# Patient Record
Sex: MALE | Race: WHITE | Employment: OTHER | URBAN - METROPOLITAN AREA
[De-identification: names, ages, dates, MRNs, and addresses within clinical notes are randomized per-mention and may not be internally consistent; named-entity substitution may affect disease eponyms.]

---

## 2017-01-26 ENCOUNTER — OFFICE VISIT (OUTPATIENT)
Dept: INTERNAL MEDICINE CLINIC | Age: 72
End: 2017-01-26

## 2017-01-26 VITALS
WEIGHT: 224 LBS | SYSTOLIC BLOOD PRESSURE: 104 MMHG | HEART RATE: 67 BPM | OXYGEN SATURATION: 97 % | DIASTOLIC BLOOD PRESSURE: 65 MMHG | TEMPERATURE: 97.4 F | BODY MASS INDEX: 27.85 KG/M2 | HEIGHT: 75 IN | RESPIRATION RATE: 18 BRPM

## 2017-01-26 DIAGNOSIS — J06.9 VIRAL UPPER RESPIRATORY TRACT INFECTION: Primary | ICD-10-CM

## 2017-01-26 DIAGNOSIS — R05.9 COUGH: ICD-10-CM

## 2017-01-26 RX ORDER — BENZONATATE 200 MG/1
200 CAPSULE ORAL
Qty: 25 CAP | Refills: 1 | Status: SHIPPED | OUTPATIENT
Start: 2017-01-26 | End: 2017-02-02

## 2017-01-26 NOTE — PATIENT INSTRUCTIONS
Saline Nasal Washes: Care Instructions  Your Care Instructions  Saline nasal washes help keep the nasal passages open by washing out thick or dried mucus. This simple remedy can help relieve symptoms of allergies, sinusitis, and colds. It also can make the nose feel more comfortable by keeping the mucous membranes moist. You may notice a little burning sensation in your nose the first few times you use the solution, but this usually gets better in a few days. Follow-up care is a key part of your treatment and safety. Be sure to make and go to all appointments, and call your doctor if you are having problems. It's also a good idea to know your test results and keep a list of the medicines you take. How can you care for yourself at home? · You can buy premixed saline solution in a squeeze bottle or other sinus rinse products at a drugstore. Read and follow the instructions on the label. · You also can make your own saline solution by adding 1 teaspoon of salt and 1 teaspoon of baking soda to 2 cups of distilled water. · If you use a homemade solution, pour a small amount into a clean bowl. Using a rubber bulb syringe, squeeze the syringe and place the tip in the salt water. Pull a small amount of the salt water into the syringe by relaxing your hand. · Sit down with your head tilted slightly back. Do not lie down. Put the tip of the bulb syringe or the squeeze bottle a little way into one of your nostrils. Gently drip or squirt a few drops into the nostril. Repeat with the other nostril. Some sneezing and gagging are normal at first.  · Gently blow your nose. · Wipe the syringe or bottle tip clean after each use. · Repeat this 2 or 3 times a day. · Use nasal washes gently if you have nosebleeds often. When should you call for help? Watch closely for changes in your health, and be sure to contact your doctor if:  · You often get nosebleeds. · You have problems doing the nasal washes.   Where can you learn more? Go to http://roxana-vinny.info/. Enter 071 [id number] [phone] in the search box to learn more about \"Saline Nasal Washes: Care Instructions. \"  Current as of: July 29, 2016  Content Version: 11.1  © 6285-5388 [employer]. Care instructions adapted under license by [employer] (which disclaims liability or warranty for this information). If you have questions about a medical condition or this instruction, always ask your healthcare professional. [address] any warranty or liability for your use of this information.

## 2017-01-26 NOTE — PROGRESS NOTES
Have you been to the ER or urgent care clinic since your last visit? ER  Sean Wyman   Fell x 1 month  Left wrist   better know     Have you been hospitalized since your last visit? No     Have you been seen or consulted any other health care provider outside of [address] [place] [address] since your last visit (including pap smears, colonoscopy screening)?   No

## 2017-01-26 NOTE — MR AVS SNAPSHOT
Visit Information Date & Time Provider Department Dept. Phone Encounter #  
 1/26/2017  8:30 AM Rachael Chery MD [facility] [id number] Follow-up Instructions Return if symptoms worsen or fail to improve. Your Appointments 1/30/2017 11:15 AM  
PACEMAKER with PACEMAKERCHAYO  
[facility] (FREDDIE SCHEDULING) Appt Note: stj/pm/stf/b 12-28-16  
 354 [facility] [place] [address] [place], [place] [phone]  
  
   
 [address] [address]  
  
    
 4/18/2017  3:00 PM  
VASCULAR TEST with VASCULAR, MADELINEIS  
[facility] ([facility] Appt Note: [address] CAROTIDS AT [address] Nestor Bustos; Mare Cavanaugh at 4pm vascular at [facility] [address] [place] [address]  
[phone]  
  
   
 [address] [address]  
  
    
 4/18/2017  4:00 PM  
ESTABLISHED PATIENT with Ayush Duke MD  
[facility] ([facility]) Appt Note: 6 MO [address] Dr. Rebekah Cavanaugh at 4pm vascular at [facility] [address] [facility] [address]  
[phone]  
  
   
 [address] [address]  
  
    
 5/22/2017  1:00 PM  
ROUTINE CARE with Rachael Chery MD  
[facility] Appt Note: 6 mnth fu  
 [address] [place] [address]  
[phone]  
  
   
 [address] [facility] [address] Upcoming Health Maintenance Date Due  
 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 Amoxicillin  10/28/2011    Rash Clindamycin  02/11/2009    Hives, Rash Pcn [Penicillins]  02/11/2009    Rash States he is able to tolerate cephalexin with no adverse reaction Sulfa (Sulfonamide Antibiotics)  02/21/2011    Rash Venom-honey Bee  08/18/2015    Rash [place] Current Immunizations  Reviewed on 5/5/2016 Name Date Influenza High Dose Vaccine PF 9/30/2016, 9/25/2015 Influenza Vaccine 9/22/2014 Influenza Vaccine (Whole Virus) 10/15/2012 Influenza Vaccine Split 10/24/2011 Pneumococcal Conjugate (PCV-13) 9/25/2015 Pneumococcal Vaccine (Unspecified Type) 10/15/2012, 10/24/2011 Tdap 1/11/2016 Zoster Vaccine, Live 7/28/2012 Not reviewed this visit You Were Diagnosed With   
  
 Codes Comments Viral upper respiratory tract infection    -  Primary ICD-10-CM: J06.9, B97.89 ICD-9-CM: 465.9 Cough     ICD-10-CM: R05 ICD-9-CM: 800. 2 Vitals BP Pulse Temp Resp Height(growth percentile) Weight(growth percentile) 104/65 (BP 1 Location: Left arm, BP Patient Position: Sitting) 67 97.4 °F (36.3 °C) 18 6' 3\" (1.905 m) 224 lb (101.6 kg) SpO2 BMI Smoking Status 97% 28 kg/m2 Never Smoker BMI and BSA Data Body Mass Index Body Surface Area  
 28 kg/m 2 2.32 m 2 Preferred Pharmacy [facility] [place] [facility] [phone] Your Updated Medication List  
  
   
This list is accurate as of: 1/26/17  8:52 AM.  Always use your most recent med list.  
  
  
  
  
 aspirin delayed-release 81 mg tablet Take 81 mg by mouth daily. atorvastatin 40 mg tablet Commonly known as:  LIPITOR  
TAKE 1 TABLET DAILY  
  
 benzonatate 200 mg capsule Commonly known as:  TESSALON  
 Take 1 Cap by mouth three (3) times daily as needed for Cough for up to 7 days. CLARITIN 10 mg tablet Generic drug:  loratadine  
take 10 mg by mouth daily. dabigatran etexilate 150 mg capsule Commonly known as:  PRADAXA TAKE 1 CAPSULE EVERY 12 HOURS  
  
 diclofenac sodium 20 mg/gram /actuation(2 %) Sopm  
Commonly known as:  PENNSAID  
1 Applicatorful by Apply Externally route four (4) times daily. Indications: OSTEOARTHROSIS OF THE KNEE  
  
 DYMISTA 137-50 mcg/spray [place] Generic drug:  azelastine-fluticasone  
by Nasal route two (2) times a day. EPINEPHrine 0.3 mg/0.3 mL injection Commonly known as:  EPIPEN  
0.3 mL by IntraMUSCular route once as needed for up to 1 dose. FISH OIL PO Take 1 Tab by mouth daily. 1,500 IU each capsule GLUCOSAMINE-CHONDROITIN PO Take  by mouth. 2 tabs daily  
  
 ketoconazole 2 % topical cream  
Commonly known as:  NIZORAL  
  
 melatonin 5 mg Cap capsule Take 1 Cap by mouth nightly. PROBIOTIC 4X 10-15 mg Tbec Generic drug:  B.infantis-B.ani-B.long-B.bifi Take  by mouth. RAPAFLO 8 mg capsule Generic drug:  silodosin Take 8 mg by mouth daily (with breakfast). TOPROL XL 50 mg XL tablet Generic drug:  metoprolol succinate TAKE ONE AND ONE-HALF TABLETS DAILY  
  
 TYLENOL EXTRA STRENGTH 500 mg tablet Generic drug:  acetaminophen Take 2 tablets by mouth three (3) times daily as needed for Pain. VITAMIN D3 1,000 unit tablet Generic drug:  cholecalciferol Take  by mouth daily. Prescriptions Sent to Pharmacy Refills  
 benzonatate (TESSALON) 200 mg capsule 1 Sig: Take 1 Cap by mouth three (3) times daily as needed for Cough for up to 7 days. Class: Normal  
 Pharmacy: [address] #: [phone] Route: Oral  
  
Follow-up Instructions Return if symptoms worsen or fail to improve. Patient Instructions Saline Nasal Washes: Care Instructions Your Care Instructions Saline nasal washes help keep the nasal passages open by washing out thick or dried mucus. This simple remedy can help relieve symptoms of allergies, sinusitis, and colds. It also can make the nose feel more comfortable by keeping the mucous membranes moist. You may notice a little burning sensation in your nose the first few times you use the solution, but this usually gets better in a few days. Follow-up care is a key part of your treatment and safety. Be sure to make and go to all appointments, and call your doctor if you are having problems. It's also a good idea to know your test results and keep a list of the medicines you take. How can you care for yourself at home? · You can buy premixed saline solution in a squeeze bottle or other sinus rinse products at a drugstore. Read and follow the instructions on the label. · You also can make your own saline solution by adding 1 teaspoon of salt and 1 teaspoon of baking soda to 2 cups of distilled water. · If you use a homemade solution, pour a small amount into a clean bowl. Using a rubber bulb syringe, squeeze the syringe and place the tip in the salt water. Pull a small amount of the salt water into the syringe by relaxing your hand. · Sit down with your head tilted slightly back. Do not lie down. Put the tip of the bulb syringe or the squeeze bottle a little way into one of your nostrils. Gently drip or squirt a few drops into the nostril. Repeat with the other nostril. Some sneezing and gagging are normal at first. 
· Gently blow your nose. · Wipe the syringe or bottle tip clean after each use. · Repeat this 2 or 3 times a day. · Use nasal washes gently if you have nosebleeds often. When should you call for help? Watch closely for changes in your health, and be sure to contact your doctor if: 
· You often get nosebleeds. · You have problems doing the nasal washes. Where can you learn more? Go to http://roxana-vinny.info/. Enter [phone] in the search box to learn more about \"Saline Nasal Washes: Care Instructions. \" Current as of: July 29, 2016 Content Version: 11.1 © 0649-6585 [employer]. Care instructions adapted under license by [employer] (which disclaims liability or warranty for this information). If you have questions about a medical condition or this instruction, always ask your healthcare professional. [address] any warranty or liability for your use of this information. Introducing \[facility]\"" & HEALTH SERVICES! Dear Sylvia Barker: 
Thank you for requesting a Hojo.pl account. Our records indicate that you already have an active [email or web address] account. You can access your account anytime at [email or web address]. [email or web address] Did you know that you can access your hospital and ER discharge instructions at any time in [email or web address]? You can also review all of your test results from your hospital stay or ER visit. Additional Information If you have questions, please visit the Frequently Asked Questions section of the [email or web address] website at [email or web address] Remember, [email or web address] is NOT to be used for urgent needs. For medical emergencies, dial 911. Now available from your iPhone and Android! Please provide this summary of care documentation to your next provider. Your primary care clinician is listed as Marquis Farnsworth. If you have any questions after today's visit, please call [phone].

## 2017-01-26 NOTE — PROGRESS NOTES
HISTORY OF PRESENT ILLNESS  Guerita Sutton is a 66 y.o. male. HPI  Problem visit:  Guerita Sutton is here for complaint of recurrent mildly productive cough. Problem began 1 month(s) ago with typical URI, resolved then cough returned 1 week ago. Severity is mild  Character of problem: clear mucus. Moving around house makes the problem worse. rest makes the problem better. Associated symptoms include: The patient denies fevers, chills or sweats. L clear nasal drainage. Treatments tried include: mucinex DM  used and beneficial  On dymista for allergic rhinitis per Dr. Charlotte LOUIS    Physical Exam   Constitutional: He appears well-developed and well-nourished. No distress. /65 (BP 1 Location: Left arm, BP Patient Position: Sitting)  Pulse 67  Temp 97.4 °F (36.3 °C)  Resp 18  Ht 6' 3\" (1.905 m)  Wt 224 lb (101.6 kg)  SpO2 97%  BMI 28 kg/m2   HENT:   Head: Normocephalic and atraumatic. Right Ear: Tympanic membrane and ear canal normal. No decreased hearing is noted. Left Ear: Tympanic membrane and ear canal normal. No decreased hearing is noted. Nose: Mucosal edema and rhinorrhea present. No epistaxis. Right sinus exhibits no maxillary sinus tenderness and no frontal sinus tenderness. Left sinus exhibits no maxillary sinus tenderness and no frontal sinus tenderness. Mouth/Throat: Uvula is midline and mucous membranes are normal. No oral lesions. Normal dentition. Posterior oropharyngeal erythema present. No oropharyngeal exudate, posterior oropharyngeal edema or tonsillar abscesses. Eyes: Conjunctivae are normal. Pupils are equal, round, and reactive to light. Right eye exhibits no discharge. Left eye exhibits no discharge. No scleral icterus. Neck: Neck supple. Cardiovascular: Normal rate, regular rhythm and normal heart sounds. Pulmonary/Chest: Effort normal and breath sounds normal.   Lymphadenopathy:     He has no cervical adenopathy. Neurological: He is alert.    Skin: Skin is warm and dry. He is not diaphoretic. Nursing note and vitals reviewed. ASSESSMENT and PLAN  Lanie Nicole was seen today for pressure behind the eyes and labs. Diagnoses and all orders for this visit:    Viral upper respiratory tract infection  -     benzonatate (TESSALON) 200 mg capsule; Take 1 Cap by mouth three (3) times daily as needed for Cough for up to 7 days. Radha Duvall was diagnosed with a viral upper respiratory infection. he is advised to drink plenty of water, use shower steam or humidifier to loosen secretions, saline nasal lavage 3 times daily and get plenty of rest.  he may use mucinex 1200mg twice daily along with tylenol or advil as needed for fever and pain. Written instructions were given to the patient emphasizing these recommendations. The patient was advised to rinse nasal passages with saline solution. Instructions were given for this. Cough  -     benzonatate (TESSALON) 200 mg capsule; Take 1 Cap by mouth three (3) times daily as needed for Cough for up to 7 days. Follow-up Disposition:  Return if symptoms worsen or fail to improve.

## 2017-01-30 ENCOUNTER — CLINICAL SUPPORT (OUTPATIENT)
Dept: CARDIOLOGY CLINIC | Age: 72
End: 2017-01-30

## 2017-01-30 DIAGNOSIS — Z95.0 CARDIAC PACEMAKER IN SITU: Primary | ICD-10-CM

## 2017-03-01 ENCOUNTER — CLINICAL SUPPORT (OUTPATIENT)
Dept: CARDIOLOGY CLINIC | Age: 72
End: 2017-03-01

## 2017-03-01 DIAGNOSIS — Z95.0 CARDIAC PACEMAKER IN SITU: Primary | ICD-10-CM

## 2017-03-03 ENCOUNTER — TELEPHONE (OUTPATIENT)
Dept: CARDIOLOGY CLINIC | Age: 72
End: 2017-03-03

## 2017-03-03 NOTE — TELEPHONE ENCOUNTER
Called patient to schedule pacemaker generator change. Spoke with spouse, not currently available. She took message and will have him call back when he gets home.

## 2017-03-06 DIAGNOSIS — E78.41 ELEVATED LP(A): ICD-10-CM

## 2017-03-06 DIAGNOSIS — I49.5 BRADY-TACHY SYNDROME (HCC): ICD-10-CM

## 2017-03-06 DIAGNOSIS — Z01.818 PREOP TESTING: ICD-10-CM

## 2017-03-06 DIAGNOSIS — E78.5 DYSLIPIDEMIA: Primary | ICD-10-CM

## 2017-03-21 ENCOUNTER — DOCUMENTATION ONLY (OUTPATIENT)
Dept: CARDIOLOGY CLINIC | Age: 72
End: 2017-03-21

## 2017-03-21 PROBLEM — Z45.010 PACEMAKER GENERATOR END OF LIFE: Status: ACTIVE | Noted: 2017-03-21

## 2017-03-22 ENCOUNTER — TELEPHONE (OUTPATIENT)
Dept: CARDIOLOGY CLINIC | Age: 72
End: 2017-03-22

## 2017-03-22 LAB
ALBUMIN SERPL-MCNC: 4.2 G/DL (ref 3.5–4.8)
ALBUMIN/GLOB SERPL: 2.1 {RATIO} (ref 1.2–2.2)
ALP SERPL-CCNC: 105 IU/L (ref 39–117)
ALT SERPL-CCNC: 18 IU/L (ref 0–44)
AST SERPL-CCNC: 27 IU/L (ref 0–40)
BILIRUB SERPL-MCNC: 1 MG/DL (ref 0–1.2)
BUN SERPL-MCNC: 17 MG/DL (ref 8–27)
BUN/CREAT SERPL: 15 (ref 10–22)
CALCIUM SERPL-MCNC: 9 MG/DL (ref 8.6–10.2)
CHLORIDE SERPL-SCNC: 104 MMOL/L (ref 96–106)
CHOLEST SERPL-MCNC: 110 MG/DL (ref 100–199)
CO2 SERPL-SCNC: 23 MMOL/L (ref 18–29)
CREAT SERPL-MCNC: 1.12 MG/DL (ref 0.76–1.27)
ERYTHROCYTE [DISTWIDTH] IN BLOOD BY AUTOMATED COUNT: 13.7 % (ref 12.3–15.4)
GLOBULIN SER CALC-MCNC: 2 G/DL (ref 1.5–4.5)
GLUCOSE SERPL-MCNC: 93 MG/DL (ref 65–99)
HCT VFR BLD AUTO: 48.5 % (ref 37.5–51)
HDL SERPL-SCNC: 29.3 UMOL/L
HDLC SERPL-MCNC: 46 MG/DL
HGB BLD-MCNC: 16.4 G/DL (ref 12.6–17.7)
INR PPP: 1.3 (ref 0.8–1.2)
INTERPRETATION, 910389: NORMAL
LDL SERPL QN: 20.3 NM
LDL SERPL-SCNC: 608 NMOL/L
LDL SMALL SERPL-SCNC: 282 NMOL/L
LDLC SERPL CALC-MCNC: 52 MG/DL (ref 0–99)
LP-IR SCORE SERPL: <25
MCH RBC QN AUTO: 30.2 PG (ref 26.6–33)
MCHC RBC AUTO-ENTMCNC: 33.8 G/DL (ref 31.5–35.7)
MCV RBC AUTO: 89 FL (ref 79–97)
PLATELET # BLD AUTO: 138 X10E3/UL (ref 150–379)
POTASSIUM SERPL-SCNC: 4.8 MMOL/L (ref 3.5–5.2)
PROT SERPL-MCNC: 6.2 G/DL (ref 6–8.5)
PROTHROMBIN TIME: 13.7 SEC (ref 9.1–12)
RBC # BLD AUTO: 5.43 X10E6/UL (ref 4.14–5.8)
SODIUM SERPL-SCNC: 143 MMOL/L (ref 134–144)
TRIGL SERPL-MCNC: 59 MG/DL (ref 0–149)
WBC # BLD AUTO: 5.6 X10E3/UL (ref 3.4–10.8)

## 2017-03-22 NOTE — TELEPHONE ENCOUNTER
Patient calling to confirm the time that he should be at the hospital on 03/28 for his procedure. States that he received the paperwork that says arrive at 10am but Sylvester says something different. Requests a call back at [phone]. Thanks!

## 2017-03-27 RX ORDER — SODIUM CHLORIDE 0.9 % (FLUSH) 0.9 %
5-10 SYRINGE (ML) INJECTION AS NEEDED
Status: CANCELLED | OUTPATIENT
Start: 2017-03-28

## 2017-03-27 RX ORDER — SODIUM CHLORIDE 0.9 % (FLUSH) 0.9 %
5-10 SYRINGE (ML) INJECTION EVERY 8 HOURS
Status: CANCELLED | OUTPATIENT
Start: 2017-03-28

## 2017-03-27 NOTE — PROGRESS NOTES
10:40 AM Pt's. Chart reviewed possibly including viewing of labs, test results, imaging results and history and physical for possible cath/angio/EP procedure.

## 2017-03-28 ENCOUNTER — HOSPITAL ENCOUNTER (OUTPATIENT)
Dept: NON INVASIVE DIAGNOSTICS | Age: 72
Discharge: HOME | End: 2017-03-28
Attending: INTERNAL MEDICINE | Admitting: INTERNAL MEDICINE
Payer: MEDICARE

## 2017-03-28 VITALS
SYSTOLIC BLOOD PRESSURE: 106 MMHG | WEIGHT: 220.46 LBS | DIASTOLIC BLOOD PRESSURE: 63 MMHG | HEIGHT: 74 IN | RESPIRATION RATE: 14 BRPM | BODY MASS INDEX: 28.29 KG/M2 | OXYGEN SATURATION: 97 % | HEART RATE: 63 BPM | TEMPERATURE: 97.7 F

## 2017-03-28 PROCEDURE — 33228 REMV&REPLC PM GEN DUAL LEAD: CPT

## 2017-03-28 PROCEDURE — 77030028698 HC BLD TISS PLSM MEDT -D

## 2017-03-28 PROCEDURE — 77030002933 HC SUT MCRYL J&J -A

## 2017-03-28 PROCEDURE — 77030018729 HC ELECTRD DEFIB PAD CARD -B

## 2017-03-28 PROCEDURE — 74011000250 HC RX REV CODE- 250: Performed by: INTERNAL MEDICINE

## 2017-03-28 PROCEDURE — 77030011640 HC PAD GRND REM COVD -A

## 2017-03-28 PROCEDURE — 77030036550 HC SLNG ARM PCH S2SG -A

## 2017-03-28 PROCEDURE — 99152 MOD SED SAME PHYS/QHP 5/>YRS: CPT | Performed by: INTERNAL MEDICINE

## 2017-03-28 PROCEDURE — 77030031139 HC SUT VCRL2 J&J -A

## 2017-03-28 PROCEDURE — 77030012935 HC DRSG AQUACEL BMS -B

## 2017-03-28 PROCEDURE — 77030029065 HC DRSG HEMO QCLOT ZMED -B

## 2017-03-28 PROCEDURE — C1786 PMKR, SINGLE, RATE-RESP: HCPCS | Performed by: INTERNAL MEDICINE

## 2017-03-28 PROCEDURE — 99153 MOD SED SAME PHYS/QHP EA: CPT | Performed by: INTERNAL MEDICINE

## 2017-03-28 PROCEDURE — 77030002996 HC SUT SLK J&J -A

## 2017-03-28 PROCEDURE — 74011250636 HC RX REV CODE- 250/636

## 2017-03-28 PROCEDURE — 74011250636 HC RX REV CODE- 250/636: Performed by: INTERNAL MEDICINE

## 2017-03-28 RX ORDER — HYDROCODONE BITARTRATE AND ACETAMINOPHEN 5; 325 MG/1; MG/1
1 TABLET ORAL
Status: DISCONTINUED | OUTPATIENT
Start: 2017-03-28 | End: 2017-03-28 | Stop reason: HOSPADM

## 2017-03-28 RX ORDER — SODIUM CHLORIDE 0.9 % (FLUSH) 0.9 %
5-10 SYRINGE (ML) INJECTION EVERY 8 HOURS
Status: DISCONTINUED | OUTPATIENT
Start: 2017-03-28 | End: 2017-03-28 | Stop reason: HOSPADM

## 2017-03-28 RX ORDER — ONDANSETRON 2 MG/ML
INJECTION INTRAMUSCULAR; INTRAVENOUS
Status: COMPLETED
Start: 2017-03-28 | End: 2017-03-28

## 2017-03-28 RX ORDER — BUPIVACAINE HYDROCHLORIDE 5 MG/ML
20 INJECTION, SOLUTION EPIDURAL; INTRACAUDAL ONCE
Status: COMPLETED | OUTPATIENT
Start: 2017-03-28 | End: 2017-03-28

## 2017-03-28 RX ORDER — VANCOMYCIN/0.9 % SOD CHLORIDE 1.5G/250ML
1500 PLASTIC BAG, INJECTION (ML) INTRAVENOUS ONCE
Status: COMPLETED | OUTPATIENT
Start: 2017-03-28 | End: 2017-03-28

## 2017-03-28 RX ORDER — MIDAZOLAM HYDROCHLORIDE 1 MG/ML
.5-1 INJECTION, SOLUTION INTRAMUSCULAR; INTRAVENOUS
Status: DISCONTINUED | OUTPATIENT
Start: 2017-03-28 | End: 2017-03-28 | Stop reason: HOSPADM

## 2017-03-28 RX ORDER — HEPARIN SODIUM 200 [USP'U]/100ML
500 INJECTION, SOLUTION INTRAVENOUS ONCE
Status: COMPLETED | OUTPATIENT
Start: 2017-03-28 | End: 2017-03-28

## 2017-03-28 RX ORDER — SODIUM CHLORIDE 0.9 % (FLUSH) 0.9 %
5-10 SYRINGE (ML) INJECTION AS NEEDED
Status: DISCONTINUED | OUTPATIENT
Start: 2017-03-28 | End: 2017-03-28 | Stop reason: HOSPADM

## 2017-03-28 RX ORDER — ACETAMINOPHEN 325 MG/1
650 TABLET ORAL
Status: DISCONTINUED | OUTPATIENT
Start: 2017-03-28 | End: 2017-03-28 | Stop reason: HOSPADM

## 2017-03-28 RX ORDER — LIDOCAINE HYDROCHLORIDE AND EPINEPHRINE 10; 10 MG/ML; UG/ML
20 INJECTION, SOLUTION INFILTRATION; PERINEURAL ONCE
Status: COMPLETED | OUTPATIENT
Start: 2017-03-28 | End: 2017-03-28

## 2017-03-28 RX ORDER — FENTANYL CITRATE 50 UG/ML
25-200 INJECTION, SOLUTION INTRAMUSCULAR; INTRAVENOUS
Status: DISCONTINUED | OUTPATIENT
Start: 2017-03-28 | End: 2017-03-28 | Stop reason: HOSPADM

## 2017-03-28 RX ORDER — GENTAMICIN SULFATE 80 MG/100ML
80 INJECTION, SOLUTION INTRAVENOUS ONCE
Status: COMPLETED | OUTPATIENT
Start: 2017-03-28 | End: 2017-03-28

## 2017-03-28 RX ORDER — ONDANSETRON 2 MG/ML
4 INJECTION INTRAMUSCULAR; INTRAVENOUS AS NEEDED
Status: DISCONTINUED | OUTPATIENT
Start: 2017-03-28 | End: 2017-03-28 | Stop reason: HOSPADM

## 2017-03-28 RX ORDER — DOXYCYCLINE 100 MG/1
100 CAPSULE ORAL 2 TIMES DAILY
Qty: 14 CAP | Refills: 0 | Status: SHIPPED | OUTPATIENT
Start: 2017-03-28 | End: 2017-04-04

## 2017-03-28 RX ADMIN — MIDAZOLAM HYDROCHLORIDE 1 MG: 1 INJECTION, SOLUTION INTRAMUSCULAR; INTRAVENOUS at 11:30

## 2017-03-28 RX ADMIN — FENTANYL CITRATE 25 MCG: 50 INJECTION, SOLUTION INTRAMUSCULAR; INTRAVENOUS at 11:40

## 2017-03-28 RX ADMIN — MIDAZOLAM HYDROCHLORIDE 1 MG: 1 INJECTION, SOLUTION INTRAMUSCULAR; INTRAVENOUS at 11:40

## 2017-03-28 RX ADMIN — LIDOCAINE HYDROCHLORIDE,EPINEPHRINE BITARTRATE 200 MG: 10; .01 INJECTION, SOLUTION INFILTRATION; PERINEURAL at 11:32

## 2017-03-28 RX ADMIN — ONDANSETRON 4 MG: 2 INJECTION INTRAMUSCULAR; INTRAVENOUS at 11:25

## 2017-03-28 RX ADMIN — FENTANYL CITRATE 50 MCG: 50 INJECTION, SOLUTION INTRAMUSCULAR; INTRAVENOUS at 11:26

## 2017-03-28 RX ADMIN — MIDAZOLAM HYDROCHLORIDE 2 MG: 1 INJECTION, SOLUTION INTRAMUSCULAR; INTRAVENOUS at 11:26

## 2017-03-28 RX ADMIN — VANCOMYCIN HYDROCHLORIDE 1 G: 1 INJECTION, POWDER, LYOPHILIZED, FOR SOLUTION INTRAVENOUS at 11:35

## 2017-03-28 RX ADMIN — GENTAMICIN SULFATE 80 MG: 80 INJECTION, SOLUTION INTRAVENOUS at 13:45

## 2017-03-28 RX ADMIN — VANCOMYCIN HYDROCHLORIDE 1500 MG: 10 INJECTION, POWDER, LYOPHILIZED, FOR SOLUTION INTRAVENOUS at 11:11

## 2017-03-28 RX ADMIN — BUPIVACAINE HYDROCHLORIDE 100 MG: 5 INJECTION, SOLUTION EPIDURAL; INTRACAUDAL at 11:32

## 2017-03-28 RX ADMIN — HEPARIN SODIUM 1000 UNITS: 200 INJECTION, SOLUTION INTRAVENOUS at 11:27

## 2017-03-28 NOTE — PROCEDURES
Cardiac Electrophysiology Report      PATIENT INFORMATION      Patient Name: Fidel Christian  MRN: [id number]             Study Date: 3/28/2017    YOB: 1945   Age: 66 y.o. Gender: male      Procedure:  Pacemaker Generator Change    Referring Physician:  Tiago Aquino MD and Dr. Sonja Anderson     Duty Name   Electrophysiologist Kwame Back MD   Monitor Daija Meyers RN   Circulator Cleopatra Arteaga RN; Milana Mills RN       PATIENT HISTORY     Fidel Christian is a 66 y.o. male with dual-chamber pacemaker, tachycardia/bradycardia syndrome, GERD, peripheral vascular disease, dyslipidemia, CVA, atrial fibrillation and BPH whose pacemaker was noted to be at [facility] and now presents for pacemaker generator change. PROCEDURE     The patient was brought to the Cardiac Electrophysiology laboratory in a post-absorptive, fasting state. Informed consent was obtained. A peripheral IV was in place. Continuous electrocardiographic, blood pressure, O2 saturation and  CO2 monitoring was initiated. Pre-operative antibiotics were administered pre-operatively. Self-adhesive cardioversion patches were positioned on the chest.  Conscious sedation was effectuated according to protocol. The patient was then prepped and draped in the usual sterile fashion. A 50/50 mixture of lidocaine (1%) with epinephrine and bupivicaine (0.5%) was utilized for local anesthesia. An incision was performed over the chronic pulse generator. Sharp and blunt dissection was carried down to the level of the generator. Hemostasis was maintained with electrocautery. The generator and leads were carefully freed from the adhesive scar capsule. The leads appeared to be intact upon visual inspection. The pacemaker generator was disconnected from the leads. The atrial lead was capped and placed in the base of the pocket. A new generator was then connected to the chronic lead.  The pocket was irrigated copiously with antibiotic solution. The generator was then placed into the pocket. The pocket was then closed in three layers using 2-O vicryl, 3-O vicryl, 4-0 Monocryl absorbable suture material and Dermabond. The skin was closed using a sub-cuticular technique. A bio-occlusive dressing were applied to the skin. The patient remained hemodynamically stable, tolerated the procedure well and was transferred in stable condition. There were no immediate complications encountered during the procedure. LEAD & GENERATOR DATA       Model # Serial #   Explanted Generator [facility]  [id number]   [id number]   Implanted Generator [facility] [id number]   [id number]    Chronic Atrial Lead [facility]  1488   [id number]   Chronic Ventricular Lead [facility]  1488   DC [id number]        PACE/SENSE DATA      Sensed Wave (mV) Threshold (V) Impedance (Ohms)   Atrium (capped)      Ventricle  4.8   0.75   410        FINAL PROGRAMMING     Mode Lower Rate (ppm) Upper Rate (ppm)   DDD 60 120       MEDICATION SUMMARY     Medication Route Unit Total   Gentamycin IV mg 80   Vancomycin IV Grams 1.5   Fentanyl IV micrograms  75    Versed IV grams  4       CONCLUSIONS     1. Successful pacemaker generator change. 2.  Doxycycline 100 mg twice daily ×7 days. 3. Wound check in EP clinic in 7 days. 4. Follow up in EP clinic in 1 month or earlier if necessary. 5. Follow up with  Jose Alfredo Stevens MD and Dr. Ebony Cadet as scheduled. Thank you, Jose Alfredo Stevens MD and Dr. Ebony Cadet for involving me in the care of this extraordinarily pleasant male.         Darius Carmichael MD  Cardiac Electrophysiology / Cardiology    [place] [address], [address] [facility], [address]  [place], [facility]   [address] [facility]  [phone] / [phone] Fax [phone] / [phone] Fax

## 2017-03-28 NOTE — IP AVS SNAPSHOT
Derek Floreso 
 
 
 [address] 1007 [facility] 
[phone] Patient: Yvon Farah MRN: [id number] OVU:[id number] You are allergic to the following Allergen Reactions Other Plant, Animal, Environmental Anaphylaxis Entire body edema with fire ants Amoxicillin Rash Clindamycin Hives Rash Pcn (Penicillins) Rash States he is able to tolerate cephalexin with no adverse reaction Sulfa (Sulfonamide Antibiotics) Rash Venom-Honey Bee Rash Lynn Hannacroix Recent Documentation Height Weight BMI Smoking Status 1.88 m 100 kg 28.31 kg/m2 Never Smoker Emergency Contacts Name Discharge Info Relation Home Work Mobile Nilda Tony DISCHARGE CAREGIVER [3] Spouse [3] [phone] About your hospitalization You were admitted on:  March 28, 2017 You last received care in the:  [facility] PACU You were discharged on:  March 28, 2017 Unit phone number:  [phone] Why you were hospitalized Your primary diagnosis was:  Not on File Providers Seen During Your Hospitalizations Provider Role Specialty Primary office phone Doris Arroyo MD Attending Provider Cardiology [phone] Your Primary Care Physician (PCP) Primary Care Physician Office Phone Office Fax [facility] [phone] Follow-up Information Follow up With Details Comments Contact Info Doris Arroyo MD On 4/3/2017 9 am  [address] 1007 [facility] 
[phone] Junior Tong MD   [facility] [address] [facility] [address] [place] [address] 
[phone] Your Appointments Monday April 03, 2017  9:00 AM EDT HOSPITAL DISCHARGE with Doris Arroyo MD  
[facility] ([facility]) [address] [place] [address] 1007 [facility]  
[phone] Tuesday April 18, 2017  3:00 PM EDT  
VASCULAR TEST with VASCULAR, ROBERTANCIS  
[facility] ([place] SCHEDULING) 700 [facility] 600 1007 [facility]  
[phone] Tuesday April 18, 2017  4:00 PM EDT  
ESTABLISHED PATIENT with Shadia Guajardo MD  
[facility] ([facility]) 700 [facility] [address] [place] [address]  
[phone] Friday May 05, 2017 10:00 AM EDT  
ESTABLISHED PATIENT with Andrew Bhandari MD  
[facility] ([facility]) 700 [facility] [address] [place] [address]  
[phone] Friday May 05, 2017 10:00 AM EDT  
PACEMAKER with PACEMAKERCHAYO  
[facility] ([place] SCHEDULING) 700 [facility] [address] [place] [address]  
[phone] Current Discharge Medication List  
  
START taking these medications Dose & Instructions Dispensing Information Comments Morning Noon Evening Bedtime  
 doxycycline 100 mg capsule Commonly known as:  VIBRAMYCIN Your last dose was: Your next dose is:    
   
   
 Dose:  100 mg Take 1 Cap by mouth two (2) times a day for 7 days. Quantity:  14 Cap Refills:  0 CONTINUE these medications which have NOT CHANGED Dose & Instructions Dispensing Information Comments Morning Noon Evening Bedtime  
 aspirin delayed-release 81 mg tablet Your last dose was: Your next dose is:    
   
   
 Dose:  81 mg Take 81 mg by mouth daily. Refills:  0  
     
   
   
   
  
 atorvastatin 40 mg tablet Commonly known as:  LIPITOR Your last dose was: Your next dose is: TAKE 1 TABLET DAILY Quantity:  90 Tab Refills:  2 CLARITIN 10 mg tablet Generic drug:  loratadine Your last dose was: Your next dose is:    
   
   
 Dose:  10 mg  
take 10 mg by mouth daily. Refills:  0  
     
   
   
   
  
 dabigatran etexilate 150 mg capsule Commonly known as:  PRADAXA Your last dose was: Your next dose is: TAKE 1 CAPSULE EVERY 12 HOURS Quantity:  180 Cap Refills:  3  
     
   
   
   
  
 diclofenac sodium 20 mg/gram /actuation(2 %) Sopm  
Commonly known as:  PENNSAID Your last dose was: Your next dose is:    
   
   
 Dose:  1 Applicatorful 1 Applicatorful by Apply Externally route four (4) times daily. Indications: OSTEOARTHROSIS OF THE KNEE Quantity:  1 Bottle Refills:  4 FISH OIL PO Your last dose was: Your next dose is:    
   
   
 Dose:  1 Tab Take 1 Tab by mouth daily. 1,500 IU each capsule Refills:  0 GLUCOSAMINE-CHONDROITIN PO Your last dose was: Your next dose is: Take  by mouth. 2 tabs daily Refills:  0  
     
   
   
   
  
 ketoconazole 2 % topical cream  
Commonly known as:  NIZORAL Your last dose was: Your next dose is:    
   
   
  Refills:  0  
     
   
   
   
  
 melatonin 5 mg Cap capsule Your last dose was: Your next dose is:    
   
   
 Dose:  5 mg Take 1 Cap by mouth nightly. Refills:  0 PROBIOTIC 4X 10-15 mg Tbec Generic drug:  B.infantis-B.ani-B.long-B.bifi Your last dose was: Your next dose is: Take  by mouth. Refills:  0  
     
   
   
   
  
 RAPAFLO 8 mg capsule Generic drug:  silodosin Your last dose was: Your next dose is:    
   
   
 Dose:  8 mg Take 8 mg by mouth daily (with breakfast). Refills:  0  
     
   
   
   
  
 TOPROL XL 50 mg XL tablet Generic drug:  metoprolol succinate Your last dose was: Your next dose is: TAKE ONE AND ONE-HALF TABLETS DAILY Quantity:  135 Tab Refills:  3  
     
   
   
   
  
 TYLENOL EXTRA STRENGTH 500 mg tablet Generic drug:  acetaminophen Your last dose was: Your next dose is:    
   
   
 Dose:  1000 mg Take 2 tablets by mouth three (3) times daily as needed for Pain. Refills:  0  
     
   
   
   
  
 VITAMIN D3 1,000 unit tablet Generic drug:  cholecalciferol Your last dose was: Your next dose is: Take  by mouth daily. Refills:  0 Where to Get Your Medications These medications were sent to [facility]  [address] [address] Phone:  [phone]  
  doxycycline 100 mg capsule Discharge Instructions Pacemaker Discharge Instructions Please make sure you have received your Temporary Pacemaker identification card with your discharge instructions MEDICATIONS ? Take only the medications prescribed to you at discharge. ACTIVITY ? Return to your normal activity, except as noted below. o Avoid tight clothes or unnecessary pressure over your incision (such as bra straps or seat belts). If it is tender or sensitive to clothing, cover the incision with a soft dressing or pad. 
o Questions about driving are individualized and should be discussed with one of the EP Physicians prior to discharge. SHOWERING  
  
 
? Leave the bandage over your incision for 7 days after the Pacemaker implant. You bandage will be removed in clinic in 7 days. ? It is important to keep the bandaged area clean and dry. You may shower around the site until the bandage is removed in clinic. Thereafter, you may shower after the bandage is removed, washing it gently with soap and water. Do not apply any lotions, powders, or perfumes to the incision line. ? Avoid submerging your incision in water (tub baths, hot tubs, or swimming) for four weeks. ? Underneath the dressing. o If you have white steri-strips over your incision (underneath the gauze dressing), they will curl up at the end and fall off, usually within 10 days. Do not pull them off. 
- OR -  
o You may have a different type of closure for the incision. If Dermabond Adhesive was used to close your incision, you will receive a separate instruction sheet. DISCHARGE PRECAUTIONS ? Record your temperature every day, at the same time, for 3 weeks after your implant. A temperature of 100.5 F, or higher, can be the first sign of infection. This should be reported to your Doctor immediately. ? You cannot have an MRI. You must be aware that any strong magnet or magnetic field can affect your Pacemaker. In general, be careful of metal detectors, heavy machinery, and any area where arc-welding is performed. Avoid metal detectors such as the ones in security checkpoints at [facility] or [address] [place] [address]. When approaching a security checkpoint show your Pacemaker ID Card to security personnel and ask to be hand searched. ? Always tell your doctor or dentist that you have a Pacemaker. Antibiotics may be prescribed before certain procedures. ? If you use a cell phone, hold it on the opposite side from where your Pacemaker is implanted. ? Your temporary identification will be given to you with these instructions. Keep your Pacemaker card in your wallet or on your person at all times. You should receive your permanent card in 8 weeks. If you do not receive your permanent card, please call the office at [phone]. TAKING YOUR PULSE ? Take your pulse the same time every day, preferably in the morning. ? Sit down and rest for 5 minutes prior to taking your pulse. ? Take your pulse for 1 full minute, use a clock or stop watch with a second hand. ? To feel your pulse, use the first two fingers of one hand; place them on the thumb side of the wrist of the opposite hand. The pulse will be steady, regular and throbbing. ? Call the EP Lab Doctors if your pulse is less than 40 beats per minute. SYMPTOMS THAT NEED TO BE REPORTED IMMEDIATELY ? Temperature more than 100.4 F ? Redness or warmth at the incision site, or pain for longer than the first 5 days after the implant. ? Drainage from the incision site. ? Swelling around the incision site. ? Shortness of breath. ? Rapid heart rate or palpitations. ? Dizziness, lightheadedness, fainting. ? Slow pulse below 40 beats per minute. ? REMEMBER: If you feel something is an emergency or cannot be handled over the phone, call 911 or go to the closest emergency room. Rajiv Grant MD 
Cardiac Electrophysiology / Cardiology [address], [address] Fernando Fermin.         [place] 
[phone] / [phone] Fax       [phone] / [phone] Fax Discharge Orders None ACO Transitions of Care [address] offers a voluntary care coordination program to provide high quality service and care to [facility] fee-for-service beneficiaries. Nenita Greene was designed to help you enhance your health and well-being through the following services: ? Transitions of Care  support for individuals who are transitioning from one care setting to another (example: Hospital to home). ? Chronic and Complex Care Coordination  support for individuals and caregivers of those with serious or chronic illnesses or with more than one chronic (ongoing) condition and those who take a number of different medications. If you meet specific medical criteria, a [facility] may call you directly to coordinate your care with your primary care physician and your other care providers. For questions about the [address]  programs, please, contact your physicians office. For general questions or additional information about Accountable Care Organizations: 
Please visit www.medicare.gov/acos. html or call 1-800-MEDICARE (1-465.577.6218) TTY users should call 1-788.995.6450. Introducing [facility] & HEALTH SERVICES! Dear Earlene Duong: 
Thank you for requesting a ImageProtect account. Our records indicate that you already have an active ImageProtect account. You can access your account anytime at [email or web address] Did you know that you can access your hospital and ER discharge instructions at any time in ImageProtect? You can also review all of your test results from your hospital stay or ER visit. Additional Information If you have questions, please visit the Frequently Asked Questions section of the ImageProtect website at [email or web address] Remember, ImageProtect is NOT to be used for urgent needs. For medical emergencies, dial 911. Now available from your iPhone and Android! General Information Please provide this summary of care documentation to your next provider. Patient Signature:  ____________________________________________________________ Date:  ____________________________________________________________  
  
Tamara Langley Provider Signature:  ____________________________________________________________ Date:  ____________________________________________________________

## 2017-03-28 NOTE — PROGRESS NOTES
TRANSFER - OUT REPORT:    Verbal report given to nadine rn(name) on Debe Printers  [facility]       Report consisted of patients Situation, Background, Assessment and   Recommendations(SBAR). Information from the following report(s) SBAR, Procedure Summary, MAR and Recent Results was reviewed with the receiving nurse. Lines:   Peripheral IV 12/08/11 Left Forearm (Active)       Peripheral IV 03/28/17 Right Antecubital (Active)   Site Assessment Clean, dry, & intact 3/28/2017 10:49 AM        Opportunity for questions and clarification was provided.       Patient transported with:   Registered Nurse

## 2017-03-28 NOTE — DISCHARGE INSTRUCTIONS
Pacemaker  Discharge Instructions    Please make sure you have received your Temporary Pacemaker identification card with your discharge instructions      MEDICATIONS         Take only the medications prescribed to you at discharge. ACTIVITY         Return to your normal activity, except as noted below. o Avoid tight clothes or unnecessary pressure over your incision (such as bra straps or seat belts). If it is tender or sensitive to clothing, cover the incision with a soft dressing or pad.  o Questions about driving are individualized and should be discussed with one of the EP Physicians prior to discharge. SHOWERING         Leave the bandage over your incision for 7 days after the Pacemaker implant. You bandage will be removed in clinic in 7 days.  It is important to keep the bandaged area clean and dry. You may shower around the site until the bandage is removed in clinic. Thereafter, you may shower after the bandage is removed, washing it gently with soap and water. Do not apply any lotions, powders, or perfumes to the incision line.  Avoid submerging your incision in water (tub baths, hot tubs, or swimming) for four weeks.  Underneath the dressing. o If you have white steri-strips over your incision (underneath the gauze dressing), they will curl up at the end and fall off, usually within 10 days. Do not pull them off.  - OR -   o You may have a different type of closure for the incision. If Dermabond Adhesive was used to close your incision, you will receive a separate instruction sheet. DISCHARGE PRECAUTIONS         Record your temperature every day, at the same time, for 3 weeks after your implant. A temperature of 100.5 F, or higher, can be the first sign of infection. This should be reported to your Doctor immediately.  You cannot have an MRI. You must be aware that any strong magnet or magnetic field can affect your Pacemaker.   In general, be careful of metal detectors, heavy machinery, and any area where arc-welding is performed. Avoid metal detectors such as the ones in security checkpoints at [facility] or [address] [place] [address]. When approaching a security checkpoint show your Pacemaker ID Card to security personnel and ask to be hand searched.  Always tell your doctor or dentist that you have a Pacemaker. Antibiotics may be prescribed before certain procedures.  If you use a cell phone, hold it on the opposite side from where your Pacemaker is implanted.  Your temporary identification will be given to you with these instructions. Keep your Pacemaker card in your wallet or on your person at all times. You should receive your permanent card in 8 weeks. If you do not receive your permanent card, please call the office at [phone]. TAKING YOUR PULSE         Take your pulse the same time every day, preferably in the morning.  Sit down and rest for 5 minutes prior to taking your pulse.  Take your pulse for 1 full minute, use a clock or stop watch with a second hand.  To feel your pulse, use the first two fingers of one hand; place them on the thumb side of the wrist of the opposite hand. The pulse will be steady, regular and throbbing.  Call the EP Lab Doctors if your pulse is less than 40 beats per minute. SYMPTOMS THAT NEED TO BE REPORTED IMMEDIATELY         Temperature more than 100.4 F     Redness or warmth at the incision site, or pain for longer than the first 5 days after the implant.  Drainage from the incision site.  Swelling around the incision site.  Shortness of breath.  Rapid heart rate or palpitations.  Dizziness, lightheadedness, fainting.  Slow pulse below 40 beats per minute.  REMEMBER: If you feel something is an emergency or cannot be handled over the phone, call 911 or go to the closest emergency room.           Jud Cuadra MD  Cardiac Electrophysiology / Cardiology    [address] [facility] [address]  [address] [facility], [address] [facility], [address], [facility] [place].         [facility], [facility]  [phone] / [phone] Fax       [phone] / [phone] Fax

## 2017-03-28 NOTE — IP AVS SNAPSHOT
Current Discharge Medication List  
  
START taking these medications Dose & Instructions Dispensing Information Comments Morning Noon Evening Bedtime  
 doxycycline 100 mg capsule Commonly known as:  VIBRAMYCIN Your last dose was: Your next dose is:    
   
   
 Dose:  100 mg Take 1 Cap by mouth two (2) times a day for 7 days. Quantity:  14 Cap Refills:  0 CONTINUE these medications which have NOT CHANGED Dose & Instructions Dispensing Information Comments Morning Noon Evening Bedtime  
 aspirin delayed-release 81 mg tablet Your last dose was: Your next dose is:    
   
   
 Dose:  81 mg Take 81 mg by mouth daily. Refills:  0  
     
   
   
   
  
 atorvastatin 40 mg tablet Commonly known as:  LIPITOR Your last dose was: Your next dose is: TAKE 1 TABLET DAILY Quantity:  90 Tab Refills:  2 CLARITIN 10 mg tablet Generic drug:  loratadine Your last dose was: Your next dose is:    
   
   
 Dose:  10 mg  
take 10 mg by mouth daily. Refills:  0  
     
   
   
   
  
 dabigatran etexilate 150 mg capsule Commonly known as:  PRADAXA Your last dose was: Your next dose is: TAKE 1 CAPSULE EVERY 12 HOURS Quantity:  180 Cap Refills:  3  
     
   
   
   
  
 diclofenac sodium 20 mg/gram /actuation(2 %) Sopm  
Commonly known as:  PENNSAID Your last dose was: Your next dose is:    
   
   
 Dose:  1 Applicatorful 1 Applicatorful by Apply Externally route four (4) times daily. Indications: OSTEOARTHROSIS OF THE KNEE Quantity:  1 Bottle Refills:  4 FISH OIL PO Your last dose was: Your next dose is:    
   
   
 Dose:  1 Tab Take 1 Tab by mouth daily. 1,500 IU each capsule Refills:  0  GLUCOSAMINE-CHONDROITIN PO  
   
 Your last dose was: Your next dose is: Take  by mouth. 2 tabs daily Refills:  0  
     
   
   
   
  
 ketoconazole 2 % topical cream  
Commonly known as:  NIZORAL Your last dose was: Your next dose is:    
   
   
  Refills:  0  
     
   
   
   
  
 melatonin 5 mg Cap capsule Your last dose was: Your next dose is:    
   
   
 Dose:  5 mg Take 1 Cap by mouth nightly. Refills:  0 PROBIOTIC 4X 10-15 mg Tbec Generic drug:  B.infantis-B.ani-B.long-B.bifi Your last dose was: Your next dose is: Take  by mouth. Refills:  0  
     
   
   
   
  
 RAPAFLO 8 mg capsule Generic drug:  silodosin Your last dose was: Your next dose is:    
   
   
 Dose:  8 mg Take 8 mg by mouth daily (with breakfast). Refills:  0  
     
   
   
   
  
 TOPROL XL 50 mg XL tablet Generic drug:  metoprolol succinate Your last dose was: Your next dose is: TAKE ONE AND ONE-HALF TABLETS DAILY Quantity:  135 Tab Refills:  3  
     
   
   
   
  
 TYLENOL EXTRA STRENGTH 500 mg tablet Generic drug:  acetaminophen Your last dose was: Your next dose is:    
   
   
 Dose:  1000 mg Take 2 tablets by mouth three (3) times daily as needed for Pain. Refills:  0  
     
   
   
   
  
 VITAMIN D3 1,000 unit tablet Generic drug:  cholecalciferol Your last dose was: Your next dose is: Take  by mouth daily. Refills:  0 Where to Get Your Medications These medications were sent to [facility]  [address] [address] Phone:  [phone]  
  doxycycline 100 mg capsule

## 2017-03-28 NOTE — PROGRESS NOTES
1030 Received patient from waiting area. Armband and allergies verbally confirmed with patient. Procedure explained and consents signed. 10:50 AM 10:50 AM Dr. Yenny Lucia in to see explained procedure   1100 pacemaker representative in to see  [address] [place], [place] [address]y REPORT:    Verbal report given to Live(name) on Lynda Jesus  being transferred to ep lab(unit) for routine progression of care       Report consisted of patients Situation, Background, Assessment and   Recommendations(SBAR). Information from the following report(s) Procedure Summary was reviewed with the receiving nurse. Lines:   Peripheral IV 12/08/11 Left Forearm (Active)       Peripheral IV 03/28/17 Right Antecubital (Active)   Site Assessment Clean, dry, & intact 3/28/2017 10:49 AM        Opportunity for questions and clarification was provided. Patient transported with:   Registered Nurse   [address] De[address] Herbert - [place] REPORT:    Verbal report received from 46 Kavya Breaux rn(name) on Lynda Jesus  being received from ep lab(unit) for routine progression of care      Report consisted of patients Situation, Background, Assessment and   Recommendations(SBAR). Information from the following report(s) Procedure Summary was reviewed with the receiving nurse. Opportunity for questions and clarification was provided. Assessment completed upon patients arrival to unit and care assumed. Dry drerssing on right side of chest ice pack to site, sling to be placed,     12:26 PM update given to wife       V pacing at 62 dozing arousable    12:53 PM pacemaker rep setting up home device  1315 reviewing discharge instructions with pt, needs meds sent to another pharmacy  [facility] pt has changed pharmacies  1430 discharged Copy of printed discharge instructions given to patient and  Wife Discharge instructions given to patient and wife. All questions answered.  Patient and wife verbalized understanding. Patient escorted via wheelchair to entrance. wife driving. Patient discharged into care of wife. Manpreet Grullon

## 2017-03-28 NOTE — H&P
HISTORY OF PRESENTING ILLNESS      Daniel Gomez is a 66 y.o. male with dual-chamber pacemaker, tachycardia/bradycardia syndrome, GERD, peripheral vascular disease, dyslipidemia, CVA, atrial fibrillation and BPH whose pacemaker was noted to be at [facility] and now presents for pacemaker generator change. ACTIVE PROBLEM LIST     Patient Active Problem List    Diagnosis Date Noted    Pacemaker generator end of life 03/21/2017    Pacemaker 12/28/2016    Gastroesophageal reflux disease without esophagitis 08/05/2016    Advanced care planning/counseling discussion 05/05/2016    Bilateral carotid artery disease ([address] 11/26/2014    Dyslipidemia 06/05/2014    Elevated Lp(a) 06/05/2014    Cerebral infarction ([address] 03/15/2014    James-tachy syndrome ([address] 03/27/2012    Chronic maxillary sinusitis 12/08/2011    Family history of prostate cancer 09/16/2011    Scoliosis 04/26/2011    Atrial fibrillation (HCC) 03/22/2010    BPH (benign prostatic hyperplasia) 01/27/2010    OA (osteoarthritis) of knee 02/11/2009    Skin moles 02/11/2009    Colon polyp 02/11/2009    AR (allergic rhinitis) 02/11/2009    Thyroid nodule 02/11/2009           PAST MEDICAL HISTORY     Past Medical History:   Diagnosis Date    AR (allergic rhinitis) 2/11/2009    Atrial fibrillation ([address]     onset 2003, now chronic, CHADS2 1-2. 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 Stubbs Grade  25-30 yrs ago    STRESS TEST CARDIOLITE  4/2008    11 min., normal perfusion, EF 57%    VAS CAROTID DUPLEX BILATERAL  8/15/11    10-49% bilateral, unchanged from one year prior          ALLERGIES     Allergies   Allergen Reactions    Other Plant, Animal, Environmental Anaphylaxis     Entire body edema with fire ants    Amoxicillin Rash    Clindamycin Hives and Rash    Pcn [Penicillins] Rash     States he is able to tolerate cephalexin with no adverse reaction    Sulfa (Sulfonamide Antibiotics) Rash    Venom-Honey Bee Rash     Yellowjackets            FAMILY HISTORY     Family History   Problem Relation Age of Onset    Stroke Mother     Cancer Father      prostate    Hypertension Father     Diabetes Brother     Cancer Brother      prostate    Diabetes Brother     Hypertension Brother     Cancer Brother      melanoma    Cancer Daughter      melanoma    negative for cardiac disease       SOCIAL HISTORY     Social History     Social History    Marital status:      Spouse name: N/A    Number of children: N/A    Years of education: N/A     Social History Main Topics    Smoking status: Never Smoker    Smokeless tobacco: Never Used    Alcohol use No      Comment: no     Drug use: No    Sexual activity: Not Currently     Other Topics Concern    Not on file     Social History Narrative         MEDICATIONS     Current Facility-Administered Medications Medication Dose Route Frequency    vancomycin (VANCOCIN) 1500 mg in  ml infusion  1,500 mg IntraVENous ONCE    vancomycin irrigation 1 g/500 mL 0.9% sodium chloride   Irrigation ONCE    gentamicin in Saline (Iso-osm) (GARAMYCIN) 80 mg/100 mL IVPB premix 80 mg  80 mg IntraVENous ONCE       I have reviewed the nurses notes, vitals, problem list, allergy list, medical history, family, social history and medications. REVIEW OF SYMPTOMS      General: Pt denies excessive weight gain or loss. Pt is able to conduct ADL's  HEENT: Denies blurred vision, headaches, hearing loss, epistaxis and difficulty swallowing. Respiratory: Denies cough, congestion, shortness of breath, CONTRERAS, wheezing or stridor. Cardiovascular: Denies precordial pain, palpitations, edema or PND  Gastrointestinal: Denies poor appetite, indigestion, abdominal pain or blood in stool  Genitourinary: Denies hematuria, dysuria, increased urinary frequency  Musculoskeletal: Denies joint pain or swelling from muscles or joints  Neurologic: Denies tremor, paresthesias, headache, or sensory motor disturbance  Psychiatric: Denies confusion, insomnia, depression  Integumentray: Denies rash, itching or ulcers. Hematologic: Denies easy bruising, bleeding     PHYSICAL EXAMINATION      Vitals:    03/28/17 1028   Weight: 220 lb 7.4 oz (100 kg)   Height: 6' 2\" (1.88 m)     General: Well developed, in no acute distress. HEENT: No jaundice, oral mucosa moist, no oral ulcers  Neck: Supple, no stiffness, no lymphadenopathy, supple  Heart:  Normal S1/S2 negative S3 or S4. Regular, no murmur, gallop or rub, no jugular venous distention  Respiratory: Clear bilaterally x 4, no wheezing or rales  Abdomen:   Soft, non-tender, bowel sounds are active.   Extremities:  No edema, normal cap refill, no cyanosis.   Musculoskeletal: No clubbing, no deformities  Neuro: A&Ox3, speech clear, gait stable, cooperative, no focal neurologic deficits  Skin: Skin color is normal. No rashes or lesions. Non diaphoretic, moist.  Vascular: 2+ pulses symmetric in all extremities       DIAGNOSTIC DATA      EKG:       LABORATORY DATA      Lab Results   Component Value Date/Time    WBC 5.6 03/20/2017 08:20 AM    HGB 16.4 03/20/2017 08:20 AM    HCT 48.5 03/20/2017 08:20 AM    PLATELET 843 91/61/6062 08:20 AM    MCV 89 03/20/2017 08:20 AM      Lab Results   Component Value Date/Time    Sodium 143 03/20/2017 08:20 AM    Potassium 4.8 03/20/2017 08:20 AM    Chloride 104 03/20/2017 08:20 AM    CO2 23 03/20/2017 08:20 AM    Anion gap 9 11/30/2015 10:00 AM    Glucose 93 03/20/2017 08:20 AM    BUN 17 03/20/2017 08:20 AM    Creatinine 1.12 03/20/2017 08:20 AM    BUN/Creatinine ratio 15 03/20/2017 08:20 AM    GFR est AA 75 03/20/2017 08:20 AM    GFR est non-AA 65 03/20/2017 08:20 AM    Calcium 9.0 03/20/2017 08:20 AM    Bilirubin, total 1.0 03/20/2017 08:20 AM    AST (SGOT) 27 03/20/2017 08:20 AM    Alk. phosphatase 105 03/20/2017 08:20 AM    Protein, total 6.2 03/20/2017 08:20 AM    Albumin 4.2 03/20/2017 08:20 AM    Globulin 2.9 05/04/2010 10:00 AM    A-G Ratio 2.1 03/20/2017 08:20 AM    ALT (SGPT) 18 03/20/2017 08:20 AM           ASSESSMENT      1. Pacemaker  2. Atrial fibrillation  3. Dyslipidemia  4. BPH  5. Peripheral vascular disease       PLAN     Plan for pacemaker generator change        Thank you,  Nuha Jon MD and Dr. Annia Lobo for involving me in the care of this extraordinarily pleasant male. Please do not hesitate to contact me for further questions/concerns.          Imelda Ramos MD  Cardiac Electrophysiology / Cardiology    [facility].  [address] [facility], [facility], [address] [place] [address] [address]isingtonSamanthaMercy Hospital Washington  [phone] / [phone] Fax   [phone] / [phone] Fax

## 2017-03-28 NOTE — PROGRESS NOTES
TRANSFER - IN REPORT:    Verbal report received from faiza(name) on Asia Jordan  being received from clpo(unit) for routine progression of care      Report consisted of patients Situation, Background, Assessment and   Recommendations(SBAR). Information from the following report(s) SBAR was reviewed with the receiving nurse. Opportunity for questions and clarification was provided. Assessment completed upon patients arrival to unit and care assumed.

## 2017-03-31 ENCOUNTER — PATIENT OUTREACH (OUTPATIENT)
Dept: INTERNAL MEDICINE CLINIC | Age: 72
End: 2017-03-31

## 2017-03-31 ENCOUNTER — PATIENT OUTREACH (OUTPATIENT)
Dept: CARDIOLOGY CLINIC | Age: 72
End: 2017-03-31

## 2017-03-31 NOTE — PROGRESS NOTES
[address] [place]:  Transitional Care Nurse Navigator Note:  Hospital Follow Up for hospital visit to : [address] [facility] Admission from 3/28/17 - 3/28/17 for pacemaker generator change. This represents Transitions of Care because NN spoke with patient and/or caregiver within 3 business days of discharge. Pt's TCM follow up appt is scheduled with Dr. Martha Munguia on Monday 4/3/17 @ [facility] to Mr. Ney Miranda today. He is doing well since discharge and his only complaint is being a little bit sore at his site. He lives at home with his wife and is independent in his ADL's. Reviewed discharge instructions with patient and all medications as well and they are up-to-date. Gave patient my contact information for any assistance. Medical History:     Past Medical History:   Diagnosis Date    AR (allergic rhinitis) 2/11/2009    Atrial fibrillation ([address]     onset 2003, now chronic, CHADS2 1-2. 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 Martha Munguia' notes 3/28/17:  HISTORY OF PRESENTING ILLNESS   Delma Cooper is a 66 y.o. male with dual-chamber pacemaker, tachycardia/bradycardia syndrome, GERD, peripheral vascular disease, dyslipidemia, CVA, atrial fibrillation and BPH whose pacemaker was noted to be at [facility] and now presents for pacemaker generator change. ASSESSMENT       1. Pacemaker  2. Atrial fibrillation  3. Dyslipidemia  4. BPH  5. Peripheral vascular disease  PLAN   Plan for pacemaker generator change     Admitted to Cardiology Service. Course of current Hospitalization (referenced by Dr. Arlyn Harris' note 3/28/17):   CONCLUSIONS   1. Successful pacemaker generator change. 2.  Doxycycline 100 mg twice daily ×7 days. 3. Wound check in EP clinic in 7 days. 4. Follow up in EP clinic in 1 month or earlier if necessary. 5. Follow up with Meera Long MD and Dr. Ines Fraser as scheduled.      Advance Medical Directive on file in EMR? no     Total Hospitalizations/ED visits last 12 months? [facility] orders at discharge? no     Called patient on 3/31/17 and verified with 2 identifiers. Medication Reconciliation completed: yes New medications at discharge include STARTED: doxycycline    Support System consists of: spouse    Plan: MD on call at [phone] 24/7.    Follow up appt with cardiology on 4/3/17   Reviewed discharge instructions with patient

## 2017-03-31 NOTE — PROGRESS NOTES
NNTOCIP call. Patient discharged on 3/28/17 from [facility] for a scheduled procedure. Cardio NN completed call, see note. Sammy Wynn

## 2017-04-03 ENCOUNTER — OFFICE VISIT (OUTPATIENT)
Dept: CARDIOLOGY CLINIC | Age: 72
End: 2017-04-03

## 2017-04-03 VITALS
WEIGHT: 228 LBS | SYSTOLIC BLOOD PRESSURE: 140 MMHG | HEART RATE: 60 BPM | RESPIRATION RATE: 18 BRPM | HEIGHT: 74 IN | BODY MASS INDEX: 29.26 KG/M2 | DIASTOLIC BLOOD PRESSURE: 88 MMHG | OXYGEN SATURATION: 97 %

## 2017-04-03 DIAGNOSIS — I48.20 CHRONIC ATRIAL FIBRILLATION (HCC): Primary | ICD-10-CM

## 2017-04-03 DIAGNOSIS — I49.5 BRADY-TACHY SYNDROME (HCC): ICD-10-CM

## 2017-04-03 NOTE — PROGRESS NOTES
Patient presents for wound check post-pacemaker generator change. The dressing was removed and the site was inspected. The site appeared to be well-healing without ecchymosis/tenderness/erythema. Denies pain, fevers, discharge. Plan:    Continue follow up in device clinic as planned.        Hayden Jurado MD

## 2017-04-03 NOTE — PROGRESS NOTES
Visit Vitals    /88 (BP 1 Location: Left arm, BP Patient Position: Sitting)    Pulse 60    Resp 18    Ht 6' 2\" (1.88 m)    Wt 228 lb (103.4 kg)    SpO2 97%    BMI 29.27 kg/m2

## 2017-04-17 ENCOUNTER — TELEPHONE (OUTPATIENT)
Dept: INTERNAL MEDICINE CLINIC | Age: 72
End: 2017-04-17

## 2017-04-17 NOTE — TELEPHONE ENCOUNTER
----- Message from Jerod Gustafson sent at 4/17/2017  8:59 AM EDT -----  Regarding: jennings/telephone  Patient is requesting that his current appointment be \"converted\" to a complete physical exam. Best contact number is [phone].       Pt was told that we would not covert the current appt and he would need to schedule a physical.

## 2017-04-18 ENCOUNTER — OFFICE VISIT (OUTPATIENT)
Dept: CARDIOLOGY CLINIC | Age: 72
End: 2017-04-18

## 2017-04-18 ENCOUNTER — CLINICAL SUPPORT (OUTPATIENT)
Dept: CARDIOLOGY CLINIC | Age: 72
End: 2017-04-18

## 2017-04-18 VITALS
SYSTOLIC BLOOD PRESSURE: 110 MMHG | DIASTOLIC BLOOD PRESSURE: 80 MMHG | HEART RATE: 76 BPM | BODY MASS INDEX: 28.49 KG/M2 | OXYGEN SATURATION: 99 % | WEIGHT: 222 LBS | HEIGHT: 74 IN

## 2017-04-18 DIAGNOSIS — Z95.0 PACEMAKER: ICD-10-CM

## 2017-04-18 DIAGNOSIS — I77.9 BILATERAL CAROTID ARTERY DISEASE (HCC): ICD-10-CM

## 2017-04-18 DIAGNOSIS — I49.5 BRADY-TACHY SYNDROME (HCC): ICD-10-CM

## 2017-04-18 DIAGNOSIS — E78.5 DYSLIPIDEMIA: ICD-10-CM

## 2017-04-18 DIAGNOSIS — I48.20 CHRONIC ATRIAL FIBRILLATION (HCC): Primary | ICD-10-CM

## 2017-04-18 DIAGNOSIS — E78.41 ELEVATED LP(A): ICD-10-CM

## 2017-04-18 DIAGNOSIS — I65.23 CAROTID STENOSIS, BILATERAL: Primary | ICD-10-CM

## 2017-04-18 PROBLEM — Z45.010 PACEMAKER GENERATOR END OF LIFE: Status: RESOLVED | Noted: 2017-03-21 | Resolved: 2017-04-18

## 2017-04-18 NOTE — MR AVS SNAPSHOT
Visit Information Date & Time Provider Department Dept. Phone Encounter #  
 4/18/2017  4:00 PM Yanira Stevenson MD [facility] Leah Brito [phone] [id number] Follow-up Instructions Return in about 6 months (around 10/18/2017). Your Appointments 5/5/2017 10:00 AM  
ESTABLISHED PATIENT with David Allen MD  
[facility] ([address]) Appt Note: 5 wk hosp fu and device check sll [address] [facility] [address] [place] [address]  
[address] [place] [address]  
  
    
 5/5/2017 10:00 AM  
PACEMAKER with PACEMAKER, STFRANCES  
[facility] (FREDDIE SCHEDULING) Appt Note: 5 wk hosp fu and device check sll [address] [facility] [address]  
[phone]  
  
   
 [address] [address]  
  
    
 5/22/2017  1:00 PM  
ROUTINE CARE with Edna Rojas MD  
[facility] [address] [place] [address] Appt Note: 6 mnth fu  
 [address] [place] [address]  
[phone]  
  
   
 [address] [facility] [address] Upcoming Health Maintenance Date Due  
 MEDICARE YEARLY EXAM 5/6/2017 GLAUCOMA SCREENING Q2Y 2/22/2018 COLONOSCOPY 11/17/2024 DTaP/Tdap/Td series (2 - Td) 1/11/2026 Allergies as of 4/18/2017  Review Complete On: 4/18/2017 By: Tanya Farris NP Severity Noted Reaction Type Reactions Other Plant, Animal, Environmental High 07/24/2015   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 Amoxicillin  10/28/2011    Rash Clindamycin  02/11/2009    Hives, Rash Pcn [Penicillins]  02/11/2009    Rash States he is able to tolerate cephalexin with no adverse reaction Sulfa (Sulfonamide Antibiotics)  02/21/2011    Rash Venom-honey Bee  08/18/2015    Solo Briones Current Immunizations  Reviewed on 5/5/2016 Name Date Influenza High Dose Vaccine PF 9/30/2016, 9/25/2015 Influenza Vaccine 9/22/2014 Influenza Vaccine (Whole Virus) 10/15/2012 Influenza Vaccine Split 10/24/2011 Pneumococcal Conjugate (PCV-13) 9/25/2015 Pneumococcal Vaccine (Unspecified Type) 10/15/2012, 10/24/2011 Tdap 1/11/2016 Zoster Vaccine, Live 7/28/2012 Not reviewed this visit You Were Diagnosed With   
  
 Codes Comments Chronic atrial fibrillation (HCC)    -  Primary ICD-10-CM: F96.5 ICD-9-CM: 427.31 James-tachy syndrome [facility]     ICD-10-CM: I49.5 ICD-9-CM: 427.81 Bilateral carotid artery disease ([facility]     ICD-10-CM: I77.9 ICD-9-CM: 711. 9 Dyslipidemia     ICD-10-CM: E78.5 ICD-9-CM: 272.4 Elevated Lp(a)     ICD-10-CM: E77.71 ICD-9-CM: 272.8 Pacemaker     ICD-10-CM: Z95.0 ICD-9-CM: V45.01 Vitals BP Pulse Height(growth percentile) Weight(growth percentile) SpO2 BMI  
 110/80 (BP 1 Location: Right arm, BP Patient Position: Sitting) 76 6' 2\" (1.88 m) 222 lb (100.7 kg) 99% 28.5 kg/m2 Smoking Status Never Smoker Vitals History BMI and BSA Data Body Mass Index Body Surface Area 28.5 kg/m 2 2.29 m 2 Preferred Pharmacy Pharmacy Name Phone [address] [facility] [phone] Your Updated Medication List  
  
   
This list is accurate as of: 4/18/17  4:09 PM.  Always use your most recent med list.  
  
  
  
  
 aspirin delayed-release 81 mg tablet Take 81 mg by mouth daily. atorvastatin 40 mg tablet Commonly known as:  LIPITOR  
TAKE 1 TABLET DAILY CLARITIN 10 mg tablet Generic drug:  loratadine  
take 10 mg by mouth daily. dabigatran etexilate 150 mg capsule Commonly known as:  PRADAXA TAKE 1 CAPSULE EVERY 12 HOURS  
  
 diclofenac sodium 20 mg/gram /actuation(2 %) Sopm  
Commonly known as:  PENNSAID  
 1 Applicatorful by Apply Externally route four (4) times daily. Indications: OSTEOARTHROSIS OF THE KNEE  
  
 FISH OIL PO Take 1 Tab by mouth daily. 1,500 IU each capsule GLUCOSAMINE-CHONDROITIN PO Take  by mouth. 1 tabs twice a day  
  
 ketoconazole 2 % topical cream  
Commonly known as:  NIZORAL  
  
 melatonin 5 mg Cap capsule Take 1 Cap by mouth nightly. PROBIOTIC 4X 10-15 mg Tbec Generic drug:  B.infantis-B.ani-B.long-B.bifi Take  by mouth. RAPAFLO 8 mg capsule Generic drug:  silodosin Take 8 mg by mouth daily (with breakfast). TOPROL XL 50 mg XL tablet Generic drug:  metoprolol succinate TAKE ONE AND ONE-HALF TABLETS DAILY  
  
 TYLENOL EXTRA STRENGTH 500 mg tablet Generic drug:  acetaminophen Take 2 tablets by mouth three (3) times daily as needed for Pain. VITAMIN D3 1,000 unit tablet Generic drug:  cholecalciferol Take  by mouth daily. Follow-up Instructions Return in about 6 months (around 10/18/2017). To-Do List   
 04/19/2017 12:30 PM  
  Appointment with Luz Maria Neal PT at [facility] [place] [address] ([phone]) Introducing [facility]! Dear Jessica Burch: 
Thank you for requesting a [employer] account. Our records indicate that you already have an active Danger account. You can access your account anytime at https://[employer] People to Remember/[employer] Did you know that you can access your hospital and ER discharge instructions at any time in Danger? You can also review all of your test results from your hospital stay or ER visit. Additional Information If you have questions, please visit the Frequently Asked Questions section of the Danger website at https://[employer] People [employer] Remember/[employer]. Remember, Danger is NOT to be used for urgent needs. For medical emergencies, dial 911. Now available from your iPhone and Android! Please provide this summary of care documentation to your next provider. Your primary care clinician is listed as Naya Zuniga. If you have any questions after today's visit, please call [phone].

## 2017-04-18 NOTE — PATIENT INSTRUCTIONS
We will continue your current medication regimen. Please notify us of any questions or concerns prior to your next office visit.

## 2017-04-18 NOTE — PROGRESS NOTES
HISTORY OF PRESENT ILLNESS  Cassidy Mancuso is a 66 y.o. male. Last seen 4 months ago. Admitted to [facility] 3/28/17 for PM generator change by Dr. Sara Garcia. Problem List  Date Reviewed: 4/18/2017          Codes Class Noted    Pacemaker generator end of life ICD-10-CM: Z45.010  ICD-9-CM: V53.31  3/21/2017        Pacemaker ICD-10-CM: Z95.0  ICD-9-CM: V45.01  12/28/2016        Gastroesophageal reflux disease without esophagitis ICD-10-CM: K21.9  ICD-9-CM: 530.81  8/5/2016        Advanced care planning/counseling discussion ICD-10-CM: Z71.89  ICD-9-CM: V65.49  5/5/2016    Overview Signed 5/5/2016  3:38 PM by Jason Russ RN     Patient states that a completed AMD is at home. NN encouraged patient to bring a copy to the office for scanning into the medical record. Patient verbalized understanding.                Bilateral carotid artery disease ([facility] [address] ICD-10-CM: I77.9  ICD-9-CM: 447.9  11/26/2014        Dyslipidemia ICD-10-CM: E78.5  ICD-9-CM: 272.4  6/5/2014        Elevated Lp(a) ICD-10-CM: S96.91  ICD-9-CM: 272.8  6/5/2014        Cerebral infarction [facility]) ICD-10-CM: I63.9  ICD-9-CM: 434.91  3/15/2014        James-tachy syndrome ([facility] [address] ICD-10-CM: I49.5  ICD-9-CM: 427.81  3/27/2012        Chronic maxillary sinusitis ICD-10-CM: J32.0  ICD-9-CM: 473.0  12/8/2011    Overview Signed 12/8/2011  3:23 PM by John Lawrence MD     Left chronic maxillary sinusitis             Family history of prostate cancer ICD-10-CM: Z80.42  ICD-9-CM: V16.42  9/16/2011        Scoliosis ICD-10-CM: M41.9  ICD-9-CM: 737.30  4/26/2011        Atrial fibrillation ([facility] [address] ICD-10-CM: I48.91  ICD-9-CM: 427.31  3/22/2010        BPH (benign prostatic hyperplasia) ICD-10-CM: N40.0  ICD-9-CM: 600.00  1/27/2010        OA (osteoarthritis) of knee ICD-10-CM: M17.10  ICD-9-CM: 715.36  2/11/2009        Skin moles ICD-10-CM: D22.9  ICD-9-CM: 216.9  2/11/2009        Colon polyp ICD-10-CM: K63.5  ICD-9-CM: 211.3  2/11/2009        AR (allergic rhinitis) ICD-10-CM: J30.9  ICD-9-CM: 477.9  2/11/2009        Thyroid nodule ICD-10-CM: E04.1  ICD-9-CM: 241.0  2/11/2009               Past Cardiac History (Megan Babin M.D.) :     Atrial fibrillation  -  dx 2003  - rythm management until 2008  -No structural or ischemic heart disease  Sinus node dysfunction s/p dual chamber pacer 8/09 ([facility])  Orthostatic hypotension  Carotid disease, mild to moderate       Cardiac testing  ETT April 2012 - 8 min, resting HR 81, peak   Carotid duplex 8/17/2012 - 10-49% bilateral  Echo 3/4/13: EF 55-60%; LA: moderately dilated; MV and TV: mild regurg.; no sig. hange from previous echo  3/2013 - abnormal overnight oximetry, normal sleep study  KACEY 4/17/2014 - no intracardiac mass/thrombus or shunt, mild to moderate aortic arch plaque, EF 50%, moderate LAE, mild MR/AR. Carotid duplex 4/15/14 - 10-49% plaque bilateral, unchanged  Carotid duplex 6/8/15: 10-49% bilateral stenosis; unchanged. Echo 12/21/15 - EF 65 %. No WMA. Moderate WYATT. Mild TR. Mild pulmonary HTN with PASP 38mmHg. Carotid duplex 6/24/16 - 10-49% bilaterally, unchanged    3/28/17- [facility] pacemaker generator change per  [facility]    Carotid duplex 4/18/17 - 10-49% bilaterally, unchanged. HPI  Mr. Misti Reaves remains active with bowling and walking. He denies any exertional chest pain, dyspnea, palpitations, syncope, orthopnea, edema or paroxysmal nocturnal dyspnea. Notes some increased fatigue, towards the end of the day, since his generator was changed. He is suspicious this \"may be due to my rate being set lower\". He denies any lightheadedness or dizziness. He continues to sleep well at night, but is napping each day. Appetite is intact. He denies any fever or chills. He denies any bleeding concerns on Pradaxa. Cardiac risk factors   HTN no  DM no  Smoking no      Current Outpatient Prescriptions   Medication Sig Dispense Refill    B.infantis-B.ani-B.long-B.bifi (PROBIOTIC 4X) 10-15 mg TbEC Take  by mouth.       dabigatran etexilate (PRADAXA) 150 mg capsule TAKE 1 CAPSULE EVERY 12 HOURS 180 Cap 3    TOPROL XL 50 mg XL tablet TAKE ONE AND ONE-HALF TABLETS DAILY 135 Tab 3    ketoconazole (NIZORAL) 2 % topical cream       melatonin 5 mg cap capsule Take 1 Cap by mouth nightly.  atorvastatin (LIPITOR) 40 mg tablet TAKE 1 TABLET DAILY 90 Tab 2    diclofenac sodium (PENNSAID) 20 mg/gram /actuation(2 %) sopm 1 Applicatorful by Apply Externally route four (4) times daily. Indications: OSTEOARTHROSIS OF THE KNEE 1 Bottle 4    acetaminophen (TYLENOL EXTRA STRENGTH) 500 mg tablet Take 2 tablets by mouth three (3) times daily as needed for Pain.  silodosin (RAPAFLO) 8 mg capsule Take 8 mg by mouth daily (with breakfast).  GLUCOSAMINE HCL/CHONDRO DONALDSON A (GLUCOSAMINE-CHONDROITIN PO) Take  by mouth. 1 tabs twice a day      aspirin delayed-release 81 mg tablet Take 81 mg by mouth daily.  cholecalciferol, vitamin d3, (VITAMIN D) 1,000 unit tablet Take  by mouth daily.  CLARITIN 10 mg Tab take 10 mg by mouth daily.  FISH OIL PO Take 1 Tab by mouth daily. 1,500 IU each capsule       Allergies   Allergen Reactions    Other Plant, Animal, Environmental Anaphylaxis     Entire body edema with fire ants    Amoxicillin Rash    Clindamycin Hives and Rash    Pcn [Penicillins] Rash     States he is able to tolerate cephalexin with no adverse reaction    Sulfa (Sulfonamide Antibiotics) Rash    Venom-Honey Bee Rash     Yellowjackets       Review of Systems   Constitutional: Negative for fever, chills, weight loss, and diaphoresis. Positive for fatigue. Respiratory: Negative for cough, hemoptysis, sputum production, shortness of breath and wheezing. Cardiovascular: Negative for chest pain, palpitations, orthopnea, claudication, leg swelling and PND. Gastrointestinal: Negative for heartburn, nausea, vomiting, blood in stool and melena. Genitourinary: Negative for dysuria, hematuria and flank pain. Neurological: Negative for speech change, focal weakness, seizures, loss of consciousness, weakness and headaches. Endo/Heme/Allergies: Does not bruise/bleed easily. Psychiatric/Behavioral: Negative for memory loss. The patient does not have insomnia. Visit Vitals    /80 (BP 1 Location: Right arm, BP Patient Position: Sitting)    Pulse 76    Ht 6' 2\" (1.88 m)    Wt 222 lb (100.7 kg)    SpO2 99%    BMI 28.5 kg/m2        Wt Readings from Last 3 Encounters:   04/18/17 222 lb (100.7 kg)   04/03/17 228 lb (103.4 kg)   03/28/17 220 lb 7.4 oz (100 kg)     Physical Exam   Vitals reviewed. Constitutional: He is oriented to person, place, and time. He appears well-developed. Head: Normocephalic. Neck: Neck supple. No JVD present. No tracheal deviation present. Cardiovascular: Irregular rhythm and normal heart sounds. Exam reveals no gallop and no friction rub. No murmur heard. Pulmonary/Chest: Effort normal and breath sounds normal. He has no wheezes. He has no rales. Abdominal: Soft. Bowel sounds are normal. No tenderness. Musculoskeletal: He exhibits no edema. Neurological: He is alert and oriented to person, place, and time. Skin: Skin is warm and dry. Psychiatric: He has a normal mood and affect.      Results for orders placed or performed in visit on 03/06/17   NMR LIPOPROFILE     Status: Abnormal   Result Value Ref Range Status    LDL-P 608 <1000 nmol/L Final     Comment:                           Low                   < 1000                            Moderate         1000 - 1299                            Borderline-High  1300 - 1599                            High             1600 - 2000                            Very High             > 2000      LDL-C 52 0 - 99 mg/dL Final     Comment:                           Optimal               <  100                            Above optimal     100 -  129                            Borderline        130 -  159 High              160 -  189                            Very high             >  189  LDL-C is inaccurate if patient is non-fasting. HDL-C 46 >39 mg/dL Final    Triglycerides 59 0 - 149 mg/dL Final    Cholesterol, Total 110 100 - 199 mg/dL Final    HDL-P (Total) 29.3 (L) >=30.5 umol/L Final    Small LDL-P 282 <=527 nmol/L Final    LDL size 20.3 >20.5 nm Final     Comment:  ----------------------------------------------------------                   ** INTERPRETATIVE INFORMATION**                   PARTICLE CONCENTRATION AND SIZE                      <--Lower CVD Risk   Higher CVD Risk-->    LDL AND HDL PARTICLES   Percentile in Reference Population    HDL-P (total)        High     75th    50th    25th   Low                         >34.9    34.9    30.5    26.7   <26.7    Small LDL-P          Low      25th    50th    75th   High                         <117     117     527     839    >839    LDL Size   <-Large (Pattern A)->    <-Small (Pattern B)->                      23.0    20.6           20.5      19.0   ----------------------------------------------------------  Small LDL-P and LDL Size are associated with CVD risk, but not after  LDL-P is taken into account. These assays were developed and their performance characteristics  determined by [employer]. These assays have not been cleared by the  [employer] and Drug Administration. The clinical utility o  f these  laboratory values have not been fully established. LP-IR SCORE <25 <=45 Final     Comment: INSULIN RESISTANCE MARKER      <--Insulin Sensitive    Insulin Resistant-->             Percentile in Reference Population  Insulin Resistance Score  LP-IR Score   Low   25th   50th   75th   High                <27   27     45     63     >63  LP-IR Score is inaccurate if patient is non-fasting.   The LP-IR score is a laboratory developed index that has been  associated with insulin resistance and diabetes risk and should be  used as one component of a physician's clinical assessment. The  LP-IR score listed above has not been cleared by the [employer] and  Drug Administration. Narrative    Performed at:  [address]  [id number]  : Anibal Raya MD, Phone:  [phone]     Lab Results   Component Value Date/Time    Sodium 143 03/20/2017 08:20 AM    Potassium 4.8 03/20/2017 08:20 AM    Chloride 104 03/20/2017 08:20 AM    CO2 23 03/20/2017 08:20 AM    Anion gap 9 11/30/2015 10:00 AM    Glucose 93 03/20/2017 08:20 AM    BUN 17 03/20/2017 08:20 AM    Creatinine 1.12 03/20/2017 08:20 AM    BUN/Creatinine ratio 15 03/20/2017 08:20 AM    GFR est AA 75 03/20/2017 08:20 AM    GFR est non-AA 65 03/20/2017 08:20 AM    Calcium 9.0 03/20/2017 08:20 AM    Bilirubin, total 1.0 03/20/2017 08:20 AM    AST (SGOT) 27 03/20/2017 08:20 AM    Alk. phosphatase 105 03/20/2017 08:20 AM    Protein, total 6.2 03/20/2017 08:20 AM    Albumin 4.2 03/20/2017 08:20 AM    Globulin 2.9 05/04/2010 10:00 AM    A-G Ratio 2.1 03/20/2017 08:20 AM    ALT (SGPT) 18 03/20/2017 08:20 AM     Cardiographics  Pacer function 3/06/14 - normal  EKG 3/06/14 - Ventricular paced, underlying AF  EKG 12/14/15 - V paced, underlying AF  EKG 6/24/16 - V paced, underlying AF  Carotid duplex 6/24/16 - 10-49% bilaterally, unchanged    ASSESSMENT and PLAN  Encounter Diagnoses   Name Primary?  Chronic atrial fibrillation (HCC) Yes    James-tachy syndrome (HCC)     Bilateral carotid artery disease (HCC)     Dyslipidemia     Elevated Lp(a)     Pacemaker       Mr. Ronni Price has permanent atrial fibrillation complicated by conduction disease s/p dual chamber PM in 2009 and generator change 1 month ago. His wound continues to heal well. His rate is well controlled on Toprol. No bleeding issues on Pradaxa. He has no structural or ischemic heart disease. Encouraged him to continue to remain active and monitor fatigue.  Advised him to notify us if this increases over the next month prior to his follow up visit with Dr. Doron Pineda. Carotid disease stable by repeat duplex today. Continue ASA and statin. Repeat advanced lipid testing with NMR demonstrates optimized lipoproteins and normal fasting glucose. The patient was encouraged to continue current medications, exercise, and call with any new complaints or concerns. Follow-up Disposition:  Return in about 6 months (around 10/18/2017).      APPLE Mccollum MD

## 2017-04-18 NOTE — PROGRESS NOTES
Visit Vitals    /80 (BP 1 Location: Right arm, BP Patient Position: Sitting)    Pulse 76    Ht 6' 2\" (1.88 m)    Wt 222 lb (100.7 kg)    SpO2 99%    BMI 28.5 kg/m2

## 2017-04-18 NOTE — PROCEDURES
[facility]  *** FINAL REPORT ***    Name: Reginaldo Norwood  MRN: [id number]       Outpatient  : 17 May 1945  HIS Order #: [id number]  [address] [facility] Visit #: [id number]  Date: 2017    TYPE OF TEST: Cerebrovascular Duplex    REASON FOR TEST  Known carotid stenosis    Right Carotid:-             Proximal               Mid                 Distal  cm/s  Systolic  Diastolic  Systolic  Diastolic  Systolic  Diastolic  CCA:    868.5      10.0                            58.0       8.0  Bulb:    38.0       6.0  ECA:     89.0       7.0  ICA:     51.0      12.0       78.0      18.0       42.0      15.0  ICA/CCA:  0.9       1.5    ICA Stenosis: Unknown    Right Vertebral:-  Finding: Antegrade  Sys:       52.0  Bouchra:       11.0    Right Subclavian:    Left Carotid:-            Proximal                Mid                 Distal  cm/s  Systolic  Diastolic  Systolic  Diastolic  Systolic  Diastolic  CCA:     01.9       9.0                            63.0       9.0  Bulb:   112.0       2.0  ECA:    112.0       2.0  ICA:     52.0      14.0       73.0      18.0       58.0      12.0  ICA/CCA:  0.8       1.6    ICA Stenosis: Unknown    Left Vertebral:-  Finding: Antegrade  Sys:       50.0  Bouchra:        0.0    Left Subclavian:    INTERPRETATION/FINDINGS  PROCEDURE:  Evaluation of the extracranial cerebrovascular arteries  with ultrasound (B-mode imaging, pulsed Doppler, color Doppler). Includes the common carotid, internal carotid, external carotid, and  vertebral arteries. FINDINGS:    Right: Mild heterogeneous plaque is visualized at the level of the  bifurcation and within the very proximal internal carotid artery. A  mild filling defect is noted on color flow imaging and peak systolic  velocities are recorded at 78 cm/s. Left: Mild heterogeneous plaque is exhibited within the proximal  internal carotid artery. There is a bend noted distal segment of the  ICA with no appreciable stenosis identified.   Peak systolic velocities   are recorded at 73 cm/s. IMPRESSION: Findings are consistent with 10-49% stenosis of the right  internal carotid and 10-49% stenosis of the left internal carotid. Vertebrals are patent with antegrade flow. COMPARISON:  In comparison to the previous study done on  6/24/2016,there is no evidence of a significant progression of  stenosis on today's exam.    ADDITIONAL COMMENTS    I have personally reviewed the data relevant to the interpretation of  this  study.     TECHNOLOGIST: DISHA Soto  Signed: 04/18/2017 03:36 PM    PHYSICIAN: Rachel Galvez MD  Signed: 04/21/2017 11:04 AM

## 2017-04-19 ENCOUNTER — HOSPITAL ENCOUNTER (OUTPATIENT)
Dept: PHYSICAL THERAPY | Age: 72
Discharge: HOME | End: 2017-04-19
Payer: MEDICARE

## 2017-04-19 PROCEDURE — G8978 MOBILITY CURRENT STATUS: HCPCS | Performed by: PHYSICAL THERAPIST

## 2017-04-19 PROCEDURE — 97110 THERAPEUTIC EXERCISES: CPT | Performed by: PHYSICAL THERAPIST

## 2017-04-19 PROCEDURE — 97162 PT EVAL MOD COMPLEX 30 MIN: CPT | Performed by: PHYSICAL THERAPIST

## 2017-04-19 PROCEDURE — G8979 MOBILITY GOAL STATUS: HCPCS | Performed by: PHYSICAL THERAPIST

## 2017-04-19 NOTE — PROGRESS NOTES
PT INITIAL EVALUATION NOTE - [facility] 2-15    Patient Name: Pavithra Cano  Date:2017  : 1945  [x]  Patient  Verified  Payor: Clau Mayscer / Plan: [place] & [place] / Product Type: Medicare /    In time:12:30 PM  Out time:1:30 PM  Total Treatment Time (min): 60  Total Timed Codes (min): 40  1:1 Treatment Time ( [address] only): 60   Visit #: 1     Treatment Area: Right hip pain [M25.551]    SUBJECTIVE  Pain Level (0-10 scale): 2  Any medication changes, allergies to medications, adverse drug reactions, diagnosis change, or new procedure performed?: [] No    [x] Yes (see summary sheet for update)  Subjective:    Chronic greater trochanteric pain syndrome  PLOF: No limitations with golf, standing, walking, regular weightlifting for exercise  Mechanism of Injury: Insidious over the past 4 months  Previous Treatment/Compliance: Cortisone injection by Dr. Vu Butts, orthopedist, two weeks ago. Since then he has had significant relief, but still has difficulty walking down stairs.   PMHx/Surgical Hx: CVD, pacemaker  Work Hx: Retired  Living Situation: lives at home with family  Pt Goals: to resolve pain  Barriers: none  Motivation: very motivated  Substance use: none  FABQ Score: low  Cognition: A & O x 3        OBJECTIVE/EXAMINATION  Gait and Functional Mobility:  Gait: WNL except for toes pointed inward bilaterally  Squat: Bilateral genu valgum  Step: Similar to squat, significant genu valgum with pain on right descent    Palpation: TTP along the greater trochanter of right hip  Swelling: None  Joint Mobility: NT     PROM:        R  L  Hip Flexion  120  120  Hip IR   30  30  Hip ER   25  45  Hip Extension  15  15          MMT:    R  L  Hip flexors  4/5  4/5  Hip adductors  3+/5  3+/5  Hip abductors  3+/5  3+/5  Hip ER   4/5  4/5  Hip IR   4/5  4/5    Neurological: Sensation: Intact  Special Tests:             Melissa: Positive          ELVIA: Negative   FAIR:Positive   Scour:Negative   Piriformis: Positive   Don's: Ant: Negative  Post: Negative   Single Leg Stance: Limited to 5-10 sec bilaterally over several trials   Trendelenberg: Positive bilaterally        Modality rationale: Patient declined   Min Type Additional Details    [] Estim: []Att   []Unatt        []TENS instruct                  []IFC  []Premod   []NMES                     []Other:  []w/US   []w/ice   []w/heat  Position:  Location:    []  Traction: [] Cervical       []Lumbar                       [] Prone          []Supine                       []Intermittent   []Continuous Lbs:  [] before manual  [] after manual  []w/heat    []  Ultrasound: []Continuous   [] Pulsed at:                           []1MHz   []3MHz Location:  W/cm2:    [] Paraffin         Location:   []w/heat    []  Ice     []  Heat  []  Ice massage Position:  Location:    []  Laser  []  Other: Position:  Location:      []  Vasopneumatic Device Pressure:       [] lo [] med [] hi   Temperature:      [x] Skin assessment post-treatment:  [x]intact []redness- no adverse reaction    []redness  adverse reaction:     40 min Therapeutic Exercise:  [x] See flow sheet :   Rationale: increase ROM, increase strength, improve coordination, improve balance and increase proprioception to improve the patients ability to walk without pain          With   [] TE   [] TA   [] neuro   [] other: Patient Education: [x] Review HEP    [] Progressed/Changed HEP based on:   [] positioning   [] body mechanics   [] transfers   [] heat/ice application    [] other:        Pain Level (0-10 scale) post treatment: 0    ASSESSMENT/Changes in Function:     [x]  See Plan of [employer], PT , DPT, OCS, Cert.  DN   4/19/2017  1:14 PM

## 2017-04-19 NOTE — PROGRESS NOTES
[address] Nick Valderrama [address]  Phone: [phone]  Fax: [phone]    Plan of Care/Statement of Necessity for Physical Therapy Services  2-15    Patient name: Coreen Bakc  : 1945  Provider#: [id number]  Referral source: Lee Mcduffie MD      Medical/Treatment Diagnosis: Right hip pain [M25.551]     Prior Hospitalization: see medical history     Comorbidities: CVD, pacemaker  Prior Level of Function: see initial eval  Medications: Verified on Patient Summary List  Start of Care: 15      Onset Date: 2017   The Plan of Care and following information is based on the information from the initial evaluation. Assessment/ key information: Patient presents with chronic GTPS with an insidious onset 4 months ago. Today he demonstrated significant weakness along the hip abd and ER's which is leading to genu valgum/hip IR during functional activities, such as squatting and descending stairs. Evaluation Complexity History MEDIUM  Complexity : 1-2 comorbidities / personal factors will impact the outcome/ POC ; Examination MEDIUM Complexity : 3 Standardized tests and measures addressing body structure, function, activity limitation and / or participation in recreation  ;Presentation MEDIUM Complexity : Evolving with changing characteristics  ; Clinical Decision Making MEDIUM Complexity : FOTO score of 26-74  Overall Complexity Rating: MEDIUM    Problem List: pain affecting function, decrease ROM, decrease strength, impaired gait/ balance, decrease ADL/ functional abilitiies, decrease activity tolerance, decrease flexibility/ joint mobility and decrease transfer abilities   Treatment Plan may include any combination of the following: Therapeutic exercise, Therapeutic activities, Neuromuscular re-education, Physical agent/modality, Manual therapy, Patient education, Self Care training, Functional mobility training, Home safety training and Stair training  Patient / Family readiness to learn indicated by: asking questions, trying to perform skills and interest  Persons(s) to be included in education: patient (P)  Barriers to Learning/Limitations: None  Patient Goal (s): I want to be able to go down stairs without pain.   Patient Self Reported Health Status: excellent  Rehabilitation Potential: excellent    Short Term Goals: To be accomplished in 8 treatments:  1. Patient will be able to squat through a full ROM with no pain or limitation. 2. Patient will be able to ambulate a curb with no pain or limitation. 3. Patient will be able to perform a sit-to-stand transfer with no UE support and no pain or limitation. Long Term Goals: To be accomplished in 16 treatments:  1. Patient will be able to walk 1 mile with no pain or limitation. 2. Patient will be able to ambulate a flight of stairs with no pain or limitation. 3. Patient will be able to squat and  20# from the floor with no pain or limitation. Frequency / Duration: Patient to be seen 2 times per week for 8 weeks. Patient/ Caregiver education and instruction: self care, activity modification and exercises    [x]  Plan of care has been reviewed with PTA    G-Codes (GP)  Mobility   Current  CK= 40-59%   Goal  CJ= 20-39%    The severity rating is based on clinical judgment and the FOTO Score score. Certification Period: 4/19/17 - 7/19/17  Arturo Dumont PT , DPT, OCS, Cert. DN   4/19/2017 1:14 PM    ________________________________________________________________________    I certify that the above Therapy Services are being furnished while the patient is under my care. I agree with the treatment plan and certify that this therapy is necessary.     [de-identified] Signature:____________________  Date:____________Time: _________

## 2017-04-27 ENCOUNTER — HOSPITAL ENCOUNTER (OUTPATIENT)
Dept: PHYSICAL THERAPY | Age: 72
Discharge: HOME | End: 2017-04-27
Payer: MEDICARE

## 2017-04-27 PROCEDURE — G8980 MOBILITY D/C STATUS: HCPCS | Performed by: PHYSICAL THERAPIST

## 2017-04-27 PROCEDURE — G8979 MOBILITY GOAL STATUS: HCPCS | Performed by: PHYSICAL THERAPIST

## 2017-04-27 PROCEDURE — 97110 THERAPEUTIC EXERCISES: CPT | Performed by: PHYSICAL THERAPIST

## 2017-04-27 NOTE — PROGRESS NOTES
PT DAILY TREATMENT NOTE - [facility] 2-15    Patient Name: Collin Hernandez  Date:2017  : 1945  [x]  Patient  Verified  Payor: Surekhachela Car / Plan: [place] A & B / Product Type: Medicare /    In time:8:45 AM  Out time:9:30 AM  Total Treatment Time (min): 45  Total Timed Codes (min): 45  1:1 Treatment Time ( [address] only): 39   Visit #: 1     Treatment Area: Right hip pain [M25.551]    SUBJECTIVE  Pain Level (0-10 scale): 0  Any medication changes, allergies to medications, adverse drug reactions, diagnosis change, or new procedure performed?: [x] No    [] Yes (see summary sheet for update)  Subjective functional status/changes:   [] No changes reported  Patient reports some low back pain initially last week, but otherwise he has been able to perform his HEP daily without an increase in pain.     OBJECTIVE    Modality rationale: Patient declined   Min Type Additional Details    [] Estim: []Att   []Unatt        []TENS instruct                  []IFC  []Premod   []NMES                     []Other:  []w/US   []w/ice   []w/heat  Position:  Location:    []  Traction: [] Cervical       []Lumbar                       [] Prone          []Supine                       []Intermittent   []Continuous Lbs:  [] before manual  [] after manual  []w/heat    []  Ultrasound: []Continuous   [] Pulsed at:                           []1MHz   []3MHz Location:  W/cm2:    [] Paraffin         Location:   []w/heat    []  Ice     []  Heat  []  Ice massage Position:  Location:    []  Laser  []  Other: Position:  Location:      []  Vasopneumatic Device Pressure:       [] lo [] med [] hi   Temperature:      [x] Skin assessment post-treatment:  [x]intact []redness- no adverse reaction    []redness  adverse reaction:     45 min Therapeutic Exercise:  [] See flow sheet :   Rationale: increase ROM, increase strength and improve coordination to improve the patients ability to ambulate down stairs without pain          With   [] TE   [] TA   [] neuro   [] other: Patient Education: [x] Review HEP    [] Progressed/Changed HEP based on:   [] positioning   [] body mechanics   [] transfers   [] heat/ice application    [] other:         Pain Level (0-10 scale) post treatment: 0    ASSESSMENT/Changes in Function:     Patient will continue to benefit from skilled PT services to modify and progress therapeutic interventions, address functional mobility deficits, address ROM deficits, address strength deficits, analyze and address soft tissue restrictions and analyze and cue movement patterns to attain remaining goals. []  See Plan of Care  []  See progress note/recertification  []  See Discharge Summary         Progress towards goals / Updated goals:  Patient tolerated today's progression of therapeutic exercises very well. He will continue his HEP for several weeks and f/u with his PCP for an updated PT script should he feel the need to come back. PLAN  [x]  Upgrade activities as tolerated     [x]  Continue plan of care  []  Update interventions per flow sheet       []  Discharge due to:_  []  Other:_      Valentín Le, PT , DPT, OCS, Cert.  DN   4/27/2017  9:35 AM

## 2017-05-05 ENCOUNTER — OFFICE VISIT (OUTPATIENT)
Dept: CARDIOLOGY CLINIC | Age: 72
End: 2017-05-05

## 2017-05-05 ENCOUNTER — CLINICAL SUPPORT (OUTPATIENT)
Dept: CARDIOLOGY CLINIC | Age: 72
End: 2017-05-05

## 2017-05-05 VITALS
SYSTOLIC BLOOD PRESSURE: 122 MMHG | DIASTOLIC BLOOD PRESSURE: 78 MMHG | WEIGHT: 224.2 LBS | BODY MASS INDEX: 28.77 KG/M2 | RESPIRATION RATE: 19 BRPM | HEART RATE: 62 BPM | OXYGEN SATURATION: 98 % | HEIGHT: 74 IN

## 2017-05-05 DIAGNOSIS — Z95.0 PACEMAKER: Primary | ICD-10-CM

## 2017-05-05 DIAGNOSIS — Z95.0 CARDIAC PACEMAKER IN SITU: Primary | ICD-10-CM

## 2017-05-05 NOTE — PROGRESS NOTES
HISTORY OF PRESENTING ILLNESS      Arvin Thomas is a 66 y.o. male with dual-chamber pacemaker, tachycardia/bradycardia syndrome, GERD, peripheral vascular disease, dyslipidemia, CVA, atrial fibrillation and BPH whose pacemaker was noted to be at [facility] and he underwent pacemaker generator change. He returns today for follow up with complaints of fatigue more than prior to generator change. His incision is healing well, dermabond is still present.         ACTIVE PROBLEM LIST     Patient Active Problem List    Diagnosis Date Noted    Pacemaker 12/28/2016    Gastroesophageal reflux disease without esophagitis 08/05/2016    Advanced care planning/counseling discussion 05/05/2016    Bilateral carotid artery disease ([address] 11/26/2014    Dyslipidemia 06/05/2014    Elevated Lp(a) 06/05/2014    Cerebral infarction ([address] 03/15/2014    James-tachy syndrome ([address] 03/27/2012    Chronic maxillary sinusitis 12/08/2011    Family history of prostate cancer 09/16/2011    Scoliosis 04/26/2011    Atrial fibrillation ([address] 03/22/2010    BPH (benign prostatic hyperplasia) 01/27/2010    OA (osteoarthritis) of knee 02/11/2009    Skin moles 02/11/2009    Colon polyp 02/11/2009    AR (allergic rhinitis) 02/11/2009    Thyroid nodule 02/11/2009           PAST MEDICAL HISTORY     Past Medical History:   Diagnosis Date    AR (allergic rhinitis) 2/11/2009    Atrial fibrillation ([address]     onset 2003, now chronic, CHADS2 1-2.     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 Torrey Patrick  25-30 yrs ago    STRESS TEST CARDIOLITE  4/2008    11 min., normal perfusion, EF 57%    VAS CAROTID DUPLEX BILATERAL  8/15/11    10-49% bilateral, unchanged from one year prior          ALLERGIES     Allergies   Allergen Reactions    Other Plant, Animal, Environmental Anaphylaxis     Entire body edema with fire ants    Amoxicillin Rash    Clindamycin Hives and Rash    Pcn [Penicillins] Rash     States he is able to tolerate cephalexin with no adverse reaction    Sulfa (Sulfonamide Antibiotics) Rash    Venom-Honey Bee Rash     Yellowjackets            FAMILY HISTORY     Family History   Problem Relation Age of Onset    Stroke Mother     Cancer Father      prostate    Hypertension Father     Diabetes Brother     Cancer Brother      prostate    Diabetes Brother     Hypertension Brother     Cancer Brother      melanoma    Cancer Daughter      melanoma    negative for cardiac disease       SOCIAL HISTORY     Social History     Social History    Marital status:      Spouse name: N/A    Number of children: N/A    Years of education: N/A     Social History Main Topics    Smoking status: Never Smoker    Smokeless tobacco: Never Used    Alcohol use No      Comment: no     Drug use: No    Sexual activity: Not Currently     Other Topics Concern    None Social History Narrative         MEDICATIONS     Current Outpatient Prescriptions   Medication Sig    B.infantis-B.ani-B.long-B.bifi (PROBIOTIC 4X) 10-15 mg TbEC Take  by mouth.  dabigatran etexilate (PRADAXA) 150 mg capsule TAKE 1 CAPSULE EVERY 12 HOURS    TOPROL XL 50 mg XL tablet TAKE ONE AND ONE-HALF TABLETS DAILY    ketoconazole (NIZORAL) 2 % topical cream     melatonin 5 mg cap capsule Take 1 Cap by mouth nightly.  atorvastatin (LIPITOR) 40 mg tablet TAKE 1 TABLET DAILY    diclofenac sodium (PENNSAID) 20 mg/gram /actuation(2 %) sopm 1 Applicatorful by Apply Externally route four (4) times daily. Indications: OSTEOARTHROSIS OF THE KNEE    acetaminophen (TYLENOL EXTRA STRENGTH) 500 mg tablet Take 2 tablets by mouth three (3) times daily as needed for Pain.  silodosin (RAPAFLO) 8 mg capsule Take 8 mg by mouth daily (with breakfast).  GLUCOSAMINE HCL/CHONDRO DONALDSON A (GLUCOSAMINE-CHONDROITIN PO) Take  by mouth. 1 tabs twice a day    aspirin delayed-release 81 mg tablet Take 81 mg by mouth daily.  cholecalciferol, vitamin d3, (VITAMIN D) 1,000 unit tablet Take  by mouth daily.  FISH OIL PO Take 1 Tab by mouth daily. 1,500 IU each capsule    CLARITIN 10 mg Tab take 10 mg by mouth daily. No current facility-administered medications for this visit. I have reviewed the nurses notes, vitals, problem list, allergy list, medical history, family, social history and medications. REVIEW OF SYMPTOMS      General: +fatigue, Pt denies excessive weight gain or loss. Pt is able to conduct ADL's  HEENT: Denies blurred vision, headaches, hearing loss, epistaxis and difficulty swallowing. Respiratory: Denies cough, congestion, shortness of breath, CONTRERAS, wheezing or stridor.   Cardiovascular: Denies precordial pain, palpitations, edema or PND  Gastrointestinal: Denies poor appetite, indigestion, abdominal pain or blood in stool  Genitourinary: Denies hematuria, dysuria, increased urinary frequency  Musculoskeletal: Denies joint pain or swelling from muscles or joints  Neurologic: Denies tremor, paresthesias, headache, or sensory motor disturbance  Psychiatric: Denies confusion, insomnia, depression  Integumentray: Denies rash, itching or ulcers. Hematologic: Denies easy bruising, bleeding       PHYSICAL EXAMINATION      Vitals:    05/05/17 1015   BP: 122/78   Pulse: 62   Resp: 19   SpO2: 98%   Weight: 224 lb 3.2 oz (101.7 kg)   Height: 6' 2\" (1.88 m)     General: Well developed, in no acute distress. HEENT: No jaundice, oral mucosa moist, no oral ulcers  Neck: Supple, no stiffness, no lymphadenopathy, supple  Heart:  Normal S1/S2 negative S3 or S4. Regular, no murmur, gallop or rub, no jugular venous distention  Respiratory: Clear bilaterally x 4, no wheezing or rales  Abdomen:   Soft, non-tender, bowel sounds are active.   Extremities:  No edema, normal cap refill, no cyanosis. Musculoskeletal: No clubbing, no deformities  Neuro: A&Ox3, speech clear, gait stable, cooperative, no focal neurologic deficits  Skin: Skin color is normal. No rashes or lesions.  Non diaphoretic, moist.  Vascular: 2+ pulses symmetric in all extremities       DIAGNOSTIC DATA      EKG:        LABORATORY DATA      Lab Results   Component Value Date/Time    WBC 5.6 03/20/2017 08:20 AM    HGB 16.4 03/20/2017 08:20 AM    HCT 48.5 03/20/2017 08:20 AM    PLATELET 801 79/95/7281 08:20 AM    MCV 89 03/20/2017 08:20 AM      Lab Results   Component Value Date/Time    Sodium 143 03/20/2017 08:20 AM    Potassium 4.8 03/20/2017 08:20 AM    Chloride 104 03/20/2017 08:20 AM    CO2 23 03/20/2017 08:20 AM    Anion gap 9 11/30/2015 10:00 AM    Glucose 93 03/20/2017 08:20 AM    BUN 17 03/20/2017 08:20 AM    Creatinine 1.12 03/20/2017 08:20 AM    BUN/Creatinine ratio 15 03/20/2017 08:20 AM    GFR est AA 75 03/20/2017 08:20 AM    GFR est non-AA 65 03/20/2017 08:20 AM    Calcium 9.0 03/20/2017 08:20 AM    Bilirubin, total 1.0 03/20/2017 08:20 AM    AST (SGOT) 27 03/20/2017 08:20 AM    Alk. phosphatase 105 03/20/2017 08:20 AM    Protein, total 6.2 03/20/2017 08:20 AM    Albumin 4.2 03/20/2017 08:20 AM    Globulin 2.9 05/04/2010 10:00 AM    A-G Ratio 2.1 03/20/2017 08:20 AM    ALT (SGPT) 18 03/20/2017 08:20 AM           ASSESSMENT      1. Pacemaker  2. Atrial fibrillation  3. Dyslipidemia  4. BPH  5. Peripheral vascular disease     PLAN     His device interrogation demonstrated normal functioning, however histogram did not reflect bell shaped curve and adjustments were provided in device clinic. Continue to monitor for fatigue. FOLLOW-UP   Follow up 1 year. Thank you, Juli Yoder MD and Dr. Kana Feng for allowing me to participate in the care of this extraordinarily pleasant male. Please do not hesitate to contact me for further questions/concerns.      APPLE Alfred MD  Cardiac Electrophysiology / Cardiology    [address]  [address] [place] [address] Josefina Romeoks[address] Russo StreetSandraMoberly Regional Medical Center  [phone] / [phone] Fax   [phone] / [phone] Fax

## 2017-05-05 NOTE — PROGRESS NOTES
Chief Complaint   Patient presents with    Wound Check     Pt here for device check, and pace maker wound check. Pt c/o feeling fatigue more often since having device placed.      Pt states that his temp has been running low

## 2017-05-22 ENCOUNTER — OFFICE VISIT (OUTPATIENT)
Dept: INTERNAL MEDICINE CLINIC | Age: 72
End: 2017-05-22

## 2017-05-22 VITALS
HEART RATE: 56 BPM | OXYGEN SATURATION: 99 % | HEIGHT: 74 IN | SYSTOLIC BLOOD PRESSURE: 124 MMHG | BODY MASS INDEX: 28.65 KG/M2 | DIASTOLIC BLOOD PRESSURE: 65 MMHG | TEMPERATURE: 97.4 F | RESPIRATION RATE: 18 BRPM | WEIGHT: 223.25 LBS

## 2017-05-22 DIAGNOSIS — Z71.89 ADVANCED CARE PLANNING/COUNSELING DISCUSSION: ICD-10-CM

## 2017-05-22 DIAGNOSIS — Z13.39 SCREENING FOR ALCOHOLISM: ICD-10-CM

## 2017-05-22 DIAGNOSIS — Z13.31 SCREENING FOR DEPRESSION: ICD-10-CM

## 2017-05-22 DIAGNOSIS — E78.5 DYSLIPIDEMIA: ICD-10-CM

## 2017-05-22 DIAGNOSIS — K21.9 GASTROESOPHAGEAL REFLUX DISEASE WITHOUT ESOPHAGITIS: ICD-10-CM

## 2017-05-22 DIAGNOSIS — Z00.00 MEDICARE ANNUAL WELLNESS VISIT, SUBSEQUENT: Primary | ICD-10-CM

## 2017-05-22 RX ORDER — DICLOFENAC SODIUM 10 MG/G
GEL TOPICAL AS NEEDED
COMMUNITY
Start: 2017-04-02

## 2017-05-22 NOTE — MR AVS SNAPSHOT
Visit Information Date & Time Provider Department Dept. Phone Encounter #  
 5/22/2017  1:00 PM Stephanie Marroquin MD [facility] [address] [id number] Follow-up Instructions Return in about 6 months (around 11/22/2017). Your Appointments 6/5/2017 11:45 AM  
PACEMAKER with PACEMAKER, STANGÉLICA  
[facility] ([facility] SCHEDULING) Appt Note: stj/pm/stf( rate resp f/u) [address] [place] [address] [facility] [address]  
[phone]  
  
   
 [address] [place] [address] [address]  
  
    
 10/24/2017  1:20 PM  
ESTABLISHED PATIENT with Marc Shankar MD  
[facility] ([facility]) Appt Note: 6 mo fup  
 [address] [place] [address]  
[address] [place] [address] [id number] [place] [address] 5/14/2018 10:30 AM  
PACEMAKER with PACEMAKER, CHAYO  
[facility] ([facility]UL[facility]) Appt Note: stj/pm/stf/merlin [address] [place] [address]  
[phone]  
  
    
 5/14/2018 11:00 AM  
ESTABLISHED PATIENT with Swati Lr MD  
[facility] ([facility]) Appt Note: 1 yr follow up  
 [address] [place] [address] [facility] [address]  
[phone]  
  
   
 [address] [place] [address] [address]  
  
    
  
 8/8/2017  1:15 PM  
REMOTE OFFICE VISIT with Marlena [employer] [facility] ([facility] SCHEDULING) Appt Note: merlin/pm/stf  
 [address] [facility] [address]  
[phone]  
  
   
 [address] [place] [address] [address]  
  
    
 11/9/2017 11:15 AM  
REMOTE OFFICE VISIT with Marlena [employer] [facility] ([facility] SCHEDULING) Appt Note: merlin/pm/stf  
 [address] [place] [address] [facility]  
[phone]  
  
    
 2/13/2018  9:45 AM  
REMOTE OFFICE VISIT with Mandi Estes [facility] ([facility] SCHEDULING) Appt Note: merlin/pm/stf  
 [address] [facility] [facility] [place] [address] [facility]  
[phone] Upcoming Health Maintenance Date Due  
 MEDICARE YEARLY EXAM 5/6/2017 INFLUENZA AGE 9 TO ADULT 8/1/2017 GLAUCOMA SCREENING Q2Y 2/22/2018 COLONOSCOPY 11/17/2024 DTaP/Tdap/Td series (2 - Td) 1/11/2026 Allergies as of 5/22/2017  Review Complete On: 5/22/2017 By: Simran hBandari LPN Severity Noted Reaction Type Reactions Other Plant, Animal, Environmental High 07/24/2015   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 Amoxicillin  10/28/2011    Rash Clindamycin  02/11/2009    Hives, Rash Pcn [Penicillins]  02/11/2009    Rash States he is able to tolerate cephalexin with no adverse reaction Sulfa (Sulfonamide Antibiotics)  02/21/2011    Rash Venom-honey Bee  08/18/2015    Rash Wildrake Rubi Current Immunizations  Reviewed on 5/5/2016 Name Date Influenza High Dose Vaccine PF 9/30/2016, 9/25/2015 Influenza Vaccine 9/22/2014 Influenza Vaccine (Whole Virus) 10/15/2012 Influenza Vaccine Split 10/24/2011 Pneumococcal Conjugate (PCV-13) 9/25/2015 Pneumococcal Vaccine (Unspecified Type) 10/15/2012, 10/24/2011 Tdap 1/11/2016 Zoster Vaccine, Live 7/28/2012 Not reviewed this visit You Were Diagnosed With   
  
 Codes Comments Gastroesophageal reflux disease without esophagitis    -  Primary ICD-10-CM: K21.9 ICD-9-CM: 530.81 Dyslipidemia     ICD-10-CM: E78.5 ICD-9-CM: 272.4 Vitals BP Pulse Temp Resp Height(growth percentile) Weight(growth percentile) 124/65 (BP 1 Location: Left arm, BP Patient Position: Sitting) (!) 56 97.4 °F (36.3 °C) (Oral) 18 6' 2\" (1.88 m) 223 lb 4 oz (101.3 kg) SpO2 BMI Smoking Status 99% 28.66 kg/m2 Never Smoker BMI and BSA Data Body Mass Index Body Surface Area  
 28.66 kg/m 2 2.3 m 2 Preferred Pharmacy Pharmacy Name Phone [address] [facility] [phone] Your Updated Medication List  
  
   
This list is accurate as of: 5/22/17  1:31 PM.  Always use your most recent med list.  
  
  
  
  
 aspirin delayed-release 81 mg tablet Take 81 mg by mouth daily. atorvastatin 40 mg tablet Commonly known as:  LIPITOR  
TAKE 1 TABLET DAILY CLARITIN 10 mg tablet Generic drug:  loratadine  
take 10 mg by mouth daily. dabigatran etexilate 150 mg capsule Commonly known as:  PRADAXA TAKE 1 CAPSULE EVERY 12 HOURS  
  
 * diclofenac sodium 20 mg/gram /actuation(2 %) Sopm  
Commonly known as:  PENNSAID  
1 Applicatorful by Apply Externally route four (4) times daily. Indications: OSTEOARTHROSIS OF THE KNEE * VOLTAREN 1 % Gel Generic drug:  diclofenac FISH OIL PO Take 1 Tab by mouth daily. 1,500 IU each capsule GLUCOSAMINE-CHONDROITIN PO Take  by mouth. 1 tabs twice a day  
  
 ketoconazole 2 % topical cream  
Commonly known as:  NIZORAL  
  
 melatonin 5 mg Cap capsule Take 1 Cap by mouth nightly. PROBIOTIC 4X 10-15 mg Tbec Generic drug:  B.infantis-B.ani-B.long-B.bifi Take  by mouth. RAPAFLO 8 mg capsule Generic drug:  silodosin Take 8 mg by mouth daily (with breakfast). TOPROL XL 50 mg XL tablet Generic drug:  metoprolol succinate TAKE ONE AND ONE-HALF TABLETS DAILY  
  
 TYLENOL EXTRA STRENGTH 500 mg tablet Generic drug:  acetaminophen Take 2 tablets by mouth three (3) times daily as needed for Pain. VITAMIN D3 1,000 unit tablet Generic drug:  cholecalciferol Take  by mouth daily. * Notice: This list has 2 medication(s) that are the same as other medications prescribed for you.  Read the directions carefully, and ask your doctor or other care provider to review them with you. Follow-up Instructions Return in about 6 months (around 11/22/2017). Introducing [facility]! Dear Amelie Griffith: 
Thank you for requesting a [employer] account. Our records indicate that you already have an active [employer] account. You can access your account anytime at [email or web address] Did you know that you can access your hospital and ER discharge instructions at any time in [employer]? You can also review all of your test results from your hospital stay or ER visit. Additional Information If you have questions, please visit the Frequently Asked Questions section of the [employer] website at [email or web address] Remember, [employer] is NOT to be used for urgent needs. For medical emergencies, dial 911. Now available from your iPhone and Android! Please provide this summary of care documentation to your next provider. Your primary care clinician is listed as Rosemary Huff. If you have any questions after today's visit, please call [phone].

## 2017-05-22 NOTE — PATIENT INSTRUCTIONS
Medicare Part B Preventive Services Guidelines/Limitations Date last completed and Frequency Due Date   Cardiovascular Screening Blood Tests (every 5 years)  Total cholesterol, HDL, Triglycerides Order as a panel if possible Completed 6/2015    As recommended by your PCP As recommended by your PCP or Specialist   Colorectal Cancer Screening  -Fecal occult blood test (annual)  -Flexible sigmoidoscopy (5y)  -Screening colonoscopy (10y)  -Barium Enema Age 49-80; After age [de-identified] if history of abnormal results Completed 11/17/2014     Recommended every 5 to 10 years  As recommended by your PCP or Specialist     Counseling to Prevent Tobacco Use (up to 8 sessions per year)  - Counseling greater than 3 and up to 10 minutes  - Counseling greater than 10 minutes Patients must be asymptomatic of tobacco-related conditions to receive as preventive service N/A N/A   Diabetes Screening Tests (at least every 3 years, Medicare covers annually or at 6-month intervals for prediabetic patients)    Fasting blood sugar (FBS) or glucose tolerance test (GTT) Patient must be diagnosed with one of the following:  -Hypertension, Dyslipidemia, obesity, previous impaired FBS or GTT  Or any two of the following: overweight, FH of diabetes, age ? 72, history of gestational diabetes, birth of baby weighing more than 9 pounds Completed 3/2017    Recommended every 3 years for non-diabetics   As recommended by your PCP or Specialist     Glaucoma Screening (no USPSTF recommendation) Diabetes mellitus, family history, , age 48 or over,  American, age 72 or over Completed within the last year    Recommended annually As recommended by your PCP or Specialist   Prostate Cancer Screening (annually up to age 76)  - Digital rectal exam (CARLOS)  - Prostate specific antigen (PSA) Annually (age 48 or over), CARLOS not paid separately when covered E/M service is provided on same date As recommended by your PCP or Specialist    Recommended annually to age 76 As recommended by your PCP or Specialist     Seasonal Influenza Vaccination (annually)  Completed 9/2016    Recommended Annually Completed for 2016 flu season. TDAP Vaccination  Completed 1/2016    Recommended every 10 years As recommended by your PCP or Specialist   Zoster (Shingles) Vaccination Covered by Medicare Part D through the pharmacy- PCP provides prescription Completed 7/2012    Recommended once over age 48  Complete   Pneumococcal Vaccination (once after 72)  Pneumo 23- 10/2012  Recommended once over the age of 72    Prevnar 15- 9/2015 Recommended once over the age of 72 Complete        Complete   Ultrasound Screening for Abdominal Aortic Aneurysm (AAA) (once) Patient must be referred through IPPE and not have had a screening for abdominal aortic aneurysm before under Medicare. Limited to patients who meet one of the following criteria:  - Men who are 73-68 years old and have smoked more than 100 cigarettes in their lifetime.  -Anyone with a FH of AAA  -Anyone recommended for screening by USPSTF Not indicated unless recommended by PCP   Not indicated unless recommended by PCP     Family Practice Management 2011    Please bring a copy of your completed advance medical directive to the office so it may be added to your medical record. Thank you. If you have any questions or concerns please feel free to contact me at [phone]. It was a pleasure meeting you today and participating in your healthcare.   Mary Tirado RN

## 2017-05-22 NOTE — PROGRESS NOTES
Nurse Navigator Medicare Wellness Visit performed by KATELYNN Guzmán RN    This is a Subsequent Mckenzie[employer]ed Visit providing Personalized Prevention Plan Services (PPPS) (Performed 12 months after initial AWV and PPPS )    I have reviewed the patient's medical history in detail and updated the computerized patient record. History     Past Medical History:   Diagnosis Date    AR (allergic rhinitis) 2/11/2009    Atrial fibrillation ([facility]     onset 2003, now chronic, CHADS2 1-2. 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 Meng Morales HX VASECTOMY  25-30 yrs ago    STRESS TEST CARDIOLITE  4/2008    11 min., normal perfusion, EF 57%    VAS CAROTID DUPLEX BILATERAL  8/15/11    10-49% bilateral, unchanged from one year prior     Current Outpatient Prescriptions   Medication Sig Dispense Refill    VOLTAREN 1 % gel       B.infantis-B.ani-B.long-B.bifi (PROBIOTIC 4X) 10-15 mg TbEC Take  by mouth.  dabigatran etexilate (PRADAXA) 150 mg capsule TAKE 1 CAPSULE EVERY 12 HOURS 180 Cap 3    TOPROL XL 50 mg XL tablet TAKE ONE AND ONE-HALF TABLETS DAILY 135 Tab 3    ketoconazole (NIZORAL) 2 % topical cream       melatonin 5 mg cap capsule Take 1 Cap by mouth nightly.  atorvastatin (LIPITOR) 40 mg tablet TAKE 1 TABLET DAILY 90 Tab 2    diclofenac sodium (PENNSAID) 20 mg/gram /actuation(2 %) sopm 1 Applicatorful by Apply Externally route four (4) times daily. Indications: OSTEOARTHROSIS OF THE KNEE 1 Bottle 4    acetaminophen (TYLENOL EXTRA STRENGTH) 500 mg tablet Take 2 tablets by mouth three (3) times daily as needed for Pain.  silodosin (RAPAFLO) 8 mg capsule Take 8 mg by mouth daily (with breakfast).  GLUCOSAMINE HCL/CHONDRO DONALDSON A (GLUCOSAMINE-CHONDROITIN PO) Take  by mouth. 1 tabs twice a day      aspirin delayed-release 81 mg tablet Take 81 mg by mouth daily.  cholecalciferol, vitamin d3, (VITAMIN D) 1,000 unit tablet Take  by mouth daily.  CLARITIN 10 mg Tab take 10 mg by mouth daily.  FISH OIL PO Take 1 Tab by mouth daily.  1,500 IU each capsule       Allergies   Allergen Reactions    Other Plant, Animal, Environmental Anaphylaxis     Entire body edema with fire ants    Amoxicillin Rash    Clindamycin Hives and Rash    Pcn [Penicillins] Rash     States he is able to tolerate cephalexin with no adverse reaction    Sulfa (Sulfonamide Antibiotics) Rash    Venom-Honey Bee Rash     Yellowjackets       Family History   Problem Relation Age of Onset    Stroke Mother     Cancer Father      prostate    Hypertension Father     Diabetes Brother     Cancer Brother      prostate    Diabetes Brother     Hypertension Brother     Cancer Brother      melanoma    Cancer Daughter      melanoma     Social History   Substance Use Topics    Smoking status: Never Smoker    Smokeless tobacco: Never Used    Alcohol use No      Comment: no      Patient Active Problem List   Diagnosis Code    OA (osteoarthritis) of knee M17.10    Skin moles D22.9    Colon polyp K63.5    AR (allergic rhinitis) J30.9    Thyroid nodule E04.1    BPH (benign prostatic hyperplasia) N40.0    Atrial fibrillation (HCC) I48.91    Scoliosis M41.9    Family history of prostate cancer Z80.42    Chronic maxillary sinusitis J32.0    James-tachy syndrome (HCC) I49.5    Dyslipidemia E78.5    Elevated Lp(a) E78.89    Cerebral infarction (HCC) I63.9    Bilateral carotid artery disease (HCC) I77.9    Advanced care planning/counseling discussion Z71.89    Gastroesophageal reflux disease without esophagitis K21.9    Pacemaker Z95.0       Depression Risk Factor Screening:   Patient denies feelings of being down, depressed or hopeless at this time. Patient states that they have a strong support system within their family & friends. PHQ over the last two weeks 5/22/2017   PHQ Not Done -   Little interest or pleasure in doing things Not at all   Feeling down, depressed or hopeless Not at all   Total Score PHQ 2 0     Alcohol Risk Factor Screening: On any occasion during the past 3 months, have you had more than 4 drinks containing alcohol? No    Do you average more than 14 drinks per week? No      Functional Ability and Level of Safety:     Hearing Loss   normal-to-mild    Activities of Daily Living   Self-care. Patient states that he lives with his wife in a private residence. Patient states independence in all ADLs & denies the use of assistive devices for ambulation. NN encouraged patient to continue and/ or introduce routine physical exercise into their daily routine as applicable & as recommended by PCP. Patient verbalized understanding & agreement to take this into consideration.    Requires assistance with:   ADL Assessment 5/22/2017   Feeding yourself No Help Needed   Getting from bed to chair No Help Needed   Getting dressed No Help Needed   Bathing or showering No Help Needed Walk across the room (includes cane/walker) No Help Needed   Using the telphone No Help Needed   Taking your medications No Help Needed   Preparing meals No Help Needed   Managing money (expenses/bills) No Help Needed   Moderately strenuous housework (laundry) No Help Needed   Shopping for personal items (toiletries/medicines) No Help Needed   Shopping for groceries No Help Needed   Driving No Help Needed   Climbing a flight of stairs No Help Needed   Getting to places beyond walking distances No Help Needed       Fall Risk   Patient reports one minor trip & fall in January 2017. Patient adds that the fall was at home. Patient denies injury at the time of the fall. Patient denies a visit to the ER/ MD at the time of the fall. Patient states that he was able to get himself up after the fall. Patient denies the use of assistive devices for ambulation. Patient denies any additional falls. Patient denies feeling dizzy, weak, lightheaded & states no loss of consciousness at the time of the fall. Patient verbalized fall prevention strategies. Fall Risk Assessment, last 12 mths 5/22/2017   Able to walk? Yes   Fall in past 12 months? Yes   Fall with injury? No   Number of falls in past 12 months 1   Fall Risk Score 1     Abuse Screen   Patient is not abused    Review of Systems   Medicare Wellness Visit    Physical Examination     Evaluation of Cognitive Function:  Mood/affect:  happy  Appearance: age appropriate and casually dressed  Family member/caregiver input: None present; however, patient reports a strong support system. No exam performed today, Medicare Wellness Visit.     Patient Care Team:  Emelia Nissen, MD as PCP - Sushila Spencer MD as Surgeon (Orthopedic Surgery)  Nicole Huff MD (Urology)  Denver Likes, MD (Ophthalmology)  Jewel Mccullough MD (Orthopedic Surgery)  Garo Neal RN as Ambulatory Care Navigator (Internal Medicine)    Advice/Referrals/Counseling   Education and counseling provided:  End-of-Life planning (with patient's consent)  Pneumococcal Vaccine  Influenza Vaccine  Prostate cancer screening tests (PSA, covered annually)  Colorectal cancer screening tests  Screening for glaucoma  tdap & shingles vaccinations      Assessment/Plan   1. Patient states that a completed Advanced Medical Directive is at home. NN encouraged patient to bring a copy of the completed Advanced Medical Directive to the office for scanning into the medical record. Patient verbalized understanding & agreement. 2. Patient is up to date on the following immunizations: flu vaccine (admin 9/2016), tdap vaccine (admin 1/2016), shingles vaccine (admin 7/2012), pneumonia 23 vaccine (admin 10/2012) & prevnar 13 vaccine (admin 9/2015). Patient confirmed the aforementioned preventative immunization dates are correct. Patient's health maintenance immunization record has been updated & is current. 3. Patient states that he follows his PCP's recommendations for PSA screenings & Colonoscopy screenings (report on file from 11/17/2014 performed by Dr. Edison Tong at [facility] with recommended screening follow-up in 5 years, 2019). Patient confirmed the aforementioned preventative screening dates. 4. Patient wears corrective lenses. Patient reports having a routine eye exam & glaucoma screening within the last year [facility] JESSIE faxed requesting a copy of patient's last eye exam with glaucoma screening with patient's verbal approval.     5. Please see fall risk screening section for additional information. Patient verbalized understanding of all information discussed. Patient was given the opportunity to ask questions. Medication reconciliation completed by MA/ LPN and reviewed by PCP. Patient provided AVS which includes Medicare Wellness Preventative Screening Table.

## 2017-05-22 NOTE — PROGRESS NOTES
HISTORY OF PRESENT ILLNESS  Jhonny Quezada is a 74 y.o. male. HPI  gastroesophageal reflux disease - occasional reflux symptoms after spicy foods. 1 time every 3 weeks or so. Uses zantac prn. Hyperlipidemia:  Jhonny Quezada is following up on his dyslipidemia. Cardiovascular risks for him are: LDL goal is under 80  prior CVA/TIA or known carotid artery disease. Currently he takes Lipitor (atorvastatin) ,   Lab Results   Component Value Date/Time    Cholesterol, total 114 11/30/2015 10:00 AM    Cholesterol, total 128 05/21/2015 09:00 AM    Cholesterol, total 126 11/11/2014 12:00 AM    Cholesterol, total 147 04/22/2014 11:02 AM    Cholesterol, total 147 12/13/2013 10:08 AM    Cholesterol, Total 110 03/20/2017 08:20 AM    Cholesterol, Total 110 12/12/2016 10:04 AM    HDL Cholesterol 48 11/30/2015 10:00 AM    HDL Cholesterol 50 05/21/2015 09:00 AM    HDL Cholesterol 45 11/11/2014 12:00 AM    HDL Cholesterol 51 04/22/2014 11:02 AM    HDL Cholesterol 48 12/13/2013 10:08 AM    LDL, calculated 65 11/30/2015 10:00 AM    LDL, calculated 68 05/21/2015 09:00 AM    LDL, calculated 66 11/11/2014 12:00 AM    LDL, calculated 98 04/22/2014 11:02 AM    LDL, calculated 87 12/13/2013 10:08 AM    Triglyceride 51 11/30/2015 10:00 AM    Triglyceride 64 05/21/2015 09:00 AM    Triglyceride 49 11/11/2014 12:00 AM    Triglyceride 66 04/22/2014 11:02 AM    Triglyceride 62 12/13/2013 10:08 AM    CHOL/HDL Ratio 2.8 05/04/2010 10:00 AM    CHOL/HDL Ratio 3.8 10/13/2009 08:20 AM    CHOL/HDL Ratio 3.3 02/18/2009 08:45 AM     Lab Results   Component Value Date/Time    ALT (SGPT) 18 03/20/2017 08:20 AM    AST (SGOT) 27 03/20/2017 08:20 AM    GGT 17 10/24/2011 09:10 AM    Alk. phosphatase 105 03/20/2017 08:20 AM    Bilirubin, direct 0.24 10/24/2011 09:10 AM    Bilirubin, total 1.0 03/20/2017 08:20 AM       Myalgias: no  Fatigue: no        Trochanteric bursitis. rec'd cortisone shot and PT - improved some.     Recent PM interrogation with  Bisi. Pt still feels mild fatigue but otherwise exercising fine. Patient Active Problem List   Diagnosis Code    OA (osteoarthritis) of knee M17.10    Skin moles D22.9    Colon polyp K63.5    AR (allergic rhinitis) J30.9    Thyroid nodule E04.1    BPH (benign prostatic hyperplasia) N40.0    Atrial fibrillation (HCC) I48.91    Scoliosis M41.9    Family history of prostate cancer Z80.42    Chronic maxillary sinusitis J32.0    James-tachy syndrome ([address] I49.5    Dyslipidemia E78.5    Elevated Lp(a) E78.89    Cerebral infarction (HCC) I63.9    Bilateral carotid artery disease (HCC) I77.9    Advanced care planning/counseling discussion Z71.89    Gastroesophageal reflux disease without esophagitis K21.9    Pacemaker Z95.0     Past Medical History:   Diagnosis Date    AR (allergic rhinitis) 2/11/2009    Atrial fibrillation ([address]     onset 2003, now chronic, CHADS2 1-2. 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  dabigatran etexilate (PRADAXA) 150 mg capsule TAKE 1 CAPSULE EVERY 12 HOURS 180 Cap 3    TOPROL XL 50 mg XL tablet TAKE ONE AND ONE-HALF TABLETS DAILY 135 Tab 3    ketoconazole (NIZORAL) 2 % topical cream       melatonin 5 mg cap capsule Take 1 Cap by mouth nightly.  atorvastatin (LIPITOR) 40 mg tablet TAKE 1 TABLET DAILY 90 Tab 2    diclofenac sodium (PENNSAID) 20 mg/gram /actuation(2 %) sopm 1 Applicatorful by Apply Externally route four (4) times daily. Indications: OSTEOARTHROSIS OF THE KNEE 1 Bottle 4    acetaminophen (TYLENOL EXTRA STRENGTH) 500 mg tablet Take 2 tablets by mouth three (3) times daily as needed for Pain.  silodosin (RAPAFLO) 8 mg capsule Take 8 mg by mouth daily (with breakfast).  GLUCOSAMINE HCL/CHONDRO DONALDSON A (GLUCOSAMINE-CHONDROITIN PO) Take  by mouth. 1 tabs twice a day      aspirin delayed-release 81 mg tablet Take 81 mg by mouth daily.  cholecalciferol, vitamin d3, (VITAMIN D) 1,000 unit tablet Take  by mouth daily.  CLARITIN 10 mg Tab take 10 mg by mouth daily.  FISH OIL PO Take 1 Tab by mouth daily. 1,500 IU each capsule       No current facility-administered medications on file prior to visit. Social History   Substance Use Topics    Smoking status: Never Smoker    Smokeless tobacco: Never Used    Alcohol use No      Comment: no              ROS    Physical Exam   Constitutional: He appears well-developed and well-nourished. No distress. /65 (BP 1 Location: Left arm, BP Patient Position: Sitting)  Pulse (!) 56  Temp 97.4 °F (36.3 °C) (Oral)   Resp 18  Ht 6' 2\" (1.88 m)  Wt 223 lb 4 oz (101.3 kg)  SpO2 99%  BMI 28.66 kg/m2Body mass index is 28.66 kg/(m^2).    HENT:   Mouth/Throat: Oropharynx is clear and moist.   Neck: No JVD present. Carotid bruit is not present. Cardiovascular: Normal rate, regular rhythm, normal heart sounds and intact distal pulses. Frequent extrasystoles are present. Pulses:       Posterior tibial pulses are 1+ on the right side, and 1+ on the left side. Pulmonary/Chest: Effort normal and breath sounds normal.   Musculoskeletal: He exhibits no edema. Neurological: He is alert. Skin: Skin is warm and dry. He is not diaphoretic. Nursing note and vitals reviewed. ASSESSMENT and PLAN  Abi Ledesma was seen today for gerd. Diagnoses and all orders for this visit:    Medicare annual wellness visit, subsequent  Ceci Sandoval received a complete medicare wellness assessment today by Fernando Burch RN. I've reviewed her assessment note and all screening/preventative plans addressed. I also addressed all questions and concerns surrounding the assessment and plans with Ceci Sandoval. Gastroesophageal reflux disease without esophagitis - zantac prn. Low acid content food. Dyslipidemia - Well controlled and stable. his medications were reviewed and refilled where necessary as noted below. Labs ordered as noted. Follow-up Disposition:  Return in about 6 months (around 11/22/2017).

## 2017-06-01 NOTE — PROGRESS NOTES
[address] [facility] [address]  Phone: [phone] Fax: [phone]      Discharge Summary 2-15    Patient name: Chico Oliver  : 1945  Provider#: [id number]  Referral source: Ana Brothers MD      Medical/Treatment Diagnosis: Right hip pain [M25.551]     Prior Hospitalization: see medical history     Comorbidities: See Plan of Care  Prior Level of Function: See Plan of Care  Medications: Verified on Patient Summary List    Start of Care: 17      Onset Date:2017   Visits from Start of Care: 2     Missed Visits: 0  Reporting Period : 17 to 17    Assessment/Summary of care: Patient was evaluated on 17 and treated with therapeutic exercise and the development of a home exercise program. He returned one week later and his home exercise program was updated with exercises to be performed at a local gym. I spoke with Mr. Jaime Quiroga today and he remains independent in his HEP and has experienced a full resolution of his symptoms. Short Term Goals: To be accomplished in 8 treatments:  1. Patient will be able to squat through a full ROM with no pain or limitation. Met.  2. Patient will be able to ambulate a curb with no pain or limitation. Met.  3. Patient will be able to perform a sit-to-stand transfer with no UE support and no pain or limitation. Met. Long Term Goals: To be accomplished in 16 treatments:  1. Patient will be able to walk 1 mile with no pain or limitation. Met.  2. Patient will be able to ambulate a flight of stairs with no pain or limitation. Met.  3. Patient will be able to squat and  20# from the floor with no pain or limitation. Met.    G-Codes (GP)  Mobility   [id number] Goal  CJ= 20-39%  D/C  CK= 40-59%    The severity rating is based on clinical judgment and the FOTO Score score.     RECOMMENDATIONS:  [x]Discontinue therapy: [x]Patient has reached or is progressing toward set goals     []Patient is non-compliant or has abdicated     []Due to lack of appreciable progress towards set goals     []Other  Helen Lacy PT , DPT, OCS, Cert.  DN   6/1/2017 12:33 PM

## 2017-06-05 ENCOUNTER — CLINICAL SUPPORT (OUTPATIENT)
Dept: CARDIOLOGY CLINIC | Age: 72
End: 2017-06-05

## 2017-06-05 DIAGNOSIS — Z95.0 CARDIAC PACEMAKER IN SITU: Primary | ICD-10-CM

## 2017-07-05 ENCOUNTER — HOSPITAL ENCOUNTER (OUTPATIENT)
Dept: GENERAL RADIOLOGY | Age: 72
Discharge: HOME | End: 2017-07-05
Attending: NURSE PRACTITIONER
Payer: MEDICARE

## 2017-07-05 ENCOUNTER — OFFICE VISIT (OUTPATIENT)
Dept: INTERNAL MEDICINE CLINIC | Age: 72
End: 2017-07-05

## 2017-07-05 VITALS
HEIGHT: 74 IN | BODY MASS INDEX: 29.05 KG/M2 | TEMPERATURE: 98.6 F | HEART RATE: 70 BPM | OXYGEN SATURATION: 97 % | RESPIRATION RATE: 14 BRPM | DIASTOLIC BLOOD PRESSURE: 69 MMHG | SYSTOLIC BLOOD PRESSURE: 114 MMHG | WEIGHT: 226.4 LBS

## 2017-07-05 DIAGNOSIS — H61.22 HEARING LOSS OF LEFT EAR DUE TO CERUMEN IMPACTION: ICD-10-CM

## 2017-07-05 DIAGNOSIS — R05.9 COUGH: ICD-10-CM

## 2017-07-05 DIAGNOSIS — I48.20 CHRONIC ATRIAL FIBRILLATION (HCC): ICD-10-CM

## 2017-07-05 DIAGNOSIS — J01.01 ACUTE RECURRENT MAXILLARY SINUSITIS: Primary | ICD-10-CM

## 2017-07-05 DIAGNOSIS — H65.01 RIGHT ACUTE SEROUS OTITIS MEDIA, RECURRENCE NOT SPECIFIED: ICD-10-CM

## 2017-07-05 PROCEDURE — 71020 XR CHEST PA LAT: CPT

## 2017-07-05 RX ORDER — FLUTICASONE PROPIONATE AND SALMETEROL 100; 50 UG/1; UG/1
1 POWDER RESPIRATORY (INHALATION) 2 TIMES DAILY
Qty: 1 INHALER | Refills: 0 | Status: SHIPPED | OUTPATIENT
Start: 2017-07-05 | End: 2018-04-10 | Stop reason: SDUPTHER

## 2017-07-05 RX ORDER — AZITHROMYCIN 250 MG/1
250 TABLET, FILM COATED ORAL SEE ADMIN INSTRUCTIONS
Qty: 6 TAB | Refills: 0 | Status: SHIPPED | OUTPATIENT
Start: 2017-07-05 | End: 2017-07-10

## 2017-07-05 NOTE — PROGRESS NOTES
HISTORY OF PRESENT ILLNESS  Devonna Collet is a 74 y.o. male. HPI  Upper respiratory illness:  Devonna Collet presents with complaints of congestion, sore throat, post nasal drip, cough described as harsh, painful, hoarse, headache, right, lower, upper sinus pain, right ear pain, suspected fevers but not measured at home and yellow nasal discharge for 7 days. no nausea and no vomiting . he has not had  Myalgias but has been feeling very fatigued. Symptoms are moderate. Over-the-counter remedies including Tylenol and mucinex   has been used with poor relief of symptoms. Has been feeling slight short of breath at times. Reports cough has been progressively worsening. Drinking plenty of fluids: yes  Asthma?:  no  non-smoker  Contacts with similar infections: yes -- his granddaughter was ill with similar symptoms but she is feeling better. Review of Systems   Constitutional: Positive for fever and malaise/fatigue. Negative for chills. HENT: Positive for congestion, ear pain, hearing loss and sore throat. Respiratory: Positive for cough and shortness of breath. Negative for sputum production. Cardiovascular: Negative for chest pain and palpitations. Gastrointestinal: Negative for nausea and vomiting. Genitourinary: Negative for dysuria and frequency. Musculoskeletal: Negative for myalgias. Skin: Negative for rash. Neurological: Positive for headaches. Negative for dizziness and tingling. /69 (BP 1 Location: Left arm, BP Patient Position: Sitting)  Pulse 70  Temp 98.6 °F (37 °C) (Oral)   Resp 14  Ht 6' 2\" (1.88 m)  Wt 226 lb 6.4 oz (102.7 kg)  SpO2 97%  BMI 29.07 kg/m2  Physical Exam   Constitutional: He is oriented to person, place, and time. He appears well-developed and well-nourished. Appears tired   HENT:   Head: Normocephalic and atraumatic. Right Ear: Tympanic membrane is injected. A middle ear effusion is present. Nose: Mucosal edema present.  Right sinus exhibits maxillary sinus tenderness and frontal sinus tenderness. Mouth/Throat: Posterior oropharyngeal erythema present. No posterior oropharyngeal edema. Left ear canal occluded with cerumen   Neck: Normal range of motion. Neck supple. No thyromegaly present. Cardiovascular: Normal heart sounds. An irregular rhythm present. Pulmonary/Chest: Effort normal. He has decreased breath sounds in the right lower field. He has wheezes in the right lower field. Abdominal: Soft. Bowel sounds are normal. There is no tenderness. Lymphadenopathy:     He has no cervical adenopathy. Neurological: He is alert and oriented to person, place, and time. Psychiatric: He has a normal mood and affect. His behavior is normal.   Nursing note and vitals reviewed. ASSESSMENT and PLAN  Jacob Deist was seen today for nasal congestion, headache, cough and ear pain. Diagnoses and all orders for this visit:    Acute recurrent maxillary sinusitis  -     azithromycin (ZITHROMAX) 250 mg tablet; Take 1 Tab by mouth See Admin Instructions for 5 days. Right acute serous otitis media, recurrence not specified  -     azithromycin (ZITHROMAX) 250 mg tablet; Take 1 Tab by mouth See Admin Instructions for 5 days. Cough -- concern regarding decreased RLL breath sounds; will check CXR; he declined on use of oral steroids for bronchospasm; will have him use Advair diskus twice daily for 2 weeks  -     XR CHEST PA LAT; Future  -     guaiFENesin-dextromethorphan SR (MUCINEX DM) 600-30 mg per tablet; Take 1 Tab by mouth every twelve (12) hours as needed for Cough. -     fluticasone-salmeterol (ADVAIR DISKUS) 100-50 mcg/dose diskus inhaler; Take 1 Puff by inhalation two (2) times a day.  Rinse mouth with water after each use    Hearing loss of left ear due to cerumen impaction -- large amount of cerumen removed from left ear canal; patient tolerated well  -     [place] REMOVAL IMPACTED CERUMEN IRRIGATION/LVG UNILAT    Ch[facility] ([facility]    Follow up if signs and symptoms worsen or change. After hours number given. lab results and schedule of future lab studies reviewed with patient  reviewed diet, exercise and weight control  reviewed medications and side effects in detail  This note will not be viewable in MyChart.

## 2017-07-05 NOTE — PATIENT INSTRUCTIONS
Middle Ear Fluid: Care Instructions  Your Care Instructions    Fluid often builds up inside the ear during a cold or allergies. Usually the fluid drains away, but sometimes a small tube in the ear, called the eustachian tube, stays blocked for months. Symptoms of fluid buildup may include:  · Popping, ringing, or a feeling of fullness or pressure in the ear. · Trouble hearing. · Balance problems and dizziness. In most cases, you can treat yourself at home. Follow-up care is a key part of your treatment and safety. Be sure to make and go to all appointments, and call your doctor if you are having problems. It's also a good idea to know your test results and keep a list of the medicines you take. How can you care for yourself at home? · In most cases, the fluid clears up within a few months without treatment. You may need more tests if the fluid does not clear up after 3 months. · If your doctor prescribed antibiotics, take them as directed. Do not stop taking them just because you feel better. You need to take the full course of antibiotics. When should you call for help? Call your doctor now or seek immediate medical care if:  · You have symptoms of infection, such as:  ¨ Increased pain, swelling, warmth, or redness. ¨ Pus draining from the area. ¨ A fever. Watch closely for changes in your health, and be sure to contact your doctor if:  · You notice changes in hearing. · You do not get better as expected. Where can you learn more? Go to http://roxana-vinny.info/. Enter U750 in the search box to learn more about \"Middle Ear Fluid: Care Instructions. \"  Current as of: July 29, 2016  Content Version: 11.3  © 9863-0175 Kidbox. Care instructions adapted under license by PLC Systems (which disclaims liability or warranty for this information).  If you have questions about a medical condition or this instruction, always ask your healthcare professional. [employer], [facility] disclaims any warranty or liability for your use of this information. Sinusitis: Care Instructions  Your Care Instructions    Sinusitis is an infection of the lining of the sinus cavities in your head. Sinusitis often follows a cold. It causes pain and pressure in your head and face. In most cases, sinusitis gets better on its own in 1 to 2 weeks. But some mild symptoms may last for several weeks. Sometimes antibiotics are needed. Follow-up care is a key part of your treatment and safety. Be sure to make and go to all appointments, and call your doctor if you are having problems. It's also a good idea to know your test results and keep a list of the medicines you take. How can you care for yourself at home? · Take an over-the-counter pain medicine, such as acetaminophen (Tylenol), ibuprofen (Advil, Motrin), or naproxen (Aleve). Read and follow all instructions on the label. · If the doctor prescribed antibiotics, take them as directed. Do not stop taking them just because you feel better. You need to take the full course of antibiotics. · Be careful when taking over-the-counter cold or flu medicines and Tylenol at the same time. Many of these medicines have acetaminophen, which is Tylenol. Read the labels to make sure that you are not taking more than the recommended dose. Too much acetaminophen (Tylenol) can be harmful. · Breathe warm, moist air from a steamy shower, a hot bath, or a sink filled with hot water. Avoid cold, dry air. Using a humidifier in your home may help. Follow the directions for cleaning the machine. · Use saline (saltwater) nasal washes to help keep your nasal passages open and wash out mucus and bacteria. You can buy saline nose drops at a grocery store or drugstore. Or you can make your own at home by adding 1 teaspoon of salt and 1 teaspoon of baking soda to 2 cups of distilled water.  If you make your own, fill a bulb syringe with the solution, insert the tip into your nostril, and squeeze gently. Lupie [place] your nose. · Put a hot, wet towel or a warm gel pack on your face 3 or 4 times a day for 5 to 10 minutes each time. · Try a decongestant nasal spray like oxymetazoline (Afrin). Do not use it for more than 3 days in a row. Using it for more than 3 days can make your congestion worse. When should you call for help? Call your doctor now or seek immediate medical care if:  · You have new or worse swelling or redness in your face or around your eyes. · You have a new or higher fever. Watch closely for changes in your health, and be sure to contact your doctor if:  · You have new or worse facial pain. · The mucus from your nose becomes thicker (like pus) or has new blood in it. · You are not getting better as expected. Where can you learn more? Go to http://roxana-vinny.info/. Enter L039 in the search box to learn more about \"Sinusitis: Care Instructions. \"  Current as of: July 29, 2016  Content Version: 11.3  © 8128-3789 Endeavour [employer]. Care instructions adapted under license by earthmine (which disclaims liability or warranty for this information). If you have questions about a medical condition or this instruction, always ask your healthcare professional. [address] any warranty or liability for your use of this information. Reactive Airway Disease: Care Instructions  Your Care Instructions  Reactive airway disease is a breathing problem that appears as wheezing, a whistling noise in your airways. It may be caused by a viral or bacterial infection, allergies, tobacco smoke, or something else in the environment. When you are around these triggers, your body releases chemicals that make the airways get tight. Reactive airway disease is a lot like asthma. Both can cause wheezing. But asthma is ongoing, while reactive airway disease may occur only now and then.  Tests can be done to tell whether you have asthma. You may take the same medicines used to treat asthma. Good home care and follow-up care with your doctor can help you recover. Follow-up care is a key part of your treatment and safety. Be sure to make and go to all appointments, and call your doctor if you are having problems. It's also a good idea to know your test results and keep a list of the medicines you take. How can you care for yourself at home? · Take your medicines exactly as prescribed. Call your doctor if you think you are having a problem with your medicine. · Do not smoke or allow others to smoke around you. If you need help quitting, talk to your doctor about stop-smoking programs and medicines. These can increase your chances of quitting for good. · If you know what caused your wheezing (such as perfume or the odor of household chemicals), try to avoid it in the future. · Wash your hands several times a day, and consider using hand gels or wipes that contain alcohol. This can prevent colds and other infections. When should you call for help? Call 911 anytime you think you may need emergency care. For example, call if:  · You have severe trouble breathing. Watch closely for changes in your health, and be sure to contact your doctor if:  · You cough up yellow, dark brown, or bloody mucus. · You have a fever. · Your wheezing gets worse. Where can you learn more? Go to http://roxana-vinny.info/. Enter N197 in the search box to learn more about \"Reactive Airway Disease: Care Instructions. \"  Current as of: March 25, 2017  Content Version: 11.3  © 5798-6802 Nebo. Care instructions adapted under license by [employer] (which disclaims liability or warranty for this information).  If you have questions about a medical condition or this instruction, always ask your healthcare professional. [address] any warranty or liability for your use of this information.

## 2017-07-05 NOTE — MR AVS SNAPSHOT
Visit Information Date & Time Provider Department Dept. Phone Encounter #  
 7/5/2017 11:20 AM Reena Jimenez NP [facility] [address] [id number] Your Appointments 10/24/2017  1:20 PM  
ESTABLISHED PATIENT with Arun Hu MD  
[facility] ([address]) Appt Note: 6 mo fup  
 [address] [place] [address] [address] [facility]  
[address] [place] [address] [address] 5/14/2018 11:00 AM  
ESTABLISHED PATIENT with Jeffrey Esparza MD  
[facility] ([address]) Appt Note: 1 yr follow up  
 [address] [place] [address] [facility] [address]  
[phone]  
  
   
 [address] [place] [address] [facility] [address] [address]  
  
    
 6/18/2018 11:30 AM  
PACEMAKER with PACEMAKER, STFRANCES  
[facility] (FREDDIE SCHEDULING) Appt Note: stj/pm/stf/merlin [address] [place] [address] [place] [address]  
[phone]  
  
   
 [address] [facility] [address] [place] [address] [address]  
  
    
  
 9/7/2017 11:15 AM  
REMOTE OFFICE VISIT with Calvillomichael Albrightmadi [facility] (FREDDIE SCHEDULING) Appt Note: merlin/pm/stf  
 [address] [place] [address] [facility] [address]  
[phone]  
  
   
 [address] [facility] [address] [place] [address] [place] [address] [address] [address]  
  
    
 12/12/2017 10:45 AM  
REMOTE OFFICE VISIT with Rajinder Sawyer [facility] ([facility] SCHEDULING) Appt Note: merlin/pm/stf  
 [address] [place] [address] [facility]  
[phone]  
  
    
 3/13/2018  8:45 AM  
REMOTE OFFICE VISIT with Calvillomichael Albrightmadi [facility] (FREDDIE SCHEDULING) Appt Note: merlin/pm/stf  
 [address] [place] [address] [address] [facility]  
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 Amoxicillin  10/28/2011    Rash Clindamycin  02/11/2009    Hives, Rash Pcn [Penicillins]  02/11/2009    Rash States he is able to tolerate cephalexin with no adverse reaction Sulfa (Sulfonamide Antibiotics)  02/21/2011    Rash Venom-honey Bee  08/18/2015    Rash [facility] [place] Current Immunizations  Reviewed on 5/5/2016 Name Date Influenza High Dose Vaccine PF 9/30/2016, 9/25/2015 Influenza Vaccine 9/22/2014 Influenza Vaccine (Whole Virus) 10/15/2012 Influenza Vaccine Split 10/24/2011 Pneumococcal Conjugate (PCV-13) 9/25/2015 Pneumococcal Vaccine (Unspecified Type) 10/15/2012, 10/24/2011 Tdap 1/11/2016 Zoster Vaccine, Live 7/28/2012 Not reviewed this visit You Were Diagnosed With   
  
 Codes Comments Acute recurrent maxillary sinusitis    -  Primary ICD-10-CM: J01.01 
ICD-9-CM: 461.0 Right acute serous otitis media, recurrence not specified     ICD-10-CM: H65.01 
ICD-9-CM: 381.01 Cough     ICD-10-CM: R05 ICD-9-CM: 786.2 Hearing loss of left ear due to cerumen impaction     ICD-10-CM: H61.22 
ICD-9-CM: 389.8, 380.4 Vitals BP Pulse Temp Resp Height(growth percentile) Weight(growth percentile) 114/69 (BP 1 Location: Left arm, BP Patient Position: Sitting) 70 98.6 °F (37 °C) (Oral) 14 6' 2\" (1.88 m) 226 lb 6.4 oz (102.7 kg) SpO2 BMI Smoking Status 97% 29.07 kg/m2 Never Smoker Vitals History BMI and BSA Data  Body Mass Index Body Surface Area  
 29.07 kg/m 2 2.32 m 2  
  
  
 Preferred Pharmacy Pharmacy Name Phone [facility] [phone] [facility] [phone] Your Updated Medication List  
  
   
This list is accurate as of: 7/5/17 12:21 PM.  Always use your most recent med list.  
  
  
  
  
 aspirin delayed-release 81 mg tablet Take 81 mg by mouth daily. atorvastatin 40 mg tablet Commonly known as:  LIPITOR  
TAKE 1 TABLET DAILY  
  
 azithromycin 250 mg tablet Commonly known as:  Malik Clos Take 1 Tab by mouth See Admin Instructions for 5 days. CLARITIN 10 mg tablet Generic drug:  loratadine  
take 10 mg by mouth daily. dabigatran etexilate 150 mg capsule Commonly known as:  PRADAXA TAKE 1 CAPSULE EVERY 12 HOURS  
  
 * diclofenac sodium 20 mg/gram /actuation(2 %) Sopm  
Commonly known as:  PENNSAID  
1 Applicatorful by Apply Externally route four (4) times daily. Indications: OSTEOARTHROSIS OF THE KNEE * VOLTAREN 1 % Gel Generic drug:  diclofenac FISH OIL PO Take 1 Tab by mouth daily. 1,500 IU each capsule  
  
 fluticasone-salmeterol 100-50 mcg/dose diskus inhaler Commonly known as:  ADVAIR DISKUS Take 1 Puff by inhalation two (2) times a day. Rinse mouth with water after each use GLUCOSAMINE-CHONDROITIN PO Take  by mouth. 1 tabs twice a day  
  
 guaiFENesin-dextromethorphan -30 mg per tablet Commonly known as:  [address] DM Take 1 Tab by mouth every twelve (12) hours as needed for Cough. ketoconazole 2 % topical cream  
Commonly known as:  NIZORAL  
  
 melatonin 5 mg Cap capsule Take 1 Cap by mouth nightly. PROBIOTIC 4X 10-15 mg Tbec Generic drug:  B.infantis-B.ani-B.long-B.bifi Take  by mouth. RAPAFLO 8 mg capsule Generic drug:  silodosin Take 8 mg by mouth daily (with breakfast). TOPROL XL 50 mg XL tablet Generic drug:  metoprolol succinate TAKE ONE AND ONE-HALF TABLETS DAILY  
  
 TYLENOL EXTRA STRENGTH 500 mg tablet Generic drug:  acetaminophen Take 2 tablets by mouth three (3) times daily as needed for Pain. VITAMIN D3 1,000 unit tablet Generic drug:  cholecalciferol Take  by mouth daily. * Notice: This list has 2 medication(s) that are the same as other medications prescribed for you. Read the directions carefully, and ask your doctor or other care provider to review them with you. Prescriptions Sent to Pharmacy Refills  
 azithromycin (ZITHROMAX) 250 mg tablet 0 Sig: Take 1 Tab by mouth See Admin Instructions for 5 days. Class: Normal  
 Pharmacy: [address] Ph #: [phone] Route: Oral  
 fluticasone-salmeterol (ADVAIR DISKUS) 100-50 mcg/dose diskus inhaler 0 Sig: Take 1 Puff by inhalation two (2) times a day. Rinse mouth with water after each use Class: Normal  
 Pharmacy: [address] Ph #: [phone] Route: Inhalation We Performed the Following UT REMOVAL IMPACTED CERUMEN IRRIGATION/LVG UNILAT [58389 CPT(R)] To-Do List   
 07/05/2017 Imaging:  XR CHEST PA LAT Patient Instructions Middle Ear Fluid: Care Instructions Your Care Instructions Fluid often builds up inside the ear during a cold or allergies. Usually the fluid drains away, but sometimes a small tube in the ear, called the eustachian tube, stays blocked for months. Symptoms of fluid buildup may include: · Popping, ringing, or a feeling of fullness or pressure in the ear. · Trouble hearing. · Balance problems and dizziness. In most cases, you can treat yourself at home. Follow-up care is a key part of your treatment and safety. Be sure to make and go to all appointments, and call your doctor if you are having problems. It's also a good idea to know your test results and keep a list of the medicines you take. How can you care for yourself at home? · In most cases, the fluid clears up within a few months without treatment. You may need more tests if the fluid does not clear up after 3 months. · If your doctor prescribed antibiotics, take them as directed. Do not stop taking them just because you feel better. You need to take the full course of antibiotics. When should you call for help? Call your doctor now or seek immediate medical care if: 
· You have symptoms of infection, such as: 
¨ Increased pain, swelling, warmth, or redness. ¨ Pus draining from the area. ¨ A fever. Watch closely for changes in your health, and be sure to contact your doctor if: 
· You notice changes in hearing. · You do not get better as expected. Where can you learn more? Go to http://roxana-vinny.info/. Enter M753 in the search box to learn more about \"Middle Ear Fluid: Care Instructions. \" Current as of: July 29, 2016 Content Version: 11.3 © 1469-3739 Take5. Care instructions adapted under license by [employer] (which disclaims liability or warranty for this information). If you have questions about a medical condition or this instruction, always ask your healthcare professional. [place] [address] any warranty or liability for your use of this information. Sinusitis: Care Instructions Your Care Instructions Sinusitis is an infection of the lining of the sinus cavities in your head. Sinusitis often follows a cold. It causes pain and pressure in your head and face. In most cases, sinusitis gets better on its own in 1 to 2 weeks. But some mild symptoms may last for several weeks. Sometimes antibiotics are needed. Follow-up care is a key part of your treatment and safety. Be sure to make and go to all appointments, and call your doctor if you are having problems. It's also a good idea to know your test results and keep a list of the medicines you take. How can you care for yourself at home? · Take an over-the-counter pain medicine, such as acetaminophen (Tylenol), ibuprofen (Advil, Motrin), or naproxen (Aleve). Read and follow all instructions on the label. · If the doctor prescribed antibiotics, take them as directed. Do not stop taking them just because you feel better. You need to take the full course of antibiotics. · Be careful when taking over-the-counter cold or flu medicines and Tylenol at the same time. Many of these medicines have acetaminophen, which is Tylenol. Read the labels to make sure that you are not taking more than the recommended dose. Too much acetaminophen (Tylenol) can be harmful. · Breathe warm, moist air from a steamy shower, a hot bath, or a sink filled with hot water. Avoid cold, dry air. Using a humidifier in your home may help. Follow the directions for cleaning the machine. · Use saline (saltwater) nasal washes to help keep your nasal passages open and wash out mucus and bacteria. You can buy saline nose drops at a grocery store or drugstore. Or you can make your own at home by adding 1 teaspoon of salt and 1 teaspoon of baking soda to 2 cups of distilled water. If you make your own, fill a bulb syringe with the solution, insert the tip into your nostril, and squeeze gently. Excell [employer] your nose. · Put a hot, wet towel or a warm gel pack on your face 3 or 4 times a day for 5 to 10 minutes each time. · Try a decongestant nasal spray like oxymetazoline (Afrin). Do not use it for more than 3 days in a row. Using it for more than 3 days can make your congestion worse. When should you call for help? Call your doctor now or seek immediate medical care if: 
· You have new or worse swelling or redness in your face or around your eyes. · You have a new or higher fever. Watch closely for changes in your health, and be sure to contact your doctor if: 
· You have new or worse facial pain. · The mucus from your nose becomes thicker (like pus) or has new blood in it. · You are not getting better as expected. Where can you learn more? Go to http://roxana-vinny.info/. Enter A834 in the search box to learn more about \"Sinusitis: Care Instructions. \" Current as of: July 29, 2016 Content Version: 11.3 © 7105-6923 Cyzone. Care instructions adapted under license by [employer] (which disclaims liability or warranty for this information). If you have questions about a medical condition or this instruction, always ask your healthcare professional. [place] [address] any warranty or liability for your use of this information. Reactive Airway Disease: Care Instructions Your Care Instructions Reactive airway disease is a breathing problem that appears as wheezing, a whistling noise in your airways. It may be caused by a viral or bacterial infection, allergies, tobacco smoke, or something else in the environment. When you are around these triggers, your body releases chemicals that make the airways get tight. Reactive airway disease is a lot like asthma. Both can cause wheezing. But asthma is ongoing, while reactive airway disease may occur only now and then. Tests can be done to tell whether you have asthma. You may take the same medicines used to treat asthma. Good home care and follow-up care with your doctor can help you recover. Follow-up care is a key part of your treatment and safety. Be sure to make and go to all appointments, and call your doctor if you are having problems. It's also a good idea to know your test results and keep a list of the medicines you take. How can you care for yourself at home? · Take your medicines exactly as prescribed. Call your doctor if you think you are having a problem with your medicine. · Do not smoke or allow others to smoke around you.  If you need help quitting, talk to your doctor about stop-smoking programs and medicines. These can increase your chances of quitting for good. · If you know what caused your wheezing (such as perfume or the odor of household chemicals), try to avoid it in the future. · Wash your hands several times a day, and consider using hand gels or wipes that contain alcohol. This can prevent colds and other infections. When should you call for help? Call 911 anytime you think you may need emergency care. For example, call if: 
· You have severe trouble breathing. Watch closely for changes in your health, and be sure to contact your doctor if: 
· You cough up yellow, dark brown, or bloody mucus. · You have a fever. · Your wheezing gets worse. Where can you learn more? Go to http://roxana-vinny.info/. Enter R524 in the search box to learn more about \"Reactive Airway Disease: Care Instructions. \" Current as of: March 25, 2017 Content Version: 11.3 © 0114-5950 Jott. Care instructions adapted under license by myAchy (which disclaims liability or warranty for this information). If you have questions about a medical condition or this instruction, always ask your healthcare professional. [place] [address] any warranty or liability for your use of this information. Introducing [facility]! Dear Palmer Valladares: 
Thank you for requesting a StockStreams account. Our records indicate that you already have an active StockStreams account. You can access your account anytime at [email or web address] Did you know that you can access your hospital and ER discharge instructions at any time in StockStreams? You can also review all of your test results from your hospital stay or ER visit. Additional Information If you have questions, please visit the Frequently Asked Questions section of the StockStreams website at [email or web address] Remember, MyChart is NOT to be used for urgent needs. For medical emergencies, dial 911. Now available from your iPhone and Android! Please provide this summary of care documentation to your next provider. Your primary care clinician is listed as Wendy Lowery. If you have any questions after today's visit, please call [phone].

## 2017-09-07 ENCOUNTER — OFFICE VISIT (OUTPATIENT)
Dept: CARDIOLOGY CLINIC | Age: 72
End: 2017-09-07

## 2017-09-07 DIAGNOSIS — Z95.0 CARDIAC PACEMAKER IN SITU: Primary | ICD-10-CM

## 2017-10-24 ENCOUNTER — OFFICE VISIT (OUTPATIENT)
Dept: CARDIOLOGY CLINIC | Age: 72
End: 2017-10-24

## 2017-10-24 VITALS
WEIGHT: 222.8 LBS | SYSTOLIC BLOOD PRESSURE: 120 MMHG | OXYGEN SATURATION: 96 % | RESPIRATION RATE: 20 BRPM | BODY MASS INDEX: 28.59 KG/M2 | DIASTOLIC BLOOD PRESSURE: 76 MMHG | HEIGHT: 74 IN | HEART RATE: 59 BPM

## 2017-10-24 DIAGNOSIS — I48.20 CHRONIC ATRIAL FIBRILLATION (HCC): Primary | ICD-10-CM

## 2017-10-24 DIAGNOSIS — E78.41 ELEVATED LP(A): ICD-10-CM

## 2017-10-24 DIAGNOSIS — I63.9 CEREBRAL INFARCTION, UNSPECIFIED MECHANISM (HCC): ICD-10-CM

## 2017-10-24 DIAGNOSIS — I49.5 BRADY-TACHY SYNDROME (HCC): ICD-10-CM

## 2017-10-24 DIAGNOSIS — Z95.0 PACEMAKER: ICD-10-CM

## 2017-10-24 DIAGNOSIS — I77.9 BILATERAL CAROTID ARTERY DISEASE (HCC): ICD-10-CM

## 2017-10-24 DIAGNOSIS — E78.5 DYSLIPIDEMIA: ICD-10-CM

## 2017-10-24 NOTE — PROGRESS NOTES
Chief Complaint   Patient presents with    Coronary Artery Disease     6 month follow up    Pacemaker Check     Patient stated he can see a vein sticking up around pacemaker sometimes and he can feel it. 1. Have you been to the ER, urgent care clinic since your last visit? Hospitalized since your last visit? 2. Have you seen or consulted any other health care providers outside of the [address] [place] [address] since your last visit? Include any pap smears or colon screening. Patient stated he goes to [address] [place] [address] at [facility] to see Dr. An Lay for bilateral knee pain 4/2017.

## 2017-10-24 NOTE — PROGRESS NOTES
HISTORY OF PRESENT ILLNESS  Yvon Farah is a 74 y.o. male. Last seen 6 months ago. Problem List  Date Reviewed: 7/5/2017          Codes Class Noted    Pacemaker ICD-10-CM: Z95.0  ICD-9-CM: V45.01  12/28/2016        Gastroesophageal reflux disease without esophagitis ICD-10-CM: K21.9  ICD-9-CM: 530.81  8/5/2016        Advanced care planning/counseling discussion ICD-10-CM: Z71.89  ICD-9-CM: V65.49  5/5/2016    Overview Signed 5/5/2016  3:38 PM by Phillip Pete RN     Patient states that a completed AMD is at home. NN encouraged patient to bring a copy to the office for scanning into the medical record. Patient verbalized understanding.                Bilateral carotid artery disease ([facility] [address] ICD-10-CM: I77.9  ICD-9-CM: 447.9  11/26/2014        Dyslipidemia ICD-10-CM: E78.5  ICD-9-CM: 272.4  6/5/2014        Elevated Lp(a) ICD-10-CM: U68.96  ICD-9-CM: 272.8  6/5/2014        Cerebral infarction [facility]) ICD-10-CM: I63.9  ICD-9-CM: 434.91  3/15/2014        James-tachy syndrome ([facility] [address] ICD-10-CM: I49.5  ICD-9-CM: 427.81  3/27/2012        Chronic maxillary sinusitis ICD-10-CM: J32.0  ICD-9-CM: 473.0  12/8/2011    Overview Signed 12/8/2011  3:23 PM by Oziel Frederick MD     Left chronic maxillary sinusitis             Family history of prostate cancer ICD-10-CM: Z80.42  ICD-9-CM: V16.42  9/16/2011        Scoliosis ICD-10-CM: M41.9  ICD-9-CM: 737.30  4/26/2011        Atrial fibrillation ([facility] [address] ICD-10-CM: I48.91  ICD-9-CM: 427.31  3/22/2010        BPH (benign prostatic hyperplasia) ICD-10-CM: N40.0  ICD-9-CM: 600.00  1/27/2010        OA (osteoarthritis) of knee ICD-10-CM: M17.10  ICD-9-CM: 715.36  2/11/2009        Skin moles ICD-10-CM: D22.9  ICD-9-CM: 216.9  2/11/2009        Colon polyp ICD-10-CM: K63.5  ICD-9-CM: 211.3  2/11/2009        AR (allergic rhinitis) ICD-10-CM: J30.9  ICD-9-CM: 477.9  2/11/2009        Thyroid nodule ICD-10-CM: E04.1  ICD-9-CM: 241.0  2/11/2009               Past Cardiac History ([place] Vang M.D.) :     Atrial fibrillation  -  dx 2003  - rythm management until 2008  -No structural or ischemic heart disease  Sinus node dysfunction s/p dual chamber pacer 8/09 PATIENTS [facility])  Orthostatic hypotension  Carotid disease, mild to moderate       Cardiac testing  ETT April 2012 - 8 min, resting HR 81, peak   Carotid duplex 8/17/2012 - 10-49% bilateral  Echo 3/4/13: EF 55-60%; LA: moderately dilated; MV and TV: mild regurg.; no sig. hange from previous echo  3/2013 - abnormal overnight oximetry, normal sleep study  KACEY 4/17/2014 - no intracardiac mass/thrombus or shunt, mild to moderate aortic arch plaque, EF 50%, moderate LAE, mild MR/AR. Carotid duplex 4/15/14 - 10-49% plaque bilateral, unchanged  Carotid duplex 6/8/15: 10-49% bilateral stenosis; unchanged. Echo 12/21/15 - EF 65 %. No WMA. Moderate WYATT. Mild TR. Mild pulmonary HTN with PASP 38mmHg. Carotid duplex 6/24/16 - 10-49% bilaterally, unchanged    3/28/17- [facility] pacemaker generator change per Dr. Miguel Huerta    Carotid duplex 4/18/17 - 10-49% bilaterally, unchanged. HPI  Mr. Checo Mccarthy has noticed intermittent dizziness after standing which will resolve after standing still for a few seconds. He also has noticed episodes of dysequilibrium while walking with longest episode lasting a few days. He denies any tinnitus or nausea. He stays active without exertional sxs. He occasionally has a very pronounced vein that goes under his pacemaker to his left arm. Patient denies any exertional chest pain, dyspnea, palpitations, syncope, orthopnea, edema or paroxysmal nocturnal dyspnea. He denies any bleeding issues on Pradaxa. He gets remote pacemaker checks.      He has elevated PSA monitored by Dr. Wanda Mcneill    Cardiac risk factors   HTN no  DM no  Smoking no      Current Outpatient Prescriptions   Medication Sig Dispense Refill    atorvastatin (LIPITOR) 40 mg tablet TAKE 1 TABLET DAILY 90 Tab 5    VOLTAREN 1 % gel       B.infantis-B.ani-B.long-B.bifi (PROBIOTIC 4X) 10-15 mg TbEC Take  by mouth.  dabigatran etexilate (PRADAXA) 150 mg capsule TAKE 1 CAPSULE EVERY 12 HOURS 180 Cap 3    TOPROL XL 50 mg XL tablet TAKE ONE AND ONE-HALF TABLETS DAILY 135 Tab 3    ketoconazole (NIZORAL) 2 % topical cream       melatonin 5 mg cap capsule Take 1 Cap by mouth nightly.  diclofenac sodium (PENNSAID) 20 mg/gram /actuation(2 %) sopm 1 Applicatorful by Apply Externally route four (4) times daily. Indications: OSTEOARTHROSIS OF THE KNEE 1 Bottle 4    acetaminophen (TYLENOL EXTRA STRENGTH) 500 mg tablet Take 2 tablets by mouth three (3) times daily as needed for Pain.  GLUCOSAMINE HCL/CHONDRO DONALDSON A (GLUCOSAMINE-CHONDROITIN PO) Take  by mouth. 1 tabs twice a day      aspirin delayed-release 81 mg tablet Take 81 mg by mouth daily.  cholecalciferol, vitamin d3, (VITAMIN D) 1,000 unit tablet Take  by mouth daily.  CLARITIN 10 mg Tab take 10 mg by mouth daily.  FISH OIL PO Take 1 Tab by mouth daily. 1,500 IU each capsule      guaiFENesin-dextromethorphan SR (MUCINEX DM) 600-30 mg per tablet Take 1 Tab by mouth every twelve (12) hours as needed for Cough. 30 Tab 0    fluticasone-salmeterol (ADVAIR DISKUS) 100-50 mcg/dose diskus inhaler Take 1 Puff by inhalation two (2) times a day. Rinse mouth with water after each use 1 Inhaler 0    silodosin (RAPAFLO) 8 mg capsule Take 8 mg by mouth daily (with breakfast). Allergies   Allergen Reactions    Other Plant, Animal, Environmental Anaphylaxis     Entire body edema with fire ants    Amoxicillin Rash    Clindamycin Hives and Rash    Pcn [Penicillins] Rash     States he is able to tolerate cephalexin with no adverse reaction    Sulfa (Sulfonamide Antibiotics) Rash    Venom-Honey Bee Rash     Yellowjackets       Review of Systems   Constitutional: Negative for fever, chills, weight loss, and diaphoresis.    Respiratory: Negative for cough, hemoptysis, sputum production, shortness of breath and wheezing. Cardiovascular: Negative for chest pain, palpitations, orthopnea, claudication, leg swelling and PND. Gastrointestinal: Negative for heartburn, nausea, vomiting, blood in stool and melena. Genitourinary: Negative for dysuria, hematuria and flank pain. Neurological: Negative for speech change, focal weakness, seizures, loss of consciousness, weakness and headaches. +intermittent dizziness, +intermittent dysequilibrium  Endo/Heme/Allergies: Does not bruise/bleed easily. Psychiatric/Behavioral: Negative for memory loss. The patient does not have insomnia. Visit Vitals    /76 (BP 1 Location: Left arm, BP Patient Position: Sitting)    Pulse (!) 59    Resp 20    Ht 6' 2\" (1.88 m)    Wt 222 lb 12.8 oz (101.1 kg)    SpO2 96%    BMI 28.61 kg/m2        Wt Readings from Last 3 Encounters:   10/24/17 222 lb 12.8 oz (101.1 kg)   07/05/17 226 lb 6.4 oz (102.7 kg)   05/22/17 223 lb 4 oz (101.3 kg)     Physical Exam   Vitals reviewed. Constitutional: He is oriented to person, place, and time. He appears well-developed. Head: Normocephalic. Neck: Neck supple. No JVD present. No tracheal deviation present. Cardiovascular: Irregular rhythm and normal heart sounds. Exam reveals no gallop and no friction rub. No murmur heard. Pulmonary/Chest: Effort normal and breath sounds normal. He has no wheezes. He has no rales. Abdominal: Soft. Bowel sounds are normal. No tenderness. Musculoskeletal: He exhibits no edema. Neurological: He is alert and oriented to person, place, and time. Skin: Skin is warm and dry. Psychiatric: He has a normal mood and affect.      Results for orders placed or performed in visit on 03/06/17   NMR LIPOPROFILE     Status: Abnormal   Result Value Ref Range Status    LDL-P 608 <1000 nmol/L Final     Comment:                           Low                   < 1000                            Moderate         1000 - 1299                            Borderline-High 1300 - 1599                            High             1600 - 2000                            Very High             > 2000      LDL-C 52 0 - 99 mg/dL Final     Comment:                           Optimal               <  100                            Above optimal     100 -  129                            Borderline        130 -  159                            High              160 -  189                            Very high             >  189  LDL-C is inaccurate if patient is non-fasting. HDL-C 46 >39 mg/dL Final    Triglycerides 59 0 - 149 mg/dL Final    Cholesterol, Total 110 100 - 199 mg/dL Final    HDL-P (Total) 29.3 (L) >=30.5 umol/L Final    Small LDL-P 282 <=527 nmol/L Final    LDL size 20.3 >20.5 nm Final     Comment:  ----------------------------------------------------------                   ** INTERPRETATIVE INFORMATION**                   PARTICLE CONCENTRATION AND SIZE                      <--Lower CVD Risk   Higher CVD Risk-->    LDL AND HDL PARTICLES   Percentile in Reference Population    HDL-P (total)        High     75th    50th    25th   Low                         >34.9    34.9    30.5    26.7   <26.7    Small LDL-P          Low      25th    50th    75th   High                         <117     117     527     839    >839    LDL Size   <-Large (Pattern A)->    <-Small (Pattern B)->                      23.0    20.6           20.5      19.0   ----------------------------------------------------------  Small LDL-P and LDL Size are associated with CVD risk, but not after  LDL-P is taken into account. These assays were developed and their performance characteristics  determined by [employer]. These assays have not been cleared by the  [employer] and Drug Administration. The clinical utility o  f these  laboratory values have not been fully established.       LP-IR SCORE <25 <=45 Final     Comment: INSULIN RESISTANCE MARKER      <--Insulin Sensitive    Insulin Resistant-->             Percentile in Reference Population  Insulin Resistance Score  LP-IR Score   Low   25th   50th   75th   High                <27   27     45     63     >63  LP-IR Score is inaccurate if patient is non-fasting. The LP-IR score is a laboratory developed index that has been  associated with insulin resistance and diabetes risk and should be  used as one component of a physician's clinical assessment. The  LP-IR score listed above has not been cleared by the [employer] and  Drug Administration. Narrative    Performed at:  [address]  [id number]  : Srini Asher MD, Phone:  [phone]     Lab Results   Component Value Date/Time    Sodium 143 03/20/2017 08:20 AM    Potassium 4.8 03/20/2017 08:20 AM    Chloride 104 03/20/2017 08:20 AM    CO2 23 03/20/2017 08:20 AM    Anion gap 9 11/30/2015 10:00 AM    Glucose 93 03/20/2017 08:20 AM    BUN 17 03/20/2017 08:20 AM    Creatinine 1.12 03/20/2017 08:20 AM    BUN/Creatinine ratio 15 03/20/2017 08:20 AM    GFR est AA 75 03/20/2017 08:20 AM    GFR est non-AA 65 03/20/2017 08:20 AM    Calcium 9.0 03/20/2017 08:20 AM    Bilirubin, total 1.0 03/20/2017 08:20 AM    AST (SGOT) 27 03/20/2017 08:20 AM    Alk. phosphatase 105 03/20/2017 08:20 AM    Protein, total 6.2 03/20/2017 08:20 AM    Albumin 4.2 03/20/2017 08:20 AM    Globulin 2.9 05/04/2010 10:00 AM    A-G Ratio 2.1 03/20/2017 08:20 AM    ALT (SGPT) 18 03/20/2017 08:20 AM     Cardiographics  Pacer function 3/06/14 - normal  EKG 3/06/14 - Ventricular paced, underlying AF  EKG 12/14/15 - V paced, underlying AF  EKG 6/24/16 - V paced, underlying AF  Carotid duplex 6/24/16 - 10-49% bilaterally, unchanged    ASSESSMENT and PLAN  Encounter Diagnoses   Name Primary?     Dyslipidemia     Elevated Lp(a)     Cerebral infarction, unspecified mechanism (HCC)     James-tachy syndrome (HCC)     Chronic atrial fibrillation (HCC) Yes    Pacemaker     Bilateral carotid artery disease ([facility] [address] Mr. Monty Davis has permanent atrial fibrillation complicated by conduction disease s/p dual chamber PM in 2009 and generator change 6 month agos. He is chronically RV paced. No bleeding issues on Pradaxa. Intermittent dizziness is suggestive of balance issues. If this progresses I have asked to discuss further with Dr. [employer] Mild carotid disease by duplex study 6 months ago, unchanged from 2012. Repeat duplex in 2 years. Update lipids with NMR in 6 months. Follow-up Disposition:  Return in about 6 months (around 4/24/2018).    With fasting labs 2 weeks prior    Written by Nikki Little, dictated by Bridger Butterfield MD.  Bridger Butterfield MD

## 2017-11-27 RX ORDER — METOPROLOL SUCCINATE 50 MG/1
TABLET, EXTENDED RELEASE ORAL
Qty: 135 TAB | Refills: 3 | Status: SHIPPED | OUTPATIENT
Start: 2017-11-27 | End: 2018-12-10 | Stop reason: SDUPTHER

## 2017-12-12 ENCOUNTER — OFFICE VISIT (OUTPATIENT)
Dept: CARDIOLOGY CLINIC | Age: 72
End: 2017-12-12

## 2017-12-12 DIAGNOSIS — Z95.0 CARDIAC PACEMAKER IN SITU: Primary | ICD-10-CM

## 2018-02-19 DIAGNOSIS — I48.20 CHRONIC ATRIAL FIBRILLATION (HCC): ICD-10-CM

## 2018-02-27 RX ORDER — DABIGATRAN ETEXILATE MESYLATE 150 MG/1
CAPSULE ORAL
Qty: 180 CAP | Refills: 3 | Status: SHIPPED | OUTPATIENT
Start: 2018-02-27 | End: 2019-02-22 | Stop reason: SDUPTHER

## 2018-03-13 ENCOUNTER — OFFICE VISIT (OUTPATIENT)
Dept: CARDIOLOGY CLINIC | Age: 73
End: 2018-03-13

## 2018-03-13 DIAGNOSIS — Z95.0 CARDIAC PACEMAKER IN SITU: Primary | ICD-10-CM

## 2018-03-15 RX ORDER — OSELTAMIVIR PHOSPHATE 75 MG/1
75 CAPSULE ORAL 2 TIMES DAILY
Qty: 10 CAP | Refills: 0 | Status: SHIPPED | OUTPATIENT
Start: 2018-03-15 | End: 2018-03-20 | Stop reason: ALTCHOICE

## 2018-03-20 ENCOUNTER — OFFICE VISIT (OUTPATIENT)
Dept: INTERNAL MEDICINE CLINIC | Age: 73
End: 2018-03-20

## 2018-03-20 VITALS
RESPIRATION RATE: 18 BRPM | SYSTOLIC BLOOD PRESSURE: 132 MMHG | HEART RATE: 61 BPM | TEMPERATURE: 97.9 F | DIASTOLIC BLOOD PRESSURE: 81 MMHG | HEIGHT: 74 IN | WEIGHT: 228.25 LBS | OXYGEN SATURATION: 97 % | BODY MASS INDEX: 29.29 KG/M2

## 2018-03-20 DIAGNOSIS — J11.1 INFLUENZA: Primary | ICD-10-CM

## 2018-03-20 LAB
QUICKVUE INFLUENZA TEST: POSITIVE
VALID INTERNAL CONTROL?: YES

## 2018-03-20 RX ORDER — DEXTROMETHORPHAN POLISTIREX 30 MG/5ML
60 SUSPENSION ORAL
COMMUNITY
End: 2019-02-15

## 2018-03-20 NOTE — PROGRESS NOTES
HISTORY OF PRESENT ILLNESS  Greg Jose is a 74 y.o. male. HPI  Upper respiratory illness:  Greg Jose presents with complaints of sneezing, sore throat, cough described as productive of clear sputum and headache for 6 days. nausea and no vomiting, mild diarrhea earlier . he has not had  night sweats, myalgias and fever. Symptoms are moderate. Over-the-counter remedies including he got Tamiflu from NP started at onset of symptoms since wife diagnosed with flu  . Mucinex, tylenol, delsym has been used with poor relief of symptoms. Drinking plenty of fluids: yes  Asthma?:  no  non-smoker  Contacts with similar infections: yes     Patient Active Problem List   Diagnosis Code    OA (osteoarthritis) of knee M17.10    Skin moles D22.9    Colon polyp K63.5    AR (allergic rhinitis) J30.9    Thyroid nodule E04.1    BPH (benign prostatic hyperplasia) N40.0    Atrial fibrillation (HCC) I48.91    Scoliosis M41.9    Family history of prostate cancer Z80.42    Chronic maxillary sinusitis J32.0    James-tachy syndrome ([address] I49.5    Dyslipidemia E78.5    Elevated Lp(a) E78.89    Cerebral infarction (HCC) I63.9    Bilateral carotid artery disease (HCC) I77.9    Advanced care planning/counseling discussion Z71.89    Gastroesophageal reflux disease without esophagitis K21.9    Pacemaker Z95.0     Past Medical History:   Diagnosis Date    AR (allergic rhinitis) 2/11/2009    Atrial fibrillation ([address]     onset 2003, now chronic, CHADS2 1-2.     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    Thyroid nodule 2/11/2009     Allergies   Allergen Reactions    Other Plant, Animal, Environmental Anaphylaxis     Entire body edema with fire ants    Amoxicillin Rash    Clindamycin Hives and Rash    Pcn [Penicillins] Rash     States he is able to tolerate cephalexin with no adverse reaction    Sulfa (Sulfonamide Antibiotics) Rash    Venom-Honey Bee Rash     Yellowjackets       Current Outpatient Prescriptions on File Prior to Visit   Medication Sig Dispense Refill    PRADAXA 150 mg capsule TAKE 1 CAPSULE EVERY 12 HOURS 180 Cap 3    TOPROL XL 50 mg XL tablet TAKE ONE AND ONE-HALF TABLETS DAILY 135 Tab 3    guaiFENesin-dextromethorphan SR (MUCINEX DM) 600-30 mg per tablet Take 1 Tab by mouth every twelve (12) hours as needed for Cough. 30 Tab 0    fluticasone-salmeterol (ADVAIR DISKUS) 100-50 mcg/dose diskus inhaler Take 1 Puff by inhalation two (2) times a day. Rinse mouth with water after each use 1 Inhaler 0    atorvastatin (LIPITOR) 40 mg tablet TAKE 1 TABLET DAILY 90 Tab 5    VOLTAREN 1 % gel       B.infantis-B.ani-B.long-B.bifi (PROBIOTIC 4X) 10-15 mg TbEC Take  by mouth.  ketoconazole (NIZORAL) 2 % topical cream       melatonin 5 mg cap capsule Take 1 Cap by mouth nightly.  diclofenac sodium (PENNSAID) 20 mg/gram /actuation(2 %) sopm 1 Applicatorful by Apply Externally route four (4) times daily. Indications: OSTEOARTHROSIS OF THE KNEE 1 Bottle 4    acetaminophen (TYLENOL EXTRA STRENGTH) 500 mg tablet Take 2 tablets by mouth three (3) times daily as needed for Pain.  silodosin (RAPAFLO) 8 mg capsule Take 8 mg by mouth daily (with breakfast).  GLUCOSAMINE HCL/CHONDRO DONALDSON A (GLUCOSAMINE-CHONDROITIN PO) Take  by mouth.  1 tabs twice a day      aspirin delayed-release 81 mg tablet Take 81 mg by mouth daily.  cholecalciferol, vitamin d3, (VITAMIN D) 1,000 unit tablet Take  by mouth daily.  CLARITIN 10 mg Tab take 10 mg by mouth daily.  FISH OIL PO Take 1 Tab by mouth daily. 1,500 IU each capsule       No current facility-administered medications on file prior to visit. Social History   Substance Use Topics    Smoking status: Never Smoker    Smokeless tobacco: Never Used    Alcohol use No      Comment: no              ROS    Physical Exam   Constitutional: He appears well-developed and well-nourished. No distress. /81 (BP 1 Location: Left arm, BP Patient Position: Sitting)  Pulse 61  Temp 97.9 °F (36.6 °C) (Oral)   Resp 18  Ht 6' 2\" (1.88 m)  Wt 228 lb 4 oz (103.5 kg)  SpO2 97%  BMI 29.31 kg/m2   HENT:   Head: Normocephalic and atraumatic. Right Ear: Tympanic membrane and ear canal normal. No decreased hearing is noted. Left Ear: Tympanic membrane and ear canal normal. No decreased hearing is noted. Nose: Mucosal edema and rhinorrhea present. No epistaxis. Right sinus exhibits no maxillary sinus tenderness and no frontal sinus tenderness. Left sinus exhibits no maxillary sinus tenderness and no frontal sinus tenderness. Mouth/Throat: Uvula is midline and mucous membranes are normal. No oral lesions. Normal dentition. Posterior oropharyngeal erythema present. No oropharyngeal exudate, posterior oropharyngeal edema or tonsillar abscesses. Eyes: Conjunctivae are normal. Pupils are equal, round, and reactive to light. Right eye exhibits no discharge. Left eye exhibits no discharge. No scleral icterus. Neck: Neck supple. Cardiovascular: Normal rate, regular rhythm and normal heart sounds. Pulmonary/Chest: Effort normal and breath sounds normal. No accessory muscle usage. No respiratory distress. He has no decreased breath sounds. He has no wheezes. He has no rhonchi. He has no rales. Lymphadenopathy:     He has no cervical adenopathy. Neurological: He is alert. Skin: Skin is warm and dry. He is not diaphoretic. Nursing note and vitals reviewed. Rapid Influenza nasal test was Positive for B      ASSESSMENT and PLAN  Diagnoses and all orders for this visit:    1. Influenza  -     AMB POC RAPID INFLUENZA TEST  He declines antitussive. Unable to use nsaids given he's on NOAC. Tylenol 1g / 8 hours for symptoms. mucinex DM. Aggressive hydration. Counseled on alarm symptoms of community-acquired pneumonia  Janethmary ann Sam was advised to drink plenty of liquids, get plenty of rest and try tylenol with food 3 times daily as needed for bodyaches, fever or malaise. he should use mucinex if coughing or having sinus drainage. Return to work or school when no fever for 24 hours while off of fever reducing medication. he should call the office or come to the emergency room for progressive symptoms of fatigue or lethargy, shortness of breath, chest pains, inability to hold down fluids, stiff neck or other new symptoms. Janethmary ann Sam was given written educational materials on influenza at the end of the visit. Follow-up Disposition:  Return if symptoms worsen or fail to improve.

## 2018-03-20 NOTE — PATIENT INSTRUCTIONS

## 2018-03-20 NOTE — MR AVS SNAPSHOT
303 [facility] [place] 
 
 
 [address] [facility] 1900 [facility] 
[phone] Patient: Vincenzo Ruiz MRN: [id number] Visit Information Date & Time Provider Department Dept. Phone Encounter #  
 3/20/2018  3:45 PM Dena Gates MD [facility] [address] [facility] [id number] Follow-up Instructions Return if symptoms worsen or fail to improve. Your Appointments 5/1/2018  1:00 PM  
ESTABLISHED PATIENT with Carola Rubi MD  
[facility] (FREDDIE SCHEDULING) Appt Note: 6 month follow up  
 320 [facility] [address] [place] [address]  
54 [facility] [address] 5/14/2018 11:00 AM  
ESTABLISHED PATIENT with Cricket Gómez MD  
[facility] ([address]) Appt Note: 1 yr follow up  
 320 [address] [address]  
[phone]  
  
   
 320 [place] [address]  
  
    
 6/18/2018 11:30 AM  
PACEMAKER with PACEMAKER, STFRANCES  
[facility] (FREDDIE SCHEDULING) Appt Note: stj/pm/stf/merlin 320 [facility] [address] [place] [address] [address] [facility] Upcoming Health Maintenance Date Due Influenza Age 5 to Adult 8/1/2017 MEDICARE YEARLY EXAM 5/23/2018 GLAUCOMA SCREENING Q2Y 5/22/2019 DTaP/Tdap/Td series (2 - Td) 1/11/2026 Allergies as of 3/20/2018  Review Complete On: 10/24/2017 By: Doris Daniels LPN Severity Noted Reaction Type Reactions Other Plant, Animal, Environmental High 07/24/2015   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 Amoxicillin  10/28/2011    Rash Clindamycin  02/11/2009    Hives, Rash Pcn [Penicillins]  02/11/2009    Rash States he is able to tolerate cephalexin with no adverse reaction Sulfa (Sulfonamide Antibiotics)  02/21/2011    Rash Venom-honey Bee  08/18/2015    Rash Sylvia Senior Current Immunizations  Reviewed on 5/5/2016 Name Date Influenza High Dose Vaccine PF 9/30/2016, 9/25/2015 Influenza Vaccine 9/22/2014 Influenza Vaccine (Whole Virus) 10/15/2012 Influenza Vaccine Split 10/24/2011 Pneumococcal Conjugate (PCV-13) 9/25/2015 Pneumococcal Vaccine (Unspecified Type) 10/15/2012 Tdap 1/11/2016 ZZZ-RETIRED (DO NOT USE) Pneumococcal Vaccine (Unspecified Type) 10/24/2011 Zoster Vaccine, Live 7/28/2012 Not reviewed this visit You Were Diagnosed With   
  
 Codes Comments Influenza    -  Primary ICD-10-CM: J11.1 ICD-9-CM: 487. 1 Vitals BP Pulse Temp Resp Height(growth percentile) Weight(growth percentile) 132/81 (BP 1 Location: Left arm, BP Patient Position: Sitting) 61 97.9 °F (36.6 °C) (Oral) 18 6' 2\" (1.88 m) 228 lb 4 oz (103.5 kg) SpO2 BMI Smoking Status 97% 29.31 kg/m2 Never Smoker Vitals History BMI and BSA Data Body Mass Index Body Surface Area  
 29.31 kg/m 2 2.32 m 2 Preferred Pharmacy Pharmacy Name Phone [facility] [address] [place], [place] [address]y [address]  [facility] [phone] Your Updated Medication List  
  
   
This list is accurate as of 3/20/18  5:17 PM.  Always use your most recent med list.  
  
  
  
  
 aspirin delayed-release 81 mg tablet Take 81 mg by mouth daily. atorvastatin 40 mg tablet Commonly known as:  LIPITOR  
TAKE 1 TABLET DAILY CLARITIN 10 mg tablet Generic drug:  loratadine  
take 10 mg by mouth daily. Delsym 30 mg/5 mL liquid Generic drug:  dextromethorphan Take 60 mg by mouth two (2) times a day.   
  
 * diclofenac sodium 20 mg/gram /actuation(2 %) Sopm  
Commonly known as:  PENNSAID  
 1 Applicatorful by Apply Externally route four (4) times daily. Indications: OSTEOARTHROSIS OF THE KNEE * VOLTAREN 1 % Gel Generic drug:  diclofenac FISH OIL PO Take 1 Tab by mouth daily. 1,500 IU each capsule  
  
 fluticasone-salmeterol 100-50 mcg/dose diskus inhaler Commonly known as:  ADVAIR DISKUS Take 1 Puff by inhalation two (2) times a day. Rinse mouth with water after each use GLUCOSAMINE-CHONDROITIN PO Take  by mouth. 1 tabs twice a day  
  
 guaiFENesin-dextromethorphan -30 mg per tablet Commonly known as:  Reese Bevel DM Take 1 Tab by mouth every twelve (12) hours as needed for Cough. ketoconazole 2 % topical cream  
Commonly known as:  NIZORAL  
  
 melatonin 5 mg Cap capsule Take 1 Cap by mouth nightly. PRADAXA 150 mg capsule Generic drug:  dabigatran etexilate TAKE 1 CAPSULE EVERY 12 HOURS  
  
 PROBIOTIC 4X 10-15 mg Tbec Generic drug:  B.infantis-B.ani-B.long-B.bifi Take  by mouth. RAPAFLO 8 mg capsule Generic drug:  silodosin Take 8 mg by mouth daily (with breakfast). TOPROL XL 50 mg XL tablet Generic drug:  metoprolol succinate TAKE ONE AND ONE-HALF TABLETS DAILY  
  
 TYLENOL EXTRA STRENGTH 500 mg tablet Generic drug:  acetaminophen Take 2 tablets by mouth three (3) times daily as needed for Pain. VITAMIN D3 1,000 unit tablet Generic drug:  cholecalciferol Take  by mouth daily. * Notice: This list has 2 medication(s) that are the same as other medications prescribed for you. Read the directions carefully, and ask your doctor or other care provider to review them with you. We Performed the Following AMB POC RAPID INFLUENZA TEST [28603 CPT(R)] Follow-up Instructions Return if symptoms worsen or fail to improve. Patient Instructions Influenza (Flu): Care Instructions Your Care Instructions Influenza (flu) is an infection in the lungs and breathing passages. It is caused by the influenza virus. There are different strains, or types, of the flu virus from year to year. Unlike the common cold, the flu comes on suddenly and the symptoms, such as a cough, congestion, fever, chills, fatigue, aches, and pains, are more severe. These symptoms may last up to 10 days. Although the flu can make you feel very sick, it usually doesn't cause serious health problems. Home treatment is usually all you need for flu symptoms. But your doctor may prescribe antiviral medicine to prevent other health problems, such as pneumonia, from developing. Older people and those who have a long-term health condition, such as lung disease, are most at risk for having pneumonia or other health problems. Follow-up care is a key part of your treatment and safety. Be sure to make and go to all appointments, and call your doctor if you are having problems. It's also a good idea to know your test results and keep a list of the medicines you take. How can you care for yourself at home? · Get plenty of rest. 
· Drink plenty of fluids, enough so that your urine is light yellow or clear like water. If you have kidney, heart, or liver disease and have to limit fluids, talk with your doctor before you increase the amount of fluids you drink. · Take an over-the-counter pain medicine if needed, such as acetaminophen (Tylenol), ibuprofen (Advil, Motrin), or naproxen (Aleve), to relieve fever, headache, and muscle aches. Read and follow all instructions on the label. No one younger than 20 should take aspirin. It has been linked to Reye syndrome, a serious illness. · Do not smoke. Smoking can make the flu worse. If you need help quitting, talk to your doctor about stop-smoking programs and medicines. These can increase your chances of quitting for good.  
· Breathe moist air from a hot shower or from a sink filled with hot water to help clear a stuffy nose. · Before you use cough and cold medicines, check the label. These medicines may not be safe for young children or for people with certain health problems. · If the skin around your nose and lips becomes sore, put some petroleum jelly on the area. · To ease coughing: ¨ Drink fluids to soothe a scratchy throat. ¨ Suck on cough drops or plain hard candy. ¨ Take an over-the-counter cough medicine that contains dextromethorphan to help you get some sleep. Read and follow all instructions on the label. ¨ Raise your head at night with an extra pillow. This may help you rest if coughing keeps you awake. · Take any prescribed medicine exactly as directed. Call your doctor if you think you are having a problem with your medicine. To avoid spreading the flu · Wash your hands regularly, and keep your hands away from your face. · Stay home from school, work, and other public places until you are feeling better and your fever has been gone for at least 24 hours. The fever needs to have gone away on its own without the help of medicine. · Ask people living with you to talk to their doctors about preventing the flu. They may get antiviral medicine to keep from getting the flu from you. · To prevent the flu in the future, get a flu vaccine every fall. Encourage people living with you to get the vaccine. · Cover your mouth when you cough or sneeze. When should you call for help? Call 911 anytime you think you may need emergency care. For example, call if: 
? · You have severe trouble breathing. ?Call your doctor now or seek immediate medical care if: 
? · You have new or worse trouble breathing. ? · You seem to be getting much sicker. ? · You feel very sleepy or confused. ? · You have a new or higher fever. ? · You get a new rash. ? Watch closely for changes in your health, and be sure to contact your doctor if: 
? · You begin to get better and then get worse. ? · You are not getting better after 1 week. Where can you learn more? Go to http://roxana-vinny.info/. Enter D524 in the search box to learn more about \"Influenza (Flu): Care Instructions. \" Current as of: May 12, 2017 Content Version: 11.4 © 0025-1644 [employer]. Care instructions adapted under license by [employer] (which disclaims liability or warranty for this information). If you have questions about a medical condition or this instruction, always ask your healthcare professional. [address] any warranty or liability for your use of this information. Introducing [facility] [place] [facility]! Dear Rogers Anne: 
Thank you for requesting a Phigital account. Our records indicate that you already have an active Phigital account. You can access your account anytime at [email or web address] [employer]/E[employer] Technologies Did you know that you can access your hospital and ER discharge instructions at any time in Phigital? You can also review all of your test results from your hospital stay or ER visit. Additional Information If you have questions, please visit the Frequently Asked Questions section of the Phigital website at [email or web address] [email or web address]/. Remember, Phigital is NOT to be used for urgent needs. For medical emergencies, dial 911. Now available from your iPhone and Android! Please provide this summary of care documentation to your next provider. Your primary care clinician is listed as Katie Goodwin. If you have any questions after today's visit, please call [phone].

## 2018-03-27 ENCOUNTER — TELEPHONE (OUTPATIENT)
Dept: INTERNAL MEDICINE CLINIC | Age: 73
End: 2018-03-27

## 2018-03-27 DIAGNOSIS — R05.9 COUGH: Primary | ICD-10-CM

## 2018-03-27 RX ORDER — BENZONATATE 200 MG/1
200 CAPSULE ORAL
Qty: 20 CAP | Refills: 0 | Status: SHIPPED | OUTPATIENT
Start: 2018-03-27 | End: 2018-04-03

## 2018-04-10 ENCOUNTER — OFFICE VISIT (OUTPATIENT)
Dept: INTERNAL MEDICINE CLINIC | Age: 73
End: 2018-04-10

## 2018-04-10 ENCOUNTER — TELEPHONE (OUTPATIENT)
Dept: INTERNAL MEDICINE CLINIC | Age: 73
End: 2018-04-10

## 2018-04-10 VITALS
WEIGHT: 228.38 LBS | RESPIRATION RATE: 18 BRPM | BODY MASS INDEX: 29.31 KG/M2 | HEART RATE: 73 BPM | HEIGHT: 74 IN | SYSTOLIC BLOOD PRESSURE: 143 MMHG | DIASTOLIC BLOOD PRESSURE: 84 MMHG | TEMPERATURE: 97.5 F | OXYGEN SATURATION: 99 %

## 2018-04-10 DIAGNOSIS — J06.9 VIRAL UPPER RESPIRATORY TRACT INFECTION: Primary | ICD-10-CM

## 2018-04-10 RX ORDER — BENZONATATE 200 MG/1
200 CAPSULE ORAL
Qty: 20 CAP | Refills: 0 | Status: SHIPPED | OUTPATIENT
Start: 2018-04-10 | End: 2018-04-10 | Stop reason: SDUPTHER

## 2018-04-10 RX ORDER — METHYLPREDNISOLONE 4 MG/1
TABLET ORAL
Qty: 1 DOSE PACK | Refills: 0 | Status: SHIPPED | OUTPATIENT
Start: 2018-04-10 | End: 2018-04-10 | Stop reason: SDUPTHER

## 2018-04-10 RX ORDER — METHYLPREDNISOLONE 4 MG/1
TABLET ORAL
Qty: 1 DOSE PACK | Refills: 0 | Status: SHIPPED | OUTPATIENT
Start: 2018-04-10 | End: 2018-05-14

## 2018-04-10 RX ORDER — FLUTICASONE PROPIONATE AND SALMETEROL 100; 50 UG/1; UG/1
1 POWDER RESPIRATORY (INHALATION) 2 TIMES DAILY
Qty: 1 EACH | Refills: 0
Start: 2018-04-10 | End: 2018-05-14

## 2018-04-10 RX ORDER — BENZONATATE 200 MG/1
200 CAPSULE ORAL
Qty: 20 CAP | Refills: 0 | Status: SHIPPED | OUTPATIENT
Start: 2018-04-10 | End: 2018-04-17

## 2018-04-10 NOTE — TELEPHONE ENCOUNTER
Pt has been seen twice and still has the same symptoms. He would like to talk with the nurse.   Call pt at [phone]

## 2018-04-10 NOTE — TELEPHONE ENCOUNTER
Patient complains of on going productive cough, headache, sore throat, sinus pressure and drainage. He was getting better but now seems to be worse.

## 2018-04-10 NOTE — PROGRESS NOTES
HISTORY OF PRESENT ILLNESS  Piper Perry is a 74 y.o. male. HPI  Upper respiratory illness:  Piper Perry presents with complaints of recurring congestion, swollen glands, post nasal drip, productive cough, headache and clear nasal discharge for 5 days. no nausea and no vomiting . he has not had  sore throat, myalgias, fever and chills. Symptoms are moderate. Over-the-counter remedies including claritinm, mucinex  has been used with poor relief of symptoms. Drinking plenty of fluids: yes  Asthma?:  no  non-smoker  Contacts with similar infections: no   He had prior URI/ sinus infection which was almost resolved until last week. ROS    Physical Exam   Constitutional: He appears well-developed and well-nourished. No distress. /84 (BP 1 Location: Left arm, BP Patient Position: Sitting)  Pulse 73  Temp 97.5 °F (36.4 °C) (Oral)   Resp 18  Ht 6' 2\" (1.88 m)  Wt 228 lb 6 oz (103.6 kg)  SpO2 99%  BMI 29.32 kg/m2   HENT:   Head: Normocephalic and atraumatic. Right Ear: Tympanic membrane and ear canal normal. No decreased hearing is noted. Left Ear: Tympanic membrane and ear canal normal. No decreased hearing is noted. Nose: Mucosal edema and rhinorrhea present. No epistaxis. Right sinus exhibits no maxillary sinus tenderness and no frontal sinus tenderness. Left sinus exhibits no maxillary sinus tenderness and no frontal sinus tenderness. Mouth/Throat: Uvula is midline and mucous membranes are normal. No oral lesions. Normal dentition. Posterior oropharyngeal erythema present. No oropharyngeal exudate, posterior oropharyngeal edema or tonsillar abscesses. Eyes: Conjunctivae are normal. Pupils are equal, round, and reactive to light. Right eye exhibits no discharge. Left eye exhibits no discharge. No scleral icterus. Neck: Neck supple. Cardiovascular: Normal rate, regular rhythm and normal heart sounds.     Pulmonary/Chest: Effort normal and breath sounds normal. No accessory muscle usage. No respiratory distress. He has no decreased breath sounds (coarse BS throughout). He has no wheezes. He has no rhonchi. He has no rales. Lymphadenopathy:     He has no cervical adenopathy. Neurological: He is alert. Skin: Skin is warm and dry. He is not diaphoretic. Nursing note and vitals reviewed. ASSESSMENT and PLAN  Diagnoses and all orders for this visit:    1. Viral upper respiratory tract infection - this is different from his prior influenza infection. Shameka Moreno was diagnosed with a viral upper respiratory infection. he is advised to drink plenty of water, use shower steam or humidifier to loosen secretions, saline nasal lavage 3 times daily and get plenty of rest.  he may use mucinex 1200mg twice daily along with tylenol or advil as needed for fever and pain. Written instructions were given to the patient emphasizing these recommendations. he has trace wheeze / bronchitis but minimal production. Will add steroid taper. He has prior RX of advair which may also help. -     fluticasone-salmeterol (ADVAIR) 100-50 mcg/dose diskus inhaler; Take 1 Puff by inhalation two (2) times a day. -     methylPREDNISolone (MEDROL DOSEPACK) 4 mg tablet; As directed  -     benzonatate (TESSALON) 200 mg capsule; Take 1 Cap by mouth three (3) times daily as needed for Cough for up to 7 days.       Follow-up Disposition: Not on File

## 2018-04-10 NOTE — MR AVS SNAPSHOT
303 [facility] 
 
 
 [address] [place] [address] [facility] 
[phone] Patient: Faisal Celaya MRN: [id number] PMK:9/[birth year]93 Visit Information Date & Time Provider Department Dept. Phone Encounter #  
 4/10/2018  3:15 PM Franchesca Little MD [facility] [address] [id number] Your Appointments 5/1/2018  1:00 PM  
ESTABLISHED PATIENT with Marilyn Rodriguez MD  
[facility] ([facility]) Appt Note: 6 month follow up  
 [address] [place] 600 [address] [facility]  
[address] [place] [address] 5/14/2018 11:00 AM  
ESTABLISHED PATIENT with Kailash Sheehan MD  
[facility] ([facility]) Appt Note: 1 yr follow up  
 [address] [place] [address] [place] [address] [facility] [address]  
[phone]  
  
   
 [address] [facility] [address] [place] [address] [place] [address]  
  
    
 6/18/2018 11:30 AM  
PACEMAKER with PACEMAKER, STFRANCES  
[facility] (FREDDIE SCHEDULING) Appt Note: stj/pm/stf/merlin [address] [place] 600 1007 [facility]  
[address] [place] [address] [place] Upcoming Health Maintenance Date Due Influenza Age 5 to Adult 8/1/2017 MEDICARE YEARLY EXAM 5/23/2018 GLAUCOMA SCREENING Q2Y 5/22/2019 DTaP/Tdap/Td series (2 - Td) 1/11/2026 Allergies as of 4/10/2018  Review Complete On: 10/24/2017 By: Alfonso Leyva LPN Severity Noted Reaction Type Reactions Other Plant, Animal, Environmental High 07/24/2015   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 Amoxicillin  10/28/2011    Rash Clindamycin  02/11/2009    Hives, Rash Pcn [Penicillins]  02/11/2009    Rash States he is able to tolerate cephalexin with no adverse reaction Sulfa (Sulfonamide Antibiotics)  02/21/2011    Rash Venom-honey Bee  08/18/2015    Rash Deysi Yatess Current Immunizations  Reviewed on 5/5/2016 Name Date Influenza High Dose Vaccine PF 9/30/2016, 9/25/2015 Influenza Vaccine 9/22/2014 Influenza Vaccine (Whole Virus) 10/15/2012 Influenza Vaccine Split 10/24/2011 Pneumococcal Conjugate (PCV-13) 9/25/2015 Pneumococcal Vaccine (Unspecified Type) 10/15/2012 Tdap 1/11/2016 ZZZ-RETIRED (DO NOT USE) Pneumococcal Vaccine (Unspecified Type) 10/24/2011 Zoster Vaccine, Live 7/28/2012 Not reviewed this visit You Were Diagnosed With   
  
 Codes Comments Viral upper respiratory tract infection    -  Primary ICD-10-CM: J06.9 ICD-9-CM: 465.9 Vitals BP Pulse Temp Resp Height(growth percentile) Weight(growth percentile) 143/84 (BP 1 Location: Left arm, BP Patient Position: Sitting) 73 97.5 °F (36.4 °C) (Oral) 18 6' 2\" (1.88 m) 228 lb 6 oz (103.6 kg) SpO2 BMI Smoking Status 99% 29.32 kg/m2 Never Smoker Vitals History BMI and BSA Data Body Mass Index Body Surface Area  
 29.32 kg/m 2 2.33 m 2 Preferred Pharmacy Pharmacy Name Phone [facility] [phone] Your Updated Medication List  
  
   
This list is accurate as of 4/10/18  3:51 PM.  Always use your most recent med list.  
  
  
  
  
 aspirin delayed-release 81 mg tablet Take 81 mg by mouth daily. atorvastatin 40 mg tablet Commonly known as:  LIPITOR  
TAKE 1 TABLET DAILY  
  
 benzonatate 200 mg capsule Commonly known as:  TESSALON Take 1 Cap by mouth three (3) times daily as needed for Cough for up to 7 days. CLARITIN 10 mg tablet Generic drug:  loratadine  
take 10 mg by mouth daily. Delsym 30 mg/5 mL liquid Generic drug:  dextromethorphan Take 60 mg by mouth two (2) times a day. * diclofenac sodium 20 mg/gram /actuation(2 %) Sopm  
Commonly known as:  PENNSAID  
1 Applicatorful by Apply Externally route four (4) times daily. Indications: OSTEOARTHROSIS OF THE KNEE * VOLTAREN 1 % Gel Generic drug:  diclofenac FISH OIL PO Take 1 Tab by mouth daily. 1,500 IU each capsule  
  
 fluticasone-salmeterol 100-50 mcg/dose diskus inhaler Commonly known as:  ADVAIR Take 1 Puff by inhalation two (2) times a day. GLUCOSAMINE-CHONDROITIN PO Take  by mouth. 1 tabs twice a day  
  
 guaiFENesin-dextromethorphan -30 mg per tablet Commonly known as:  Ferny & Ferny DM Take 1 Tab by mouth every twelve (12) hours as needed for Cough. ketoconazole 2 % topical cream  
Commonly known as:  NIZORAL  
  
 melatonin 5 mg Cap capsule Take 1 Cap by mouth nightly. methylPREDNISolone 4 mg tablet Commonly known as:  Tate Arrieta As directed PRADAXA 150 mg capsule Generic drug:  dabigatran etexilate TAKE 1 CAPSULE EVERY 12 HOURS  
  
 PROBIOTIC 4X 10-15 mg Tbec Generic drug:  B.infantis-B.ani-B.long-B.bifi Take  by mouth. RAPAFLO 8 mg capsule Generic drug:  silodosin Take 8 mg by mouth daily (with breakfast). TOPROL XL 50 mg XL tablet Generic drug:  metoprolol succinate TAKE ONE AND ONE-HALF TABLETS DAILY  
  
 TYLENOL EXTRA STRENGTH 500 mg tablet Generic drug:  acetaminophen Take 2 tablets by mouth three (3) times daily as needed for Pain. VITAMIN D3 1,000 unit tablet Generic drug:  cholecalciferol Take  by mouth daily. * Notice: This list has 2 medication(s) that are the same as other medications prescribed for you. Read the directions carefully, and ask your doctor or other care provider to review them with you. Prescriptions Sent to Pharmacy Refills  
 methylPREDNISolone (MEDROL DOSEPACK) 4 mg tablet 0 Sig: As directed  Class: Normal  
 Pharmacy: [address] [facility] Ph #: [phone]  
 benzonatate (TESSALON) 200 mg capsule 0 Sig: Take 1 Cap by mouth three (3) times daily as needed for Cough for up to 7 days. Class: Normal  
 Pharmacy: [facility], [address] [place] [address] Ph #: [phone] Route: Oral  
  
Introducing [facility] [place] [facility] SERVICES! Dear Anais Villanueva: 
Thank you for requesting a Material Mix account. Our records indicate that you already have an active Material Mix account. You can access your account anytime at [email or web address] Did you know that you can access your hospital and ER discharge instructions at any time in Material Mix? You can also review all of your test results from your hospital stay or ER visit. Additional Information If you have questions, please visit the Frequently Asked Questions section of the Material Mix website at [email or web address] Remember, Material Mix is NOT to be used for urgent needs. For medical emergencies, dial 911. Now available from your iPhone and Android! Please provide this summary of care documentation to your next provider. Your primary care clinician is listed as Lito Burton. If you have any questions after today's visit, please call [phone].

## 2018-04-16 ENCOUNTER — HOSPITAL ENCOUNTER (OUTPATIENT)
Dept: LAB | Age: 73
Discharge: HOME | End: 2018-04-16
Payer: MEDICARE

## 2018-04-16 PROCEDURE — 83704 LIPOPROTEIN BLD QUAN PART: CPT

## 2018-04-16 PROCEDURE — 80053 COMPREHEN METABOLIC PANEL: CPT

## 2018-04-16 PROCEDURE — 36415 COLL VENOUS BLD VENIPUNCTURE: CPT

## 2018-04-18 LAB
ALBUMIN SERPL-MCNC: 3.8 G/DL (ref 3.5–4.8)
ALBUMIN/GLOB SERPL: 1.4 {RATIO} (ref 1.2–2.2)
ALP SERPL-CCNC: 115 IU/L (ref 39–117)
ALT SERPL-CCNC: 20 IU/L (ref 0–44)
AST SERPL-CCNC: 23 IU/L (ref 0–40)
BILIRUB SERPL-MCNC: 1.2 MG/DL (ref 0–1.2)
BUN SERPL-MCNC: 23 MG/DL (ref 8–27)
BUN/CREAT SERPL: 23 (ref 10–24)
CALCIUM SERPL-MCNC: 9 MG/DL (ref 8.6–10.2)
CHLORIDE SERPL-SCNC: 100 MMOL/L (ref 96–106)
CHOLEST SERPL-MCNC: 134 MG/DL (ref 100–199)
CO2 SERPL-SCNC: 25 MMOL/L (ref 18–29)
CREAT SERPL-MCNC: 0.98 MG/DL (ref 0.76–1.27)
GFR SERPLBLD CREATININE-BSD FMLA CKD-EPI: 76 ML/MIN/1.73
GFR SERPLBLD CREATININE-BSD FMLA CKD-EPI: 87 ML/MIN/1.73
GLOBULIN SER CALC-MCNC: 2.8 G/DL (ref 1.5–4.5)
GLUCOSE SERPL-MCNC: 87 MG/DL (ref 65–99)
HDL SERPL-SCNC: 31.8 UMOL/L
HDLC SERPL-MCNC: 57 MG/DL
INTERPRETATION, 910389: NORMAL
LDL SERPL QN: 21.2 NM
LDL SERPL-SCNC: 549 NMOL/L
LDL SMALL SERPL-SCNC: 133 NMOL/L
LDLC SERPL CALC-MCNC: 56 MG/DL (ref 0–99)
LP-IR SCORE SERPL: 29
POTASSIUM SERPL-SCNC: 4.3 MMOL/L (ref 3.5–5.2)
PROT SERPL-MCNC: 6.6 G/DL (ref 6–8.5)
SODIUM SERPL-SCNC: 143 MMOL/L (ref 134–144)
TRIGL SERPL-MCNC: 104 MG/DL (ref 0–149)

## 2018-05-14 ENCOUNTER — OFFICE VISIT (OUTPATIENT)
Dept: CARDIOLOGY CLINIC | Age: 73
End: 2018-05-14

## 2018-05-14 VITALS
HEART RATE: 64 BPM | WEIGHT: 224 LBS | OXYGEN SATURATION: 97 % | DIASTOLIC BLOOD PRESSURE: 70 MMHG | HEIGHT: 74 IN | BODY MASS INDEX: 28.75 KG/M2 | SYSTOLIC BLOOD PRESSURE: 108 MMHG | RESPIRATION RATE: 18 BRPM

## 2018-05-14 DIAGNOSIS — I48.20 CHRONIC ATRIAL FIBRILLATION (HCC): Primary | ICD-10-CM

## 2018-05-14 NOTE — PROGRESS NOTES
HISTORY OF PRESENTING ILLNESS      Selene Osorio is a 74 y.o. male with dual-chamber pacemaker, tachycardia/bradycardia syndrome, GERD, peripheral vascular disease, dyslipidemia, CVA, atrial fibrillation and BPH presenting for follow up of his pacemaker. Last visit, we adjusted his rate response in an attempt to improve his fatigue. Denies recurrent fatigue. Denies syncope, chest pain, SOB. Able to participate in water aerobics and bowling without issue. ACTIVE PROBLEM LIST     Patient Active Problem List    Diagnosis Date Noted    Pacemaker 12/28/2016    Gastroesophageal reflux disease without esophagitis 08/05/2016    Advanced care planning/counseling discussion 05/05/2016    Bilateral carotid artery disease ([address] 11/26/2014    Dyslipidemia 06/05/2014    Elevated Lp(a) 06/05/2014    Cerebral infarction ([address] 03/15/2014    James-tachy syndrome ([address] 03/27/2012    Chronic maxillary sinusitis 12/08/2011    Family history of prostate cancer 09/16/2011    Scoliosis 04/26/2011    Atrial fibrillation ([address] 03/22/2010    BPH (benign prostatic hyperplasia) 01/27/2010    OA (osteoarthritis) of knee 02/11/2009    Skin moles 02/11/2009    Colon polyp 02/11/2009    AR (allergic rhinitis) 02/11/2009    Thyroid nodule 02/11/2009           PAST MEDICAL HISTORY     Past Medical History:   Diagnosis Date    AR (allergic rhinitis) 2/11/2009    Atrial fibrillation ([address]     onset 2003, now chronic, CHADS2 1-2.     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 Janine Vicente  06/2017    Right Eye laser treatment     HX VASECTOMY  25-30 yrs ago    STRESS TEST CARDIOLITE  4/2008    11 min., normal perfusion, EF 57%    VAS CAROTID DUPLEX BILATERAL  8/15/11    10-49% bilateral, unchanged from one year prior          ALLERGIES     Allergies   Allergen Reactions    Other Plant, Animal, Environmental Anaphylaxis     Entire body edema with fire ants    Amoxicillin Rash    Clindamycin Hives and Rash    Pcn [Penicillins] Rash     States he is able to tolerate cephalexin with no adverse reaction    Sulfa (Sulfonamide Antibiotics) Rash    Venom-Honey Bee Rash     Yellowjackets            FAMILY HISTORY     Family History   Problem Relation Age of Onset    Stroke Mother     Cancer Father      prostate    Hypertension Father     Diabetes Brother     Cancer Brother      prostate    Diabetes Brother     Hypertension Brother     Cancer Brother      melanoma    Cancer Daughter      melanoma    negative for cardiac disease       SOCIAL HISTORY     Social History     Social History    Marital status:      Spouse name: N/A    Number of children: N/A    Years of education: N/A     Social History Main Topics    Smoking status: Never Smoker    Smokeless tobacco: Never Used    Alcohol use No Comment: no     Drug use: No    Sexual activity: Not Currently     Other Topics Concern    Not on file     Social History Narrative         MEDICATIONS     Current Outpatient Prescriptions   Medication Sig    fluticasone-salmeterol (ADVAIR) 100-50 mcg/dose diskus inhaler Take 1 Puff by inhalation two (2) times a day.  methylPREDNISolone (MEDROL DOSEPACK) 4 mg tablet As directed    dextromethorphan (DELSYM) 30 mg/5 mL liquid Take 60 mg by mouth two (2) times a day.  PRADAXA 150 mg capsule TAKE 1 CAPSULE EVERY 12 HOURS    TOPROL XL 50 mg XL tablet TAKE ONE AND ONE-HALF TABLETS DAILY    guaiFENesin-dextromethorphan SR (MUCINEX DM) 600-30 mg per tablet Take 1 Tab by mouth every twelve (12) hours as needed for Cough.  atorvastatin (LIPITOR) 40 mg tablet TAKE 1 TABLET DAILY    VOLTAREN 1 % gel     B.infantis-B.ani-B.long-B.bifi (PROBIOTIC 4X) 10-15 mg TbEC Take  by mouth.  ketoconazole (NIZORAL) 2 % topical cream     melatonin 5 mg cap capsule Take 1 Cap by mouth nightly.  diclofenac sodium (PENNSAID) 20 mg/gram /actuation(2 %) sopm 1 Applicatorful by Apply Externally route four (4) times daily. Indications: OSTEOARTHROSIS OF THE KNEE    acetaminophen (TYLENOL EXTRA STRENGTH) 500 mg tablet Take 2 tablets by mouth three (3) times daily as needed for Pain.  silodosin (RAPAFLO) 8 mg capsule Take 8 mg by mouth daily (with breakfast).  GLUCOSAMINE HCL/CHONDRO DONALDSON A (GLUCOSAMINE-CHONDROITIN PO) Take  by mouth. 1 tabs twice a day    aspirin delayed-release 81 mg tablet Take 81 mg by mouth daily.  cholecalciferol, vitamin d3, (VITAMIN D) 1,000 unit tablet Take  by mouth daily.  CLARITIN 10 mg Tab take 10 mg by mouth daily.  FISH OIL PO Take 1 Tab by mouth daily. 1,500 IU each capsule     No current facility-administered medications for this visit. I have reviewed the nurses notes, vitals, problem list, allergy list, medical history, family, social history and medications. REVIEW OF SYMPTOMS      General: Pt denies excessive weight gain or loss. Pt is able to conduct ADL's  HEENT: Denies blurred vision, headaches, hearing loss, epistaxis and difficulty swallowing. Respiratory: Denies cough, congestion, shortness of breath, CONTRERAS, wheezing or stridor. Cardiovascular: Denies precordial pain, palpitations, edema or PND  Gastrointestinal: Denies poor appetite, indigestion, abdominal pain or blood in stool  Genitourinary: Denies hematuria, dysuria, increased urinary frequency  Musculoskeletal: Denies joint pain or swelling from muscles or joints  Neurologic: Denies tremor, paresthesias, headache, or sensory motor disturbance  Psychiatric: Denies confusion, insomnia, depression  Integumentray: Denies rash, itching or ulcers. Hematologic: Denies easy bruising, bleeding       PHYSICAL EXAMINATION      There were no vitals filed for this visit. General: Well developed, in no acute distress. HEENT: No jaundice, oral mucosa moist, no oral ulcers  Neck: Supple, no stiffness, no lymphadenopathy, supple  Heart:  Normal S1/S2 negative S3 or S4. Regular, no murmur, gallop or rub, no jugular venous distention  Respiratory: Clear bilaterally x 4, no wheezing or rales  Abdomen:   Soft, non-tender, bowel sounds are active.   Extremities:  No edema, normal cap refill, no cyanosis. Musculoskeletal: No clubbing, no deformities  Neuro: A&Ox3, speech clear, gait stable, cooperative, no focal neurologic deficits  Skin: Skin color is normal. No rashes or lesions.  Non diaphoretic, moist.  Vascular: 2+ pulses symmetric in all extremities       DIAGNOSTIC DATA      EKG:        LABORATORY DATA      Lab Results   Component Value Date/Time    WBC 5.6 03/20/2017 08:20 AM    HGB 16.4 03/20/2017 08:20 AM    HCT 48.5 03/20/2017 08:20 AM    PLATELET 604 (L) 98/01/9983 08:20 AM    MCV 89 03/20/2017 08:20 AM      Lab Results   Component Value Date/Time    Sodium 143 04/16/2018 10:13 AM    Potassium 4.3 04/16/2018 10:13 AM    Chloride 100 04/16/2018 10:13 AM    CO2 25 04/16/2018 10:13 AM    Anion gap 9 11/30/2015 10:00 AM    Glucose 87 04/16/2018 10:13 AM    BUN 23 04/16/2018 10:13 AM    Creatinine 0.98 04/16/2018 10:13 AM    BUN/Creatinine ratio 23 04/16/2018 10:13 AM    GFR est AA 87 04/16/2018 10:13 AM    GFR est non-AA 76 04/16/2018 10:13 AM    Calcium 9.0 04/16/2018 10:13 AM    Bilirubin, total 1.2 04/16/2018 10:13 AM    AST (SGOT) 23 04/16/2018 10:13 AM    Alk. phosphatase 115 04/16/2018 10:13 AM    Protein, total 6.6 04/16/2018 10:13 AM    Albumin 3.8 04/16/2018 10:13 AM    Globulin 2.9 05/04/2010 10:00 AM    A-G Ratio 1.4 04/16/2018 10:13 AM    ALT (SGPT) 20 04/16/2018 10:13 AM           ASSESSMENT      1. Pacemaker  2. Atrial fibrillation  3. Dyslipidemia  4. BPH  5. Peripheral vascular disease       PLAN     Continue follow-up in device clinic. Continue current drug regimen. FOLLOW-UP     1 year      Thank you, Katherine Hair MD and Dr. Letitia Lancaster for allowing me to participate in the care of this extraordinarily pleasant male. Please do not hesitate to contact me for further questions/concerns.          Jose Bartlett MD  Cardiac Electrophysiology / Cardiology    [facility]  [address] [facility], [place] [address]  Josefina Corbin [address] [place], [place] [address]  [phone] / [phone] Fax   [phone] / [phone] Fax

## 2018-05-14 NOTE — PROGRESS NOTES
Visit Vitals    /70    Pulse 64    Resp 18    Ht 6' 2\" (1.88 m)    Wt 224 lb (101.6 kg)    SpO2 97%    BMI 28.76 kg/m2

## 2018-05-21 ENCOUNTER — TELEPHONE (OUTPATIENT)
Dept: CARDIOLOGY CLINIC | Age: 73
End: 2018-05-21

## 2018-06-04 ENCOUNTER — OFFICE VISIT (OUTPATIENT)
Dept: CARDIOLOGY CLINIC | Age: 73
End: 2018-06-04

## 2018-06-04 VITALS
HEART RATE: 64 BPM | OXYGEN SATURATION: 95 % | BODY MASS INDEX: 28.67 KG/M2 | SYSTOLIC BLOOD PRESSURE: 118 MMHG | DIASTOLIC BLOOD PRESSURE: 82 MMHG | WEIGHT: 223.4 LBS | HEIGHT: 74 IN | RESPIRATION RATE: 16 BRPM

## 2018-06-04 DIAGNOSIS — I63.9 CEREBRAL INFARCTION, UNSPECIFIED MECHANISM (HCC): ICD-10-CM

## 2018-06-04 DIAGNOSIS — I48.20 CHRONIC ATRIAL FIBRILLATION (HCC): Primary | ICD-10-CM

## 2018-06-04 DIAGNOSIS — E78.5 DYSLIPIDEMIA: ICD-10-CM

## 2018-06-04 DIAGNOSIS — Z95.0 CARDIAC PACEMAKER IN SITU: ICD-10-CM

## 2018-06-04 NOTE — PROGRESS NOTES
Visit Vitals    /82 (BP 1 Location: Left arm, BP Patient Position: Sitting)    Pulse 64    Resp 16    Ht 6' 2\" (1.88 m)    Wt 223 lb 6.4 oz (101.3 kg)    SpO2 95%    BMI 28.68 kg/m2

## 2018-06-04 NOTE — PROGRESS NOTES
HISTORY OF PRESENT ILLNESS  Zandra Mobley is a 74 y.o. male. Last seen 6 months ago. Problem List  Date Reviewed: 5/14/2018          Codes Class Noted    Pacemaker ICD-10-CM: Z95.0  ICD-9-CM: V45.01  12/28/2016        Gastroesophageal reflux disease without esophagitis ICD-10-CM: K21.9  ICD-9-CM: 530.81  8/5/2016        Advanced care planning/counseling discussion ICD-10-CM: Z71.89  ICD-9-CM: V65.49  5/5/2016    Overview Signed 5/5/2016  3:38 PM by Melisa Willingham RN     Patient states that a completed AMD is at home. NN encouraged patient to bring a copy to the office for scanning into the medical record. Patient verbalized understanding.                Bilateral carotid artery disease ([facility] ICD-10-CM: I77.9  ICD-9-CM: 447.9  11/26/2014        Dyslipidemia ICD-10-CM: E78.5  ICD-9-CM: 272.4  6/5/2014        Elevated Lp(a) ICD-10-CM: P18.88  ICD-9-CM: 272.8  6/5/2014        Cerebral infarction [facility]) ICD-10-CM: I63.9  ICD-9-CM: 434.91  3/15/2014        James-tachy syndrome ([facility] ICD-10-CM: I49.5  ICD-9-CM: 427.81  3/27/2012        Chronic maxillary sinusitis ICD-10-CM: J32.0  ICD-9-CM: 473.0  12/8/2011    Overview Signed 12/8/2011  3:23 PM by Linh Marks MD     Left chronic maxillary sinusitis             Family history of prostate cancer ICD-10-CM: Z80.42  ICD-9-CM: V16.42  9/16/2011        Scoliosis ICD-10-CM: M41.9  ICD-9-CM: 737.30  4/26/2011        Atrial fibrillation ([facility] ICD-10-CM: I48.91  ICD-9-CM: 427.31  3/22/2010        BPH (benign prostatic hyperplasia) ICD-10-CM: N40.0  ICD-9-CM: 600.00  1/27/2010        OA (osteoarthritis) of knee ICD-10-CM: M17.10  ICD-9-CM: 715.36  2/11/2009        Skin moles ICD-10-CM: D22.9  ICD-9-CM: 216.9  2/11/2009        Colon polyp ICD-10-CM: K63.5  ICD-9-CM: 211.3  2/11/2009        AR (allergic rhinitis) ICD-10-CM: J30.9  ICD-9-CM: 477.9  2/11/2009        Thyroid nodule ICD-10-CM: E04.1  ICD-9-CM: 241.0  2/11/2009               Past Cardiac History [facility], [facility]) :     Atrial fibrillation  -  dx 2003  - rythm management until 2008  -No structural or ischemic heart disease  Sinus node dysfunction s/p dual chamber pacer 8/09 PATIENTS [facility])  Orthostatic hypotension  Carotid disease, mild to moderate       Cardiac testing  ETT April 2012 - 8 min, resting HR 81, peak   Carotid duplex 8/17/2012 - 10-49% bilateral  Echo 3/4/13: EF 55-60%; LA: moderately dilated; MV and TV: mild regurg.; no sig. hange from previous echo  3/2013 - abnormal overnight oximetry, normal sleep study  KACEY 4/17/2014 - no intracardiac mass/thrombus or shunt, mild to moderate aortic arch plaque, EF 50%, moderate LAE, mild MR/AR. Carotid duplex 4/15/14 - 10-49% plaque bilateral, unchanged  Carotid duplex 6/8/15: 10-49% bilateral stenosis; unchanged. Echo 12/21/15 - EF 65 %. No WMA. Moderate WYATT. Mild TR. Mild pulmonary HTN with PASP 38mmHg. Carotid duplex 6/24/16 - 10-49% bilaterally, unchanged    3/28/17- [facility] pacemaker generator change per Dr. Florence Kawasaki    Carotid duplex 4/18/17 - 10-49% bilaterally, unchanged. HPI  Mr. Melissa Spencer has been experiencing some chest wall soreness after straining his chest wall muscles when he was swimming in a pool with webbed gloves. He denies any SOB, chest pain, palpitations, or tachycardia. He denies any bleeding tendencies on Pradexa. Pt's younger brother passed away recently, and his older brother as well as one of his male cousins have a history of vascular problem in his legs. Cardiac risk factors   HTN no  DM no  Smoking no      Current Outpatient Prescriptions   Medication Sig Dispense Refill    KRILL OIL PO Take 1 Cap by mouth daily.  dextromethorphan (DELSYM) 30 mg/5 mL liquid Take 60 mg by mouth two (2) times daily as needed.       PRADAXA 150 mg capsule TAKE 1 CAPSULE EVERY 12 HOURS 180 Cap 3    TOPROL XL 50 mg XL tablet TAKE ONE AND ONE-HALF TABLETS DAILY 135 Tab 3    guaiFENesin-dextromethorphan SR (MUCINEX DM) 600-30 mg per tablet Take 1 Tab by mouth every twelve (12) hours as needed for Cough. 30 Tab 0    atorvastatin (LIPITOR) 40 mg tablet TAKE 1 TABLET DAILY 90 Tab 5    VOLTAREN 1 % gel as needed.  B.infantis-B.ani-B.long-B.bifi (PROBIOTIC 4X) 10-15 mg TbEC Take 1 Cap by mouth daily.  ketoconazole (NIZORAL) 2 % topical cream daily as needed.  melatonin 5 mg cap capsule Take 5 mg by mouth nightly as needed.  diclofenac sodium (PENNSAID) 20 mg/gram /actuation(2 %) sopm 1 Applicatorful by Apply Externally route four (4) times daily. Indications: OSTEOARTHROSIS OF THE KNEE (Patient taking differently: 1 Applicatorful by Apply Externally route four (4) times daily as needed. Indications: OSTEOARTHRITIS OF THE KNEE) 1 Bottle 4    acetaminophen (TYLENOL EXTRA STRENGTH) 500 mg tablet Take 2 tablets by mouth three (3) times daily as needed for Pain.  silodosin (RAPAFLO) 8 mg capsule Take 8 mg by mouth daily (with breakfast).  GLUCOSAMINE HCL/CHONDRO DONALDSON A (GLUCOSAMINE-CHONDROITIN PO) Take  by mouth daily. 1 tabs twice a day      aspirin delayed-release 81 mg tablet Take 81 mg by mouth daily.  cholecalciferol, vitamin d3, (VITAMIN D) 1,000 unit tablet Take  by mouth daily.  CLARITIN 10 mg Tab take 10 mg by mouth daily. Allergies   Allergen Reactions    Other Plant, Animal, Environmental Anaphylaxis     Entire body edema with fire ants    Amoxicillin Rash    Clindamycin Hives and Rash    Pcn [Penicillins] Rash     States he is able to tolerate cephalexin with no adverse reaction    Sulfa (Sulfonamide Antibiotics) Rash    Venom-Honey Bee Rash     Yellowjackets       Review of Systems   Constitutional: Negative for fever, chills, weight loss, and diaphoresis. Respiratory: Negative for cough, hemoptysis, sputum production, shortness of breath and wheezing. Cardiovascular: Negative for chest pain, palpitations, orthopnea, claudication, leg swelling and PND.    Gastrointestinal: Negative for heartburn, nausea, vomiting, blood in stool and melena. Genitourinary: Negative for dysuria, hematuria and flank pain. Neurological: Negative for speech change, focal weakness, seizures, loss of consciousness, weakness and headaches. Endo/Heme/Allergies: Does not bruise/bleed easily. Psychiatric/Behavioral: Negative for memory loss. The patient does not have insomnia. Musculoskeletal: +chest wall pain     Visit Vitals    /82 (BP 1 Location: Left arm, BP Patient Position: Sitting)    Pulse 64    Resp 16    Ht 6' 2\" (1.88 m)    Wt 223 lb 6.4 oz (101.3 kg)    SpO2 95%    BMI 28.68 kg/m2        Wt Readings from Last 3 Encounters:   06/04/18 223 lb 6.4 oz (101.3 kg)   05/14/18 224 lb (101.6 kg)   04/10/18 228 lb 6 oz (103.6 kg)     Physical Exam   Vitals reviewed. Constitutional: He is oriented to person, place, and time. He appears well-developed. Head: Normocephalic. Neck: Neck supple. No JVD present. No tracheal deviation present. Cardiovascular: Irregular rhythm and normal heart sounds. Exam reveals no gallop and no friction rub. No murmur heard. Pulmonary/Chest: Effort normal and breath sounds normal. He has no wheezes. He has no rales. Abdominal: Soft. Bowel sounds are normal. No tenderness. Musculoskeletal: He exhibits no edema. Neurological: He is alert and oriented to person, place, and time. Skin: Skin is warm and dry. Psychiatric: He has a normal mood and affect.      Results for orders placed or performed in visit on 10/24/17   NMR LIPOPROFILE     Status: None   Result Value Ref Range Status    LDL-P 549 <1000 nmol/L Final     Comment:                           Low                   < 1000                            Moderate         1000 - 1299                            Borderline-High  1300 - 1599                            High             1600 - 2000                            Very High             > 2000      LDL-C 56 0 - 99 mg/dL Final     Comment: Optimal               <  100                            Above optimal     100 -  129                            Borderline        130 -  159                            High              160 -  189                            Very high             >  189  LDL-C is inaccurate if patient is non-fasting. HDL-C 57 >39 mg/dL Final    Triglycerides 104 0 - 149 mg/dL Final    Cholesterol, Total 134 100 - 199 mg/dL Final    HDL-P (Total) 31.8 >=30.5 umol/L Final    Small LDL-P 133 <=527 nmol/L Final    LDL size 21.2 >20.5 nm Final     Comment:  ----------------------------------------------------------                   ** INTERPRETATIVE INFORMATION**                   PARTICLE CONCENTRATION AND SIZE                      <--Lower CVD Risk   Higher CVD Risk-->    LDL AND HDL PARTICLES   Percentile in Reference Population    HDL-P (total)        High     75th    50th    25th   Low                         >34.9    34.9    30.5    26.7   <26.7    Small LDL-P          Low      25th    50th    75th   High                         <117     117     527     839    >839    LDL Size   <-Large (Pattern A)->    <-Small (Pattern B)->                      23.0    20.6           20.5      19.0   ----------------------------------------------------------  Small LDL-P and LDL Size are associated with CVD risk, but not after  LDL-P is taken into account. These assays were developed and their performance characteristics  determined by [employer]. These assays have not been cleared by the  [employer] and Drug Administration. The clinical utility o  f these  laboratory values have not been fully established.       LP-IR SCORE 29 <=45 Final     Comment: INSULIN RESISTANCE MARKER      <--Insulin Sensitive    Insulin Resistant-->             Percentile in Reference Population  Insulin Resistance Score  LP-IR Score   Low   25th   50th   75th   High                <27   27     45     63     >63  LP-IR Score is inaccurate if patient is non-fasting. The LP-IR score is a laboratory developed index that has been  associated with insulin resistance and diabetes risk and should be  used as one component of a physician's clinical assessment. The  LP-IR score listed above has not been cleared by the [employer] and  Drug Administration. Narrative    Performed at:  [address]  [id number]  : Noah Penaloza MD, Phone:  [phone]     Lab Results   Component Value Date/Time    Sodium 143 04/16/2018 10:13 AM    Potassium 4.3 04/16/2018 10:13 AM    Chloride 100 04/16/2018 10:13 AM    CO2 25 04/16/2018 10:13 AM    Anion gap 9 11/30/2015 10:00 AM    Glucose 87 04/16/2018 10:13 AM    BUN 23 04/16/2018 10:13 AM    Creatinine 0.98 04/16/2018 10:13 AM    BUN/Creatinine ratio 23 04/16/2018 10:13 AM    GFR est AA 87 04/16/2018 10:13 AM    GFR est non-AA 76 04/16/2018 10:13 AM    Calcium 9.0 04/16/2018 10:13 AM    Bilirubin, total 1.2 04/16/2018 10:13 AM    AST (SGOT) 23 04/16/2018 10:13 AM    Alk. phosphatase 115 04/16/2018 10:13 AM    Protein, total 6.6 04/16/2018 10:13 AM    Albumin 3.8 04/16/2018 10:13 AM    Globulin 2.9 05/04/2010 10:00 AM    A-G Ratio 1.4 04/16/2018 10:13 AM    ALT (SGPT) 20 04/16/2018 10:13 AM     Cardiographics  Pacer function 3/06/14 - normal  EKG 3/06/14 - Ventricular paced, underlying AF  EKG 12/14/15 - V paced, underlying AF  EKG 6/24/16 - V paced, underlying AF  Carotid duplex 6/24/16 - 10-49% bilaterally, unchanged    ASSESSMENT and PLAN  Encounter Diagnoses   Name Primary?  Chronic atrial fibrillation (HCC) Yes    Cardiac pacemaker in situ     Dyslipidemia     Cerebral infarction, unspecified mechanism [facility])       Mr. Antoinette Sanchez has permanent atrial fibrillation complicated by conduction disease s/p dual chamber PM in 2009 and generator change 1 year ago. No bleeding issues on Pradaxa. Recent chest pain is consistent with musculoskeletal etiology.     Dyslipidemia optimized lipids and lipoproteins on current treatment. FPS remains normal. Recheck in 1 year. Mild carotid disease by duplex study 6 months ago, unchanged from 2012. Repeat duplex in 2 years.      Follow-up Disposition: Not on File   With fasting labs 2 weeks prior    Written by Shantelle Crawford, dictated by Gurpreet Dyer MD.  Gurpreet Dyer MD

## 2018-06-04 NOTE — MR AVS SNAPSHOT
[address] [facility] [address] 
[phone] Patient: Syed Whitmore MRN: [id number] Visit Information Date & Time Provider Department Dept. Phone Encounter #  
 6/4/2018 10:20 AM Carmenza Mahoney MD [facility] Randi Krishnan [phone] [id number] Follow-up Instructions Return in about 6 months (around 12/4/2018). Your Appointments 6/18/2018 11:30 AM  
PACEMAKER with PACEMAKER, CHAYO  
[facility] (FREDDIE SCHEDULING) Appt Note: stj/pm/stf/merlin 354 [facility] [address]  
54 [facility] [address] [address] Upcoming Health Maintenance Date Due  
 MEDICARE YEARLY EXAM 5/23/2018 Influenza Age 5 to Adult 8/1/2018 GLAUCOMA SCREENING Q2Y 5/22/2019 DTaP/Tdap/Td series (2 - Td) 1/11/2026 Allergies as of 6/4/2018  Review Complete On: 6/4/2018 By: Merced Tellez LPN Severity Noted Reaction Type Reactions Other Plant, Animal, Environmental High 07/24/2015   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 Amoxicillin  10/28/2011    Rash Clindamycin  02/11/2009    Hives, Rash Pcn [Penicillins]  02/11/2009    Rash States he is able to tolerate cephalexin with no adverse reaction Sulfa (Sulfonamide Antibiotics)  02/21/2011    Rash Venom-honey Bee  08/18/2015    Rash Nova Gave Current Immunizations  Reviewed on 5/5/2016 Name Date Influenza High Dose Vaccine PF 9/30/2016, 9/25/2015 Influenza Vaccine 9/22/2014 Influenza Vaccine (Whole Virus) 10/15/2012 Influenza Vaccine Split 10/24/2011 Pneumococcal Conjugate (PCV-13) 9/25/2015 Pneumococcal Vaccine (Unspecified Type) 10/15/2012 Tdap 1/11/2016 ZZZ-RETIRED (DO NOT USE) Pneumococcal Vaccine (Unspecified Type) 10/24/2011 Zoster Vaccine, Live 7/28/2012 Not reviewed this visit You Were Diagnosed With   
  
 Codes Comments Chronic atrial fibrillation (HCC)    -  Primary ICD-10-CM: E63.5 ICD-9-CM: 427.31 Cardiac pacemaker in situ     ICD-10-CM: Z95.0 ICD-9-CM: V45.01 Dyslipidemia     ICD-10-CM: E78.5 ICD-9-CM: 272.4 Cerebral infarction, unspecified mechanism [facility]     ICD-10-CM: I63.9 ICD-9-CM: 434.91 Vitals BP Pulse Resp Height(growth percentile) Weight(growth percentile) SpO2  
 118/82 (BP 1 Location: Left arm, BP Patient Position: Sitting) 64 16 6' 2\" (1.88 m) 223 lb 6.4 oz (101.3 kg) 95% BMI Smoking Status 28.68 kg/m2 Never Smoker Vitals History BMI and BSA Data Body Mass Index Body Surface Area  
 28.68 kg/m 2 2.3 m 2 [facility] [address] [facility] [phone] Your Updated Medication List  
  
   
This list is accurate as of 6/4/18 11:00 AM.  Always use your most recent med list.  
  
  
  
  
 aspirin delayed-release 81 mg tablet Take 81 mg by mouth daily. atorvastatin 40 mg tablet Commonly known as:  LIPITOR  
TAKE 1 TABLET DAILY CLARITIN 10 mg tablet Generic drug:  loratadine  
take 10 mg by mouth daily. Delsym 30 mg/5 mL liquid Generic drug:  dextromethorphan Take 60 mg by mouth two (2) times daily as needed. * diclofenac sodium 20 mg/gram /actuation(2 %) Sopm  
Commonly known as:  PENNSAID  
1 Applicatorful by Apply Externally route four (4) times daily. Indications: OSTEOARTHROSIS OF THE KNEE * VOLTAREN 1 % Gel Generic drug:  diclofenac  
as needed. GLUCOSAMINE-CHONDROITIN PO Take  by mouth daily. 1 tabs twice a day  
  
 guaiFENesin-dextromethorphan -30 mg per tablet Commonly known as:  EverConnect Ferny DM Take 1 Tab by mouth every twelve (12) hours as needed for Cough. ketoconazole 2 % topical cream  
Commonly known as:  NIZORAL  
daily as needed. KRILL OIL PO Take 1 Cap by mouth daily. melatonin 5 mg Cap capsule Take 5 mg by mouth nightly as needed. PRADAXA 150 mg capsule Generic drug:  dabigatran etexilate TAKE 1 CAPSULE EVERY 12 HOURS  
  
 PROBIOTIC 4X 10-15 mg Tbec Generic drug:  B.infantis-B.ani-B.long-B.bifi Take 1 Cap by mouth daily. RAPAFLO 8 mg capsule Generic drug:  silodosin Take 8 mg by mouth daily (with breakfast). TOPROL XL 50 mg XL tablet Generic drug:  metoprolol succinate TAKE ONE AND ONE-HALF TABLETS DAILY  
  
 TYLENOL EXTRA STRENGTH 500 mg tablet Generic drug:  acetaminophen Take 2 tablets by mouth three (3) times daily as needed for Pain. VITAMIN D3 1,000 unit tablet Generic drug:  cholecalciferol Take  by mouth daily. * Notice: This list has 2 medication(s) that are the same as other medications prescribed for you. Read the directions carefully, and ask your doctor or other care provider to review them with you. Follow-up Instructions Return in about 6 months (around 12/4/2018). Patient Instructions Medici is a chat platform to communicate between your doctor (myself) and you about any medical issues. It is free to join. Please use this for non urgent medical concerns. If you need me urgently, please call the office at [phone] or call 911 if it is an emergency Go to your mobile phone, your activation code specific to me is - [email or web address] Introducing [facility] & HEALTH SERVICES! Dear Ani Else: 
Thank you for requesting a [email or web address] account. Our records indicate that you already have an active [email or web address] account. You can access your account anytime at [email or web address] [employer] A Twist Did you know that you can access your hospital and ER discharge instructions at any time in [email or web address]?   You can also review all of your test results from your hospital stay or ER visit. Additional Information If you have questions, please visit the Frequently Asked Questions section of the [employer] website at [email or web address] Remember, [employer] is NOT to be used for urgent needs. For medical emergencies, dial 911. Now available from your iPhone and Android! Please provide this summary of care documentation to your next provider. Your primary care clinician is listed as José Lima. If you have any questions after today's visit, please call [phone].

## 2018-06-07 NOTE — TELEPHONE ENCOUNTER
----- Message from Nancy Patel MD sent at 6/7/2018 11:29 AM CDT -----  Cont current coumadin dose and recheck in 2 weeks Patient states he was recently seen for the flu and was told if his coughing didn't die down that PCP would send puneet cough medication to help.  Patient is interested in receiving that cough medication due to still having \"a pretty serious cough \"    Patient can be reached at [phone]

## 2018-07-03 RX ORDER — ATORVASTATIN CALCIUM 40 MG/1
TABLET, FILM COATED ORAL
Qty: 90 TAB | Refills: 5 | Status: SHIPPED | OUTPATIENT
Start: 2018-07-03 | End: 2019-07-07 | Stop reason: SDUPTHER

## 2018-12-10 RX ORDER — METOPROLOL SUCCINATE 50 MG/1
TABLET, EXTENDED RELEASE ORAL
Qty: 135 TAB | Refills: 1 | Status: SHIPPED | OUTPATIENT
Start: 2018-12-10 | End: 2019-07-07 | Stop reason: SDUPTHER

## 2019-01-07 ENCOUNTER — OFFICE VISIT (OUTPATIENT)
Dept: CARDIOLOGY CLINIC | Age: 74
End: 2019-01-07

## 2019-01-07 VITALS
SYSTOLIC BLOOD PRESSURE: 136 MMHG | BODY MASS INDEX: 29.39 KG/M2 | DIASTOLIC BLOOD PRESSURE: 76 MMHG | RESPIRATION RATE: 18 BRPM | WEIGHT: 229 LBS | HEIGHT: 74 IN | HEART RATE: 64 BPM

## 2019-01-07 DIAGNOSIS — I65.23 BILATERAL CAROTID ARTERY STENOSIS: ICD-10-CM

## 2019-01-07 DIAGNOSIS — Z95.0 CARDIAC PACEMAKER IN SITU: ICD-10-CM

## 2019-01-07 DIAGNOSIS — E78.5 DYSLIPIDEMIA: ICD-10-CM

## 2019-01-07 DIAGNOSIS — I49.5 BRADY-TACHY SYNDROME (HCC): ICD-10-CM

## 2019-01-07 DIAGNOSIS — I48.20 CHRONIC ATRIAL FIBRILLATION (HCC): Primary | ICD-10-CM

## 2019-01-07 NOTE — PROGRESS NOTES
Visit Vitals  /76 (BP 1 Location: Left arm)   Pulse 64   Resp 18   Ht 6' 2\" (1.88 m)   Wt 229 lb (103.9 kg)   BMI 29.40 kg/m²       Patient is here for follow up. Doing well. Thinks he may be having issues with reflux.

## 2019-01-07 NOTE — PROGRESS NOTES
HISTORY OF PRESENT ILLNESS  Caty Casas is a 74 y.o. male. Last seen 7 months ago. Problem List  Date Reviewed: 5/14/2018          Codes Class Noted    Pacemaker ICD-10-CM: Z95.0  ICD-9-CM: V45.01  12/28/2016        Gastroesophageal reflux disease without esophagitis ICD-10-CM: K21.9  ICD-9-CM: 530.81  8/5/2016        Advanced care planning/counseling discussion ICD-10-CM: Z71.89  ICD-9-CM: V65.49  5/5/2016    Overview Signed 5/5/2016  3:38 PM by Robby Marcus RN     Patient states that a completed AMD is at home. NN encouraged patient to bring a copy to the office for scanning into the medical record. Patient verbalized understanding.                Bilateral carotid artery disease ([facility] ICD-10-CM: I77.9  ICD-9-CM: 447.9  11/26/2014        Dyslipidemia ICD-10-CM: E78.5  ICD-9-CM: 272.4  6/5/2014        Elevated Lp(a) ICD-10-CM: E78.41  ICD-9-CM: 272.8  6/5/2014        Cerebral infarction [facility]) ICD-10-CM: I63.9  ICD-9-CM: 434.91  3/15/2014        James-tachy syndrome ([facility] ICD-10-CM: I49.5  ICD-9-CM: 427.81  3/27/2012        Chronic maxillary sinusitis ICD-10-CM: J32.0  ICD-9-CM: 473.0  12/8/2011    Overview Signed 12/8/2011  3:23 PM by Reina Arreola MD     Left chronic maxillary sinusitis             Family history of prostate cancer ICD-10-CM: Z80.42  ICD-9-CM: V16.42  9/16/2011        Scoliosis ICD-10-CM: M41.9  ICD-9-CM: 737.30  4/26/2011        Atrial fibrillation ([facility] ICD-10-CM: I48.91  ICD-9-CM: 427.31  3/22/2010        BPH (benign prostatic hyperplasia) ICD-10-CM: N40.0  ICD-9-CM: 600.00  1/27/2010        OA (osteoarthritis) of knee ICD-10-CM: M17.10  ICD-9-CM: 715.36  2/11/2009        Skin moles ICD-10-CM: D22.9  ICD-9-CM: 216.9  2/11/2009        Colon polyp ICD-10-CM: K63.5  ICD-9-CM: 211.3  2/11/2009        AR (allergic rhinitis) ICD-10-CM: J30.9  ICD-9-CM: 477.9  2/11/2009        Thyroid nodule ICD-10-CM: E04.1  ICD-9-CM: 241.0  2/11/2009               Past Cardiac History Disha Esquivel Melvin [facility]) :     Atrial fibrillation  -  dx 2003  - rythm management until 2008  -No structural or ischemic heart disease  Sinus node dysfunction s/p dual chamber pacer 8/09 [facility])  Orthostatic hypotension  Carotid disease, mild to moderate       Cardiac testing  ETT April 2012 - 8 min, resting HR 81, peak   Carotid duplex 8/17/2012 - 10-49% bilateral  Echo 3/4/13: EF 55-60%; LA: moderately dilated; MV and TV: mild regurg.; no sig. hange from previous echo  3/2013 - abnormal overnight oximetry, normal sleep study  KACEY 4/17/2014 - no intracardiac mass/thrombus or shunt, mild to moderate aortic arch plaque, EF 50%, moderate LAE, mild MR/AR. Carotid duplex 4/15/14 - 10-49% plaque bilateral, unchanged  Carotid duplex 6/8/15: 10-49% bilateral stenosis; unchanged. Echo 12/21/15 - EF 65 %. No WMA. Moderate WYATT. Mild TR. Mild pulmonary HTN with PASP 38mmHg. Carotid duplex 6/24/16 - 10-49% bilaterally, unchanged    3/28/17- SJM pacemaker generator change per Dr. Sri Araiza    Carotid duplex 4/18/17 - 10-49% bilaterally, unchanged. HPI  Mr. Buster Wolfe states he is doing well. He recently returned from a 2 week trip where he visited his daughter in [facility]. He has intermittent heartburn which occurs once every two weeks. He is trying to limit his intake of spicy foods. His DM is followed by Dorinda Conte MD.     Patient denies any exertional chest pain, dyspnea, palpitations, syncope, orthopnea, edema or paroxysmal nocturnal dyspnea. He has had no bleeding concerns. Cardiac risk factors   HTN no  DM no  Smoking no      Current Outpatient Medications   Medication Sig Dispense Refill    metoprolol succinate (TOPROL-XL) 50 mg XL tablet TAKE ONE AND ONE-HALF TABLETS DAILY 135 Tab 1    atorvastatin (LIPITOR) 40 mg tablet TAKE 1 TABLET DAILY 90 Tab 5    KRILL OIL PO Take 1 Cap by mouth daily.  PRADAXA 150 mg capsule TAKE 1 CAPSULE EVERY 12 HOURS 180 Cap 3    VOLTAREN 1 % gel as needed.       B.infantis-B.ani-B.long-B.bifi (PROBIOTIC 4X) 10-15 mg TbEC Take 1 Cap by mouth daily.  melatonin 5 mg cap capsule Take 5 mg by mouth nightly as needed.  acetaminophen (TYLENOL EXTRA STRENGTH) 500 mg tablet Take 2 tablets by mouth three (3) times daily as needed for Pain.  silodosin (RAPAFLO) 8 mg capsule Take 8 mg by mouth daily (with breakfast).  GLUCOSAMINE HCL/CHONDRO DONALDSON A (GLUCOSAMINE-CHONDROITIN PO) Take  by mouth daily.  aspirin delayed-release 81 mg tablet Take 81 mg by mouth daily.  cholecalciferol, vitamin d3, (VITAMIN D) 1,000 unit tablet Take  by mouth daily.  CLARITIN 10 mg Tab take 10 mg by mouth daily.  dextromethorphan (DELSYM) 30 mg/5 mL liquid Take 60 mg by mouth two (2) times daily as needed.  guaiFENesin-dextromethorphan SR (MUCINEX DM) 600-30 mg per tablet Take 1 Tab by mouth every twelve (12) hours as needed for Cough. 30 Tab 0     Allergies   Allergen Reactions    Other Plant, Animal, Environmental Anaphylaxis     Entire body edema with fire ants    Amoxicillin Rash    Clindamycin Hives and Rash    Pcn [Penicillins] Rash     States he is able to tolerate cephalexin with no adverse reaction    Sulfa (Sulfonamide Antibiotics) Rash    Venom-Honey Bee Rash     Yellowjackets       Review of Systems   Constitutional: Negative for fever, chills, weight loss, and diaphoresis. Respiratory: Negative for cough, hemoptysis, sputum production, shortness of breath and wheezing. Cardiovascular: Negative for chest pain, palpitations, orthopnea, claudication, leg swelling and PND. Gastrointestinal: Negative for nausea, vomiting, blood in stool and melena. +heartburn  Genitourinary: Negative for dysuria, hematuria and flank pain. Neurological: Negative for speech change, focal weakness, seizures, loss of consciousness, weakness and headaches. Endo/Heme/Allergies: Does not bruise/bleed easily. Psychiatric/Behavioral: Negative for memory loss.  The patient does not have insomnia. Visit Vitals  /76 (BP 1 Location: Left arm)   Pulse 64   Resp 18   Ht 6' 2\" (1.88 m)   Wt 229 lb (103.9 kg)   BMI 29.40 kg/m²        Wt Readings from Last 3 Encounters:   01/07/19 229 lb (103.9 kg)   06/04/18 223 lb 6.4 oz (101.3 kg)   05/14/18 224 lb (101.6 kg)     Physical Exam   Vitals reviewed. Constitutional: He is oriented to person, place, and time. He appears well-developed. Head: Normocephalic. Neck: Neck supple. No JVD present. No tracheal deviation present. Cardiovascular: Irregular rhythm and normal heart sounds. Exam reveals no gallop and no friction rub. No murmur heard. Pulmonary/Chest: Effort normal and breath sounds normal. He has no wheezes. He has no rales. Abdominal: Soft. Bowel sounds are normal. No tenderness. Musculoskeletal: He exhibits no edema. Neurological: He is alert and oriented to person, place, and time. Skin: Skin is warm and dry. Psychiatric: He has a normal mood and affect. Results for orders placed or performed in visit on 10/24/17   NMR LIPOPROFILE     Status: None   Result Value Ref Range Status    LDL-P 549 <1,000 nmol/L Final     Comment:                           Low                   < 1000                            Moderate         1000 - 1299                            Borderline-High  1300 - 1599                            High             1600 - 2000                            Very High             > 2000      LDL-C 56 0 - 99 mg/dL Final     Comment:                           Optimal               <  100                            Above optimal     100 -  129                            Borderline        130 -  159                            High              160 -  189                            Very high             >  189  LDL-C is inaccurate if patient is non-fasting.       HDL-C 57 >39 mg/dL Final    Triglycerides 104 0 - 149 mg/dL Final    Cholesterol, Total 134 100 - 199 mg/dL Final    HDL-P (Total) 31.8 >=30.5 umol/L Final    Small LDL-P 133 <=527 nmol/L Final    LDL size 21.2 >20.5 nm Final     Comment:  ----------------------------------------------------------                   ** INTERPRETATIVE INFORMATION**                   PARTICLE CONCENTRATION AND SIZE                      <--Lower CVD Risk   Higher CVD Risk-->    LDL AND HDL PARTICLES   Percentile in Reference Population    HDL-P (total)        High     75th    50th    25th   Low                         >34.9    34.9    30.5    26.7   <26.7    Small LDL-P          Low      25th    50th    75th   High                         <117     117     527     839    >839    LDL Size   <-Large (Pattern A)->    <-Small (Pattern B)->                      23.0    20.6           20.5      19.0   ----------------------------------------------------------  Small LDL-P and LDL Size are associated with CVD risk, but not after  LDL-P is taken into account. These assays were developed and their performance characteristics  determined by [facility]. These assays have not been cleared by the  [employer] and Drug Administration. The clinical utility o  f these  laboratory values have not been fully established. LP-IR SCORE 29 <=45 Final     Comment: INSULIN RESISTANCE MARKER      <--Insulin Sensitive    Insulin Resistant-->             Percentile in Reference Population  Insulin Resistance Score  LP-IR Score   Low   25th   50th   75th   High                <27   27     45     63     >63  LP-IR Score is inaccurate if patient is non-fasting. The LP-IR score is a laboratory developed index that has been  associated with insulin resistance and diabetes risk and should be  used as one component of a physician's clinical assessment. The  LP-IR score listed above has not been cleared by the [employer] and  Drug Administration.       Narrative    Performed at:  [address]  [id number]  : Aracely Wilhelm MD, Phone: [phone]     Lab Results   Component Value Date/Time    Sodium 143 04/16/2018 10:13 AM    Potassium 4.3 04/16/2018 10:13 AM    Chloride 100 04/16/2018 10:13 AM    CO2 25 04/16/2018 10:13 AM    Anion gap 9 11/30/2015 10:00 AM    Glucose 87 04/16/2018 10:13 AM    BUN 23 04/16/2018 10:13 AM    Creatinine 0.98 04/16/2018 10:13 AM    BUN/Creatinine ratio 23 04/16/2018 10:13 AM    GFR est AA 87 04/16/2018 10:13 AM    GFR est non-AA 76 04/16/2018 10:13 AM    Calcium 9.0 04/16/2018 10:13 AM    Bilirubin, total 1.2 04/16/2018 10:13 AM    AST (SGOT) 23 04/16/2018 10:13 AM    Alk. phosphatase 115 04/16/2018 10:13 AM    Protein, total 6.6 04/16/2018 10:13 AM    Albumin 3.8 04/16/2018 10:13 AM    Globulin 2.9 05/04/2010 10:00 AM    A-G Ratio 1.4 04/16/2018 10:13 AM    ALT (SGPT) 20 04/16/2018 10:13 AM     Cardiographics  Pacer function 3/06/14 - normal  EKG 3/06/14 - Ventricular paced, underlying AF  EKG 12/14/15 - V paced, underlying AF  EKG 6/24/16 - V paced, underlying AF  Carotid duplex 6/24/16 - 10-49% bilaterally, unchanged    ASSESSMENT and PLAN  Encounter Diagnoses   Name Primary?  Chronic atrial fibrillation (HCC) Yes    Cardiac pacemaker in situ     Dyslipidemia     James-tachy syndrome (HCC)     Bilateral carotid artery stenosis       Mr. Iraida You has permanent atrial fibrillation complicated by conduction disease s/p dual chamber PM in 2009 and generator change 1 year ago. No bleeding issues on Pradaxa. Dyslipidemia. He will be getting updated lipids with Janet Wu MD in the near future. Mild carotid disease. Recheck duplex study in 6 months. Follow-up Disposition:  Return in about 6 months (around 7/7/2019). Written by Ysabel Henderson, as dictated by Dr. Cj Brooks.      Cj Brooks MD

## 2019-02-15 ENCOUNTER — HOSPITAL ENCOUNTER (OUTPATIENT)
Dept: LAB | Age: 74
Discharge: HOME | End: 2019-02-15
Payer: MEDICARE

## 2019-02-15 ENCOUNTER — OFFICE VISIT (OUTPATIENT)
Dept: INTERNAL MEDICINE CLINIC | Age: 74
End: 2019-02-15

## 2019-02-15 VITALS
HEIGHT: 74 IN | HEART RATE: 76 BPM | BODY MASS INDEX: 29.65 KG/M2 | RESPIRATION RATE: 16 BRPM | TEMPERATURE: 98 F | WEIGHT: 231 LBS | SYSTOLIC BLOOD PRESSURE: 114 MMHG | OXYGEN SATURATION: 98 % | DIASTOLIC BLOOD PRESSURE: 72 MMHG

## 2019-02-15 DIAGNOSIS — Z95.0 PACEMAKER: ICD-10-CM

## 2019-02-15 DIAGNOSIS — Z71.89 ADVANCED DIRECTIVES, COUNSELING/DISCUSSION: ICD-10-CM

## 2019-02-15 DIAGNOSIS — R13.10 DYSPHAGIA, UNSPECIFIED TYPE: ICD-10-CM

## 2019-02-15 DIAGNOSIS — E78.5 HYPERLIPIDEMIA, UNSPECIFIED HYPERLIPIDEMIA TYPE: ICD-10-CM

## 2019-02-15 DIAGNOSIS — R19.7 DIARRHEA, UNSPECIFIED TYPE: ICD-10-CM

## 2019-02-15 DIAGNOSIS — I49.5 BRADY-TACHY SYNDROME (HCC): ICD-10-CM

## 2019-02-15 DIAGNOSIS — I48.20 CHRONIC ATRIAL FIBRILLATION (HCC): ICD-10-CM

## 2019-02-15 DIAGNOSIS — Z00.00 MEDICARE ANNUAL WELLNESS VISIT, SUBSEQUENT: Primary | ICD-10-CM

## 2019-02-15 PROCEDURE — 36415 COLL VENOUS BLD VENIPUNCTURE: CPT

## 2019-02-15 PROCEDURE — 80061 LIPID PANEL: CPT

## 2019-02-15 PROCEDURE — 80053 COMPREHEN METABOLIC PANEL: CPT

## 2019-02-15 PROCEDURE — 85025 COMPLETE CBC W/AUTO DIFF WBC: CPT

## 2019-02-15 RX ORDER — RANITIDINE 150 MG/1
150 CAPSULE ORAL 2 TIMES DAILY
Status: ON HOLD | COMMUNITY
End: 2019-10-21

## 2019-02-15 RX ORDER — TADALAFIL 5 MG/1
TABLET, FILM COATED ORAL
COMMUNITY
Start: 2019-02-04 | End: 2021-01-07

## 2019-02-15 NOTE — PATIENT INSTRUCTIONS
Medicare Wellness Visit, Male The best way to live healthy is to have a lifestyle where you eat a well-balanced diet, exercise regularly, limit alcohol use, and quit all forms of tobacco/nicotine, if applicable. Regular preventive services are another way to keep healthy. Preventive services (vaccines, screening tests, monitoring & exams) can help personalize your care plan, which helps you manage your own care. Screening tests can find health problems at the earliest stages, when they are easiest to treat. [facility] follows the current, evidence-based guidelines published by the [facility] ([facility]) when recommending preventive services for our patients. Because we follow these guidelines, sometimes recommendations change over time as research supports it. (For example, a prostate screening blood test is no longer routinely recommended for men with no symptoms.) Of course, you and your doctor may decide to screen more often for some diseases, based on your risk and co-morbidities (chronic disease you are already diagnosed with). Preventive services for you include: - Medicare offers their members a free annual wellness visit, which is time for you and your primary care provider to discuss and plan for your preventive service needs. Take advantage of this benefit every year! 
-All adults over age 72 should receive the recommended pneumonia vaccines. Current USPSTF guidelines recommend a series of two vaccines for the best pneumonia protection.  
-All adults should have a flu vaccine yearly and an ECG.  All adults age 61 and older should receive a shingles vaccine once in their lifetime.   
-All adults age 38-68 who are overweight should have a diabetes screening test once every three years.  
-Other screening tests & preventive services for persons with diabetes include: an eye exam to screen for diabetic retinopathy, a kidney function test, a foot exam, and stricter control over your cholesterol.  
-Cardiovascular screening for adults with routine risk involves an electrocardiogram (ECG) at intervals determined by the provider.  
-Colorectal cancer screening should be done for adults age 54-65 with no increased risk factors for colorectal cancer. There are a number of acceptable methods of screening for this type of cancer. Each test has its own benefits and drawbacks. Discuss with your provider what is most appropriate for you during your annual wellness visit. The different tests include: colonoscopy (considered the best screening method), a fecal occult blood test, a fecal DNA test, and sigmoidoscopy. 
-All adults born between [facility] should be screened once for Hepatitis C. 
-An Abdominal Aortic Aneurysm (AAA) Screening is recommended for men age 73-68 who has ever smoked in their lifetime. Here is a list of your current Health Maintenance items (your personalized list of preventive services) with a due date: 
Health Maintenance Due Topic Date Due  Shingles Vaccine (1 of 2) 05/17/1993

## 2019-02-15 NOTE — PROGRESS NOTES
Marylen Odor is a 74 y.o. male who presents today for Annual Wellness Visit [facility] He has a history of  
Patient Active Problem List  
Diagnosis Code  OA (osteoarthritis) of knee M17.10  
 Skin moles D22.9  Colon polyp K63.5  AR (allergic rhinitis) J30.9  Thyroid nodule E04.1  BPH (benign prostatic hyperplasia) N40.0  Atrial fibrillation ([address] I48.91  
 Scoliosis M41.9  Family history of prostate cancer Z80.42  Chronic maxillary sinusitis J32.0  James-tachy syndrome (HCC) I49.5  Dyslipidemia E78.5  Elevated Lp(a) E78.41  
 Cerebral infarction (HCC) I63.9  Bilateral carotid artery disease (HCC) I77.9  Advanced care planning/counseling discussion Z71.89  
 Gastroesophageal reflux disease without esophagitis K21.9  
 Pacemaker Z95.0  
 Excess skin of eyelid H02.30 Kristan Petersen Today patient is here for complete physical exam. 
 
Diarrhea: having recurrent episodes of diarrhea. Every 2-3 weeks. Takes PRN imodium. Dysphagia: a bit worse recently. Mild GERD. Discussed that often times if he does not chew enough his food gets stuck in his throat. He then regurgitates his food. Afib/conduction abnormality: Patient currently seeing cardiology for this. He does have a pacemaker present. He is on Pradaxa for anticoagulation. Has been stable on this. Had a new pacer placed last year. Hyperlipidemia Currently he takes 40 mg of lipitor ROS: taking medications as instructed, no medication side effects noted No new myalgias, no joint pains, no weakness No TIA's, no chest pain on exertion, no dyspnea on exertion, no swelling of ankles. Lab Results Component Value Date/Time  Cholesterol, total 114 11/30/2015 10:00 AM  
 Cholesterol, Total 134 04/16/2018 10:13 AM  
 HDL Cholesterol 48 11/30/2015 10:00 AM  
 LDL, calculated 65 11/30/2015 10:00 AM  
 VLDL, calculated 12 12/13/2013 10:08 AM  
 Triglyceride 51 11/30/2015 10:00 AM  
 CHOL/HDL Ratio 2.8 05/04/2010 10:00 AM  
 
Health maintenance hx includes: 
Exercise: moderately active. Form of exercise: aerobic. Diet: generally follows a low fat low cholesterol diet, nonsmoker, alcohol intake none Social: bowling regularly, does water aerobis. Retired from [employer]. Lives at home with wife, Two daughters and 6 grandchildren. Cancer screening:  
 Colon cancer screening:  Last Colonoscopy: PSA with Dr. Arturo Alston. Prostate cancer screening: PSA and/or CARLOS: 2014 and was normal, 5 yr f/u. Immunizations: 
  
Immunization History Administered Date(s) Administered  Influenza High Dose Vaccine PF 09/25/2015, 09/30/2016, 10/02/2018  Influenza Vaccine 09/22/2014  Influenza Vaccine (Whole Virus) 10/15/2012  Influenza Vaccine Split 10/24/2011  Pneumococcal Conjugate (PCV-13) 09/25/2015  Pneumococcal Vaccine (Unspecified Type) 10/15/2012  Tdap 01/11/2016  ZZZ-RETIRED (DO NOT USE) Pneumococcal Vaccine (Unspecified Type) 10/24/2011  Zoster Vaccine, Live 07/28/2012 Immunization status: up to date and documented. ROS Review of Systems Constitutional: Negative for chills, fever and weight loss. HENT: Negative for congestion and sore throat. Chronic nasal drainage Eyes: Negative for blurred vision, double vision and photophobia. Respiratory: Negative for cough, hemoptysis, sputum production and shortness of breath. Cardiovascular: Negative for chest pain, palpitations, orthopnea and leg swelling. Gastrointestinal: Positive for diarrhea and heartburn. Negative for abdominal pain, constipation, nausea and vomiting. Occasional dysphasia Genitourinary: Negative for dysuria, frequency and urgency. Musculoskeletal: Negative for joint pain and myalgias. Skin: Negative for rash. Neurological: Negative. Negative for headaches. Endo/Heme/Allergies: Does not bruise/bleed easily. Psychiatric/Behavioral: Negative for depression, hallucinations, memory loss and suicidal ideas. Visit Vitals /72 (BP 1 Location: Left arm, BP Patient Position: Sitting) Pulse 76 Temp 98 °F (36.7 °C) (Oral) Resp 16 Ht 6' 2\" (1.88 m) Wt 231 lb (104.8 kg) SpO2 98% BMI 29.66 kg/m² Physical Exam  
Constitutional: He is oriented to person, place, and time. He appears well-developed and well-nourished. HENT:  
Head: Normocephalic and atraumatic. Right Ear: External ear normal.  
Left Ear: External ear normal.  
Eyes: EOM are normal. Pupils are equal, round, and reactive to light. Neck: Normal range of motion. Neck supple. No thyromegaly present. Cardiovascular: Normal rate and regular rhythm. No murmur heard. Pacer to right upper chest  
Pulmonary/Chest: Effort normal and breath sounds normal. No stridor. No respiratory distress. Abdominal: Soft. Bowel sounds are normal. He exhibits no distension. There is no tenderness. There is no guarding. Musculoskeletal: Normal range of motion. He exhibits no edema. Neurological: He is alert and oriented to person, place, and time. Skin: Skin is warm and dry. He is not diaphoretic. Psychiatric: He has a normal mood and affect. His behavior is normal.  
 
 
 
Current Outpatient Medications Medication Sig  CIALIS 5 mg tablet  metoprolol succinate (TOPROL-XL) 50 mg XL tablet TAKE ONE AND ONE-HALF TABLETS DAILY  atorvastatin (LIPITOR) 40 mg tablet TAKE 1 TABLET DAILY  KRILL OIL PO Take 1 Cap by mouth daily.  PRADAXA 150 mg capsule TAKE 1 CAPSULE EVERY 12 HOURS  VOLTAREN 1 % gel as needed.  B.infantis-B.ani-B.long-B.bifi (PROBIOTIC 4X) 10-15 mg TbEC Take 1 Cap by mouth daily.  melatonin 5 mg cap capsule Take 5 mg by mouth nightly as needed.  acetaminophen (TYLENOL EXTRA STRENGTH) 500 mg tablet Take 2 tablets by mouth three (3) times daily as needed for Pain.  silodosin (RAPAFLO) 8 mg capsule Take 8 mg by mouth daily (with breakfast).  GLUCOSAMINE HCL/CHONDRO DONALDSON A (GLUCOSAMINE-CHONDROITIN PO) Take  by mouth daily.  aspirin delayed-release 81 mg tablet Take 81 mg by mouth daily.  cholecalciferol, vitamin d3, (VITAMIN D) 1,000 unit tablet Take  by mouth daily.  CLARITIN 10 mg Tab take 10 mg by mouth daily.  dextromethorphan (DELSYM) 30 mg/5 mL liquid Take 60 mg by mouth two (2) times daily as needed.  guaiFENesin-dextromethorphan SR (MUCINEX DM) 600-30 mg per tablet Take 1 Tab by mouth every twelve (12) hours as needed for Cough. No current facility-administered medications for this visit. Past Medical History:  
Diagnosis Date  AR (allergic rhinitis) 2/11/2009  Atrial fibrillation ([facility] [address]   
 onset 2003, now chronic, CHADS2 1-2.  BPH (benign prostatic hypertrophy)  James-tachy syndrome ([address] 3/27/2012  CAD (coronary artery disease)  Chronic sinus infection   
 left side  Colon polyp 2/11/2009  CVA (cerebral infarction) 3/1/2014  Dyslipidemia 6/5/2014  Elevated Lp(a) 6/5/2014  GERD (gastroesophageal reflux disease) fall 2013  GERD (gastroesophageal reflux disease)  Hypercholesterolemia 2/11/2009  Ill-defined condition Hx colon polyps  OA (osteoarthritis) of knee 2/11/2009  Other ill-defined conditions(799.89)   
 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  Pacemaker  PSA elevation 07/2017  
 Skin moles 2/11/2009  Stroke [facility])   
 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  
Procedure Laterality Date  CARDIAC SURG PROCEDURE UNLIST    
 pacemaker placement  ECHO 2D ADULT  8/15/11 EF 55%, biatrial enlargement, mild MR  
 ENDOSCOPY, COLON, DIAGNOSTIC    
 HX HEENT  9/2008  
 nasal septoplasty  HX KNEE ARTHROSCOPY  07/2016  HX ORTHOPAEDIC  07/01/2016 Right Knee Scope [facility] PACEMAKER  Aug 2009 Dr. Mary Jo Garza  HX REFRACTIVE SURGERY  06/2017 Right Eye laser treatment  HX VASECTOMY  25-30 yrs ago  STRESS TEST CARDIOLITE  4/2008  
 11 min., normal perfusion, EF 57%  VAS CAROTID DUPLEX BILATERAL  8/15/11  
 10-49% bilateral, unchanged from one year prior Social History Tobacco Use  Smoking status: Never Smoker  Smokeless tobacco: Never Used Substance Use Topics  Alcohol use: No  
  Comment: no   
  
Family History Problem Relation Age of Onset  Stroke Mother  Cancer Father   
     prostate  Hypertension Father  Diabetes Brother  Cancer Brother   
     prostate  Diabetes Brother  Hypertension Brother  Cancer Brother   
     melanoma  Cancer Daughter   
     melanoma Allergies Allergen Reactions  Other Plant, Animal, Environmental Anaphylaxis Entire body edema with fire ants  Amoxicillin Rash  Clindamycin Hives and Rash  Pcn [Penicillins] Rash States he is able to tolerate cephalexin with no adverse reaction  Sulfa (Sulfonamide Antibiotics) Rash  Venom-Honey Bee Rash Manuel Sis Assessment/Plan Diagnoses and all orders for this visit: 
 
1. Medicare annual wellness visit, subsequent -discussed shingles vaccine. Patient gets his PSA checked with the urologist.  Otherwise health maintenance is up-to-date. Gladys Murry was counseled on age-appropriate/ guideline-based risk prevention behaviors and screening for a 74y.o. year old   male . We also discussed adjustments in screening based on family history if necessary. Printed instructions for preventative screening guidelines and healthy behaviors given to patient with after visit summary. 2. Advanced directives, counseling/discussion 3. Diarrhea, unspecified type -patient has bouts of diarrhea. This may be some mild his food allergies. Colonoscopy is up-to-date. I discussed using Metamucil daily to see if this does not help 4. Hyperlipidemia, unspecified hyperlipidemia type -patient to get cholesterol check today -     METABOLIC PANEL, COMPREHENSIVE 
-     LIPID PANEL 
-     CBC WITH AUTOMATED DIFF 5. Dysphagia, unspecified type -mild dysphasia occasional with solids. I discussed getting a barium swallow. She may need more aggressive GERD control 
-     XR BA SWALLOW ESOPHOGRAM; Future 6. Pacemaker 7. Chronic atrial fibrillation (HCC) -on anticoagulation being paced. Patient seeing cardiology 8. James-tachy syndrome ([address] Follow-up Disposition: Not on File Nasir Grossman MD 
2/15/2019 This is the Subsequent Medicare Annual Wellness Exam, performed 12 months or more after the Initial AWV or the last Subsequent AWV I have reviewed the patient's medical history in detail and updated the computerized patient record. History Past Medical History:  
Diagnosis Date  AR (allergic rhinitis) 2/11/2009  Atrial fibrillation ([address]   
 onset 2003, now chronic, CHADS2 1-2.  BPH (benign prostatic hypertrophy)  James-tachy syndrome ([address] 3/27/2012  CAD (coronary artery disease)  Chronic sinus infection   
 left side  Colon polyp 2/11/2009  CVA (cerebral infarction) 3/1/2014  Dyslipidemia 6/5/2014  Elevated Lp(a) 6/5/2014  GERD (gastroesophageal reflux disease) fall 2013  GERD (gastroesophageal reflux disease)  Hypercholesterolemia 2/11/2009  Ill-defined condition Hx colon polyps  OA (osteoarthritis) of knee 2/11/2009  Other ill-defined conditions(799.89)   
 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  Pacemaker  PSA elevation 07/2017  
 Skin moles 2/11/2009  Stroke [facility])   
 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  
Procedure Laterality Date  CARDIAC SURG PROCEDURE UNLIST    
 pacemaker placement  ECHO 2D ADULT  8/15/11  EF 55%, biatrial enlargement, mild MR  
  ENDOSCOPY, COLON, DIAGNOSTIC    
 HX HEENT  9/2008  
 nasal septoplasty  HX KNEE ARTHROSCOPY  07/2016  HX ORTHOPAEDIC  07/01/2016 Right Knee Scope [facility] PACEMAKER  Aug 2009 Dr. Sukhjinder Meadows  HX REFRACTIVE SURGERY  06/2017 Right Eye laser treatment  HX VASECTOMY  25-30 yrs ago  STRESS TEST CARDIOLITE  4/2008  
 11 min., normal perfusion, EF 57%  VAS CAROTID DUPLEX BILATERAL  8/15/11  
 10-49% bilateral, unchanged from one year prior Current Outpatient Medications Medication Sig Dispense Refill  CIALIS 5 mg tablet  metoprolol succinate (TOPROL-XL) 50 mg XL tablet TAKE ONE AND ONE-HALF TABLETS DAILY 135 Tab 1  
 atorvastatin (LIPITOR) 40 mg tablet TAKE 1 TABLET DAILY 90 Tab 5  KRILL OIL PO Take 1 Cap by mouth daily.  PRADAXA 150 mg capsule TAKE 1 CAPSULE EVERY 12 HOURS 180 Cap 3  
 VOLTAREN 1 % gel as needed.  B.infantis-B.ani-B.long-B.bifi (PROBIOTIC 4X) 10-15 mg TbEC Take 1 Cap by mouth daily.  melatonin 5 mg cap capsule Take 5 mg by mouth nightly as needed.  acetaminophen (TYLENOL EXTRA STRENGTH) 500 mg tablet Take 2 tablets by mouth three (3) times daily as needed for Pain.  silodosin (RAPAFLO) 8 mg capsule Take 8 mg by mouth daily (with breakfast).  GLUCOSAMINE HCL/CHONDRO DONALDSON A (GLUCOSAMINE-CHONDROITIN PO) Take  by mouth daily.  aspirin delayed-release 81 mg tablet Take 81 mg by mouth daily.  cholecalciferol, vitamin d3, (VITAMIN D) 1,000 unit tablet Take  by mouth daily.  CLARITIN 10 mg Tab take 10 mg by mouth daily.  dextromethorphan (DELSYM) 30 mg/5 mL liquid Take 60 mg by mouth two (2) times daily as needed.  guaiFENesin-dextromethorphan SR (MUCINEX DM) 600-30 mg per tablet Take 1 Tab by mouth every twelve (12) hours as needed for Cough. 30 Tab 0 Allergies Allergen Reactions  Other Plant, Animal, Environmental Anaphylaxis Entire body edema with fire ants  Amoxicillin Rash  Clindamycin Hives and Rash  Pcn [Penicillins] Rash States he is able to tolerate cephalexin with no adverse reaction  Sulfa (Sulfonamide Antibiotics) Rash  Venom-Honey Bee Rash Rich Males Family History Problem Relation Age of Onset  Stroke Mother  Cancer Father   
     prostate  Hypertension Father  Diabetes Brother  Cancer Brother   
     prostate  Diabetes Brother  Hypertension Brother  Cancer Brother   
     melanoma  Cancer Daughter   
     melanoma Social History Tobacco Use  Smoking status: Never Smoker  Smokeless tobacco: Never Used Substance Use Topics  Alcohol use: No  
  Comment: no   
 
Patient Active Problem List  
Diagnosis Code  OA (osteoarthritis) of knee M17.10  
 Skin moles D22.9  Colon polyp K63.5  AR (allergic rhinitis) J30.9  Thyroid nodule E04.1  BPH (benign prostatic hyperplasia) N40.0  Atrial fibrillation ([address] I48.91  
 Scoliosis M41.9  Family history of prostate cancer Z80.42  Chronic maxillary sinusitis J32.0  James-tachy syndrome (HCC) I49.5  Dyslipidemia E78.5  Elevated Lp(a) E78.41  
 Cerebral infarction (HCC) I63.9  Bilateral carotid artery disease (HCC) I77.9  Advanced care planning/counseling discussion Z71.89  
 Gastroesophageal reflux disease without esophagitis K21.9  
 Pacemaker Z95.0  
 Excess skin of eyelid H02.30 Depression Risk Factor Screening:  
 
3 most recent PHQ Screens 2/15/2019 PHQ Not Done - Little interest or pleasure in doing things Not at all Feeling down, depressed, irritable, or hopeless Not at all Total Score PHQ 2 0 Trouble falling or staying asleep, or sleeping too much Not at all Feeling tired or having little energy Not at all Poor appetite, weight loss, or overeating Not at all Feeling bad about yourself - or that you are a failure or have let yourself or your family down Not at all Trouble concentrating on things such as school, work, reading, or watching TV Not at all Moving or speaking so slowly that other people could have noticed; or the opposite being so fidgety that others notice Not at all Thoughts of being better off dead, or hurting yourself in some way Not at all PHQ 9 Score 0 How difficult have these problems made it for you to do your work, take care of your home and get along with others Not difficult at all Alcohol Risk Factor Screening: You do not drink alcohol or very rarely. Functional Ability and Level of Safety:  
Hearing Loss Hearing is good. Activities of Daily Living The home contains: no safety equipment. Patient does total self care Fall Risk Fall Risk Assessment, last 12 mths 2/15/2019 Able to walk? Yes Fall in past 12 months? No  
Fall with injury? -  
Number of falls in past 12 months - Fall Risk Score -  
 
 
Abuse Screen Patient is not abused Cognitive Screening Evaluation of Cognitive Function: 
Has your family/caregiver stated any concerns about your memory: no 
Normal 
 
Patient Care Team  
Patient Care Team: 
Janet Wu MD as PCP - General 
Tom Bridges MD as Surgeon (Orthopedic Surgery) Eber Krabbe, MD (Urology) Genaro Roth MD (Ophthalmology) Nati Doherty MD (Orthopedic Surgery) Chay Mccollum RN as Ambulatory Care Navigator (Internal Medicine) Eber Krabbe, MD (Urology) Assessment/Plan Education and counseling provided: 
Are appropriate based on today's review and evaluation End-of-Life planning (with patient's consent) Pneumococcal Vaccine Prostate cancer screening tests (PSA, covered annually) Colorectal cancer screening tests Diagnoses and all orders for this visit: 
 
1. Medicare annual wellness visit, subsequent 2. Advanced directives, counseling/discussion Health Maintenance Due Topic Date Due  Shingrix Vaccine Age 50> (1 of 2) 05/17/1993

## 2019-02-15 NOTE — ACP (ADVANCE CARE PLANNING)
Advance Care Planning    Advance Care Planning (ACP) Provider Conversation Snapshot    Date of ACP Conversation: 02/15/19  Persons included in Conversation:  patient  Length of ACP Conversation in minutes:  <16 minutes (Non-Billable)    Authorized Decision Maker (if patient is incapable of making informed decisions):    This person is:   Healthcare Agent/Medical Power of  under Advance Directive          For Patients with Decision Making Capacity:   Values/Goals: Exploration of values, goals, and preferences if recovery is not expected, even with continued medical treatment in the event of:  Imminent death  Severe, permanent brain injury    Conversation Outcomes / Follow-Up Plan:   Recommended communicating the plan and making copies for the healthcare agent, personal physician, and others as appropriate (e.g., health system)

## 2019-02-16 LAB
ALBUMIN SERPL-MCNC: 4 G/DL (ref 3.5–4.8)
ALBUMIN/GLOB SERPL: 1.7 {RATIO} (ref 1.2–2.2)
ALP SERPL-CCNC: 96 IU/L (ref 39–117)
ALT SERPL-CCNC: 16 IU/L (ref 0–44)
AST SERPL-CCNC: 25 IU/L (ref 0–40)
BASOPHILS # BLD AUTO: 0 X10E3/UL (ref 0–0.2)
BASOPHILS NFR BLD AUTO: 0 %
BILIRUB SERPL-MCNC: 1.2 MG/DL (ref 0–1.2)
BUN SERPL-MCNC: 13 MG/DL (ref 8–27)
BUN/CREAT SERPL: 13 (ref 10–24)
CALCIUM SERPL-MCNC: 8.9 MG/DL (ref 8.6–10.2)
CHLORIDE SERPL-SCNC: 106 MMOL/L (ref 96–106)
CHOLEST SERPL-MCNC: 109 MG/DL (ref 100–199)
CO2 SERPL-SCNC: 25 MMOL/L (ref 20–29)
CREAT SERPL-MCNC: 1.01 MG/DL (ref 0.76–1.27)
EOSINOPHIL # BLD AUTO: 0 X10E3/UL (ref 0–0.4)
EOSINOPHIL NFR BLD AUTO: 1 %
ERYTHROCYTE [DISTWIDTH] IN BLOOD BY AUTOMATED COUNT: 13.7 % (ref 12.3–15.4)
GLOBULIN SER CALC-MCNC: 2.3 G/DL (ref 1.5–4.5)
GLUCOSE SERPL-MCNC: 81 MG/DL (ref 65–99)
HCT VFR BLD AUTO: 47.4 % (ref 37.5–51)
HDLC SERPL-MCNC: 49 MG/DL
HGB BLD-MCNC: 16 G/DL (ref 13–17.7)
IMM GRANULOCYTES # BLD AUTO: 0 X10E3/UL (ref 0–0.1)
IMM GRANULOCYTES NFR BLD AUTO: 0 %
INTERPRETATION, 910389: NORMAL
LDLC SERPL CALC-MCNC: 52 MG/DL (ref 0–99)
LYMPHOCYTES # BLD AUTO: 1.5 X10E3/UL (ref 0.7–3.1)
LYMPHOCYTES NFR BLD AUTO: 31 %
MCH RBC QN AUTO: 30.9 PG (ref 26.6–33)
MCHC RBC AUTO-ENTMCNC: 33.8 G/DL (ref 31.5–35.7)
MCV RBC AUTO: 92 FL (ref 79–97)
MONOCYTES # BLD AUTO: 0.4 X10E3/UL (ref 0.1–0.9)
MONOCYTES NFR BLD AUTO: 9 %
NEUTROPHILS # BLD AUTO: 2.7 X10E3/UL (ref 1.4–7)
NEUTROPHILS NFR BLD AUTO: 59 %
PLATELET # BLD AUTO: 138 X10E3/UL (ref 150–379)
POTASSIUM SERPL-SCNC: 4.7 MMOL/L (ref 3.5–5.2)
PROT SERPL-MCNC: 6.3 G/DL (ref 6–8.5)
RBC # BLD AUTO: 5.18 X10E6/UL (ref 4.14–5.8)
SODIUM SERPL-SCNC: 143 MMOL/L (ref 134–144)
TRIGL SERPL-MCNC: 42 MG/DL (ref 0–149)
VLDLC SERPL CALC-MCNC: 8 MG/DL (ref 5–40)
WBC # BLD AUTO: 4.7 X10E3/UL (ref 3.4–10.8)

## 2019-02-21 ENCOUNTER — HOSPITAL ENCOUNTER (OUTPATIENT)
Dept: GENERAL RADIOLOGY | Age: 74
Discharge: HOME | End: 2019-02-21
Attending: INTERNAL MEDICINE
Payer: MEDICARE

## 2019-02-21 DIAGNOSIS — R13.10 DYSPHAGIA, UNSPECIFIED TYPE: ICD-10-CM

## 2019-02-21 PROCEDURE — 74220 X-RAY XM ESOPHAGUS 1CNTRST: CPT

## 2019-02-22 DIAGNOSIS — K44.9 HIATAL HERNIA: ICD-10-CM

## 2019-02-22 DIAGNOSIS — I48.20 CHRONIC ATRIAL FIBRILLATION (HCC): ICD-10-CM

## 2019-02-22 DIAGNOSIS — K44.9 HIATAL HERNIA: Primary | ICD-10-CM

## 2019-02-22 RX ORDER — DABIGATRAN ETEXILATE MESYLATE 150 MG/1
CAPSULE ORAL
Qty: 180 CAP | Refills: 3 | Status: SHIPPED | OUTPATIENT
Start: 2019-02-22 | End: 2020-01-21 | Stop reason: SDUPTHER

## 2019-02-22 RX ORDER — OMEPRAZOLE 20 MG/1
20 CAPSULE, DELAYED RELEASE ORAL DAILY
Qty: 30 CAP | Refills: 1 | Status: SHIPPED | OUTPATIENT
Start: 2019-02-22 | End: 2019-02-22 | Stop reason: SDUPTHER

## 2019-02-22 RX ORDER — OMEPRAZOLE 20 MG/1
CAPSULE, DELAYED RELEASE ORAL
Qty: 90 CAP | Refills: 1 | Status: SHIPPED | OUTPATIENT
Start: 2019-02-22 | End: 2019-05-08 | Stop reason: SDUPTHER

## 2019-04-16 ENCOUNTER — TELEPHONE (OUTPATIENT)
Dept: CARDIOLOGY CLINIC | Age: 74
End: 2019-04-16

## 2019-04-16 NOTE — TELEPHONE ENCOUNTER
Patient states he woke feeling dizzy and uneasy. He states that feeling will not go away. He also states he feels a little flutter in his chest, but no pain! Please advise!     He can be reached at [phone]

## 2019-04-16 NOTE — TELEPHONE ENCOUNTER
Cassandra Corrales stated woke up at 0400 with dizziness and fluttering in chest. Denies SOB. /75 HR 60  Pacemaker checked with Merari Puff. Pt pacemaker set at 60/min and chronic Afib.   Patient needs an annual check for pacemaker     Vanna Patel advise

## 2019-04-24 ENCOUNTER — CLINICAL SUPPORT (OUTPATIENT)
Dept: CARDIOLOGY CLINIC | Age: 74
End: 2019-04-24

## 2019-04-24 DIAGNOSIS — Z95.0 CARDIAC PACEMAKER IN SITU: Primary | ICD-10-CM

## 2019-05-08 DIAGNOSIS — K44.9 HIATAL HERNIA: ICD-10-CM

## 2019-05-08 RX ORDER — OMEPRAZOLE 20 MG/1
CAPSULE, DELAYED RELEASE ORAL
Qty: 90 CAP | Refills: 3 | Status: SHIPPED | OUTPATIENT
Start: 2019-05-08 | End: 2020-04-01

## 2019-07-08 RX ORDER — ATORVASTATIN CALCIUM 40 MG/1
40 TABLET, FILM COATED ORAL DAILY
Qty: 90 TAB | Refills: 3 | Status: SHIPPED | OUTPATIENT
Start: 2019-07-08 | End: 2020-09-22

## 2019-07-08 RX ORDER — METOPROLOL SUCCINATE 50 MG/1
75 TABLET, EXTENDED RELEASE ORAL DAILY
Qty: 135 TAB | Refills: 1 | Status: SHIPPED | OUTPATIENT
Start: 2019-07-08 | End: 2020-01-21 | Stop reason: SDUPTHER

## 2019-07-18 ENCOUNTER — OFFICE VISIT (OUTPATIENT)
Dept: CARDIOLOGY CLINIC | Age: 74
End: 2019-07-18

## 2019-07-18 VITALS
OXYGEN SATURATION: 94 % | HEIGHT: 74 IN | BODY MASS INDEX: 28.49 KG/M2 | WEIGHT: 222 LBS | DIASTOLIC BLOOD PRESSURE: 80 MMHG | HEART RATE: 66 BPM | SYSTOLIC BLOOD PRESSURE: 120 MMHG

## 2019-07-18 DIAGNOSIS — I48.21 PERMANENT ATRIAL FIBRILLATION (HCC): Primary | ICD-10-CM

## 2019-07-18 DIAGNOSIS — I65.23 BILATERAL CAROTID ARTERY STENOSIS: ICD-10-CM

## 2019-07-18 DIAGNOSIS — Z95.0 PACEMAKER: ICD-10-CM

## 2019-07-18 DIAGNOSIS — E78.5 DYSLIPIDEMIA: ICD-10-CM

## 2019-07-18 NOTE — LETTER
7/18/19 Patient: Den Castano YOB: 1945 Date of Visit: 7/18/2019 Alberto Truong MD 
[address] [facility] [address] VIA In Basket Dear Alberto Truong MD, Thank you for referring Mr. Jim Beck to [address] for evaluation. My notes for this consultation are attached. If you have questions, please do not hesitate to call me. I look forward to following your patient along with you. Sincerely, Evelyne Leong MD

## 2019-07-18 NOTE — PROGRESS NOTES
Camila Rebolledo, [address]  [address],  [address]  [place], [address] [facility]    Office [phone]  Fax [phone]  Cell [phone]       Rosemary Thomas is a 66 y.o. male. Last seen by me 6 months ago. DIAGNOSES  Encounter Diagnoses     ICD-10-CM ICD-9-CM   1. Permanent atrial fibrillation (HCC) I48.2 427.31   2. Dyslipidemia E78.5 272.4   3. Bilateral carotid artery stenosis I65.23 433.10     433.30   4. Pacemaker Z95.0 V45.01       ASSESSMENT/PLAN    PAF complicated by conduction disease s/p pacemaker 2009 with generator change 2017. He is not pacer dependent  Continue Metoprolol plus Pradaxa. Mild carotid disease, unchanged by duplex study today. , going back to at least 2012. Recheck in 2-3 years. Dyslipidemia. LDL 52 2/2019. Continue Atorva 40mg/d. Recent dizziness - suggestive of acute labrynthitis, may have Meniere's disease. Urged him to see Dr Daljit Tee if he has recurrence      Follow-up and Dispositions    · Return in about 6 months (around 1/18/2020). HPI    Rosemary Thomas reports he is doing well overall, except for a spell of imbalance. He said he felt drunk, and was leaning heavily to one side. He thinks that melatonin was a factor. He is followed by Dr. Daljit Tee for ENT. He said he has tinnitus and is the high range for hearing loss. He keeps active with lawn mowing and bowling with no exertional sxs. His DM is followed by Lucille Sullivan MD.     Patient denies any exertional chest pain, dyspnea, palpitations, syncope, orthopnea, edema or paroxysmal nocturnal dyspnea. He has had no bleeding concerns. Of note, he sees his daughter in [place] a couple of times annually.     Cardiac risk factors   HTN no  DM no  Smoking no  Family Hx, mother with stroke, father and brother with HTN    Cardiac testing  ETT April 2012 - 8 min, resting HR 81, peak   Carotid duplex 8/17/2012 - 10-49% bilateral  Echo 3/4/13: EF 55-60%; LA: moderately dilated; MV and TV: mild regurg.; no sig. hange from previous echo  3/2013 - abnormal overnight oximetry, normal sleep study  KACEY 4/17/2014 - no intracardiac mass/thrombus or shunt, mild to moderate aortic arch plaque, EF 50%, moderate LAE, mild MR/AR. Carotid duplex 4/15/14 - 10-49% plaque bilateral, unchanged  Carotid duplex 6/8/15: 10-49% bilateral stenosis; unchanged. Echo 12/21/15 - EF 65 %. No WMA. Moderate WYATT. Mild TR. Mild pulmonary HTN with PASP 38mmHg. Carotid duplex 6/24/16 - 10-49% bilaterally, unchanged    3/28/17- SJM pacemaker generator change per Dr. Hussein Garcia    Carotid duplex 4/18/17 - 10-49% bilaterally, unchanged. Carotid duplex 7/18/19 - 10-49% bilateral, unchanged from 4/18/17     Current Outpatient Medications   Medication Sig Dispense Refill    metoprolol succinate (TOPROL-XL) 50 mg XL tablet Take 1.5 Tabs by mouth daily. 135 Tab 1    atorvastatin (LIPITOR) 40 mg tablet Take 1 Tab by mouth daily. 90 Tab 3    omeprazole (PRILOSEC) 20 mg capsule TAKE 1 CAPSULE BY MOUTH DAILY 90 Cap 3    PRADAXA 150 mg capsule TAKE 1 CAPSULE EVERY 12 HOURS 180 Cap 3    CIALIS 5 mg tablet       psyllium (METAMUCIL) packet Take 1 Packet by mouth.  KRILL OIL PO Take 1 Cap by mouth daily.  VOLTAREN 1 % gel as needed.  B.infantis-B.ani-B.long-B.bifi (PROBIOTIC 4X) 10-15 mg TbEC Take 1 Cap by mouth daily.  acetaminophen (TYLENOL EXTRA STRENGTH) 500 mg tablet Take 2 tablets by mouth three (3) times daily as needed for Pain.  silodosin (RAPAFLO) 8 mg capsule Take 8 mg by mouth daily (with breakfast).  GLUCOSAMINE HCL/CHONDRO DONALDSON A (GLUCOSAMINE-CHONDROITIN PO) Take  by mouth daily.  aspirin delayed-release 81 mg tablet Take 81 mg by mouth daily.  cholecalciferol, vitamin d3, (VITAMIN D) 1,000 unit tablet Take  by mouth daily.  CLARITIN 10 mg Tab take 10 mg by mouth daily.  raNITIdine hcl 150 mg capsule Take 150 mg by mouth two (2) times a day.       melatonin 5 mg cap capsule Take 5 mg by mouth nightly as needed. Allergies   Allergen Reactions    Other Plant, Animal, Environmental Anaphylaxis     Entire body edema with fire ants    Amoxicillin Rash    Clindamycin Hives and Rash    Pcn [Penicillins] Rash     States he is able to tolerate cephalexin with no adverse reaction    Sulfa (Sulfonamide Antibiotics) Rash    Venom-Honey Bee Rash     Yellowjackets       Review of Systems   Constitutional: Negative for fever, chills, weight loss, and diaphoresis. Respiratory: Negative for cough, hemoptysis, sputum production, shortness of breath and wheezing. Cardiovascular: Negative for chest pain, palpitations, orthopnea, claudication, leg swelling and PND. Gastrointestinal: Negative for nausea, heartburn, vomiting, blood in stool and melena. Genitourinary: Negative for dysuria, hematuria and flank pain. Neurological: Negative for speech change, focal weakness, seizures, loss of consciousness, weakness and headaches. +imbalance issues, tinnitus   Endo/Heme/Allergies: Does not bruise/bleed easily. Psychiatric/Behavioral: Negative for memory loss. The patient does not have insomnia. Visit Vitals  /80 (BP 1 Location: Right arm, BP Patient Position: Sitting)   Pulse 66   Ht 6' 2\" (1.88 m)   Wt 222 lb (100.7 kg)   SpO2 94%   BMI 28.50 kg/m²        Wt Readings from Last 3 Encounters:   07/18/19 222 lb (100.7 kg)   02/15/19 231 lb (104.8 kg)   01/07/19 229 lb (103.9 kg)     Physical Exam   Vitals reviewed. Constitutional: He is oriented to person, place, and time. He appears well-developed. Head: Normocephalic. Neck: Neck supple. No JVD present. No tracheal deviation present. Cardiovascular: Irregular rhythm and normal heart sounds. Exam reveals no gallop and no friction rub. No murmur heard. Pulmonary/Chest: Effort normal and breath sounds normal. He has no wheezes. He has no rales. Abdominal: Soft. Bowel sounds are normal. No tenderness. Musculoskeletal: He exhibits no edema. Neurological: He is alert and oriented to person, place, and time. Skin: Skin is warm and dry. Psychiatric: He has a normal mood and affect. Results for orders placed or performed in visit on 10/24/17   NMR LIPOPROFILE     Status: None   Result Value Ref Range Status    LDL-P 549 <1,000 nmol/L Final     Comment:                           Low                   < 1000                            Moderate         1000 - 1299                            Borderline-High  1300 - 1599                            High             1600 - 2000                            Very High             > 2000      LDL-C 56 0 - 99 mg/dL Final     Comment:                           Optimal               <  100                            Above optimal     100 -  129                            Borderline        130 -  159                            High              160 -  189                            Very high             >  189  LDL-C is inaccurate if patient is non-fasting.       HDL-C 57 >39 mg/dL Final    Triglycerides 104 0 - 149 mg/dL Final    Cholesterol, Total 134 100 - 199 mg/dL Final    HDL-P (Total) 31.8 >=30.5 umol/L Final    Small LDL-P 133 <=527 nmol/L Final    LDL size 21.2 >20.5 nm Final     Comment:  ----------------------------------------------------------                   ** INTERPRETATIVE INFORMATION**                   PARTICLE CONCENTRATION AND SIZE                      <--Lower CVD Risk   Higher CVD Risk-->    LDL AND HDL PARTICLES   Percentile in Reference Population    HDL-P (total)        High     75th    50th    25th   Low                         >34.9    34.9    30.5    26.7   <26.7    Small LDL-P          Low      25th    50th    75th   High                         <117     117     527     839    >839    LDL Size   <-Large (Pattern A)->    <-Small (Pattern B)->                      23.0    20.6           20.5      19.0 ----------------------------------------------------------  Small LDL-P and LDL Size are associated with CVD risk, but not after  LDL-P is taken into account. These assays were developed and their performance characteristics  determined by [employer]. These assays have not been cleared by the  [employer] and Drug Administration. The clinical utility o  f these  laboratory values have not been fully established. LP-IR SCORE 29 <=45 Final     Comment: INSULIN RESISTANCE MARKER      <--Insulin Sensitive    Insulin Resistant-->             Percentile in Reference Population  Insulin Resistance Score  LP-IR Score   Low   25th   50th   75th   High                <27   27     45     63     >63  LP-IR Score is inaccurate if patient is non-fasting. The LP-IR score is a laboratory developed index that has been  associated with insulin resistance and diabetes risk and should be  used as one component of a physician's clinical assessment. The  LP-IR score listed above has not been cleared by the [employer] and  Drug Administration. Narrative    Performed at:  [address]  [id number]  : Wen Tanner MD, Phone:  [phone]     Lab Results   Component Value Date/Time    Sodium 143 02/15/2019 12:20 PM    Potassium 4.7 02/15/2019 12:20 PM    Chloride 106 02/15/2019 12:20 PM    CO2 25 02/15/2019 12:20 PM    Anion gap 9 11/30/2015 10:00 AM    Glucose 81 02/15/2019 12:20 PM    BUN 13 02/15/2019 12:20 PM    Creatinine 1.01 02/15/2019 12:20 PM    BUN/Creatinine ratio 13 02/15/2019 12:20 PM    GFR est AA 84 02/15/2019 12:20 PM    GFR est non-AA 72 02/15/2019 12:20 PM    Calcium 8.9 02/15/2019 12:20 PM    Bilirubin, total 1.2 02/15/2019 12:20 PM    AST (SGOT) 25 02/15/2019 12:20 PM    Alk.  phosphatase 96 02/15/2019 12:20 PM    Protein, total 6.3 02/15/2019 12:20 PM    Albumin 4.0 02/15/2019 12:20 PM    Globulin 2.9 05/04/2010 10:00 AM    A-G Ratio 1.7 02/15/2019 12:20 PM ALT (SGPT) 16 02/15/2019 12:20 PM     Cardiographics  Pacer function 3/06/14 - normal  EKG 3/06/14 - Ventricular paced, underlying AF  EKG 12/14/15 - V paced, underlying AF  EKG 6/24/16 - V paced, underlying AF  Carotid duplex 6/24/16 - 10-49% bilaterally, unchanged  EKG 1/18/19 - Afib 60's with ventricular demand pacing   Carotid duplex 7/18/19 - 10-49% bilateral, unchanged from 4/18/17    Follow-up and Dispositions    · Return in about 6 months (around 1/18/2020).           Written by Xavier Florian, as dictated by Viral Rodriguez MD.      Dixon Kang

## 2019-07-18 NOTE — PROGRESS NOTES
Patient says that he was dizzy 3 weeks ago when getting up from bed and sitting. He was fatigue while that was going on     Patient wants to know if he should continue taking his Melatonin. He said when he stopped the dizziness stop.   Visit Vitals  /80 (BP 1 Location: Right arm, BP Patient Position: Sitting)   Pulse 66   Ht 6' 2\" (1.88 m)   Wt 222 lb (100.7 kg)   SpO2 94%   BMI 28.50 kg/m²

## 2019-10-08 ENCOUNTER — TELEPHONE (OUTPATIENT)
Dept: CARDIOLOGY CLINIC | Age: 74
End: 2019-10-08

## 2019-10-21 ENCOUNTER — ANESTHESIA (OUTPATIENT)
Dept: ENDOSCOPY | Age: 74
End: 2019-10-21
Payer: MEDICARE

## 2019-10-21 ENCOUNTER — HOSPITAL ENCOUNTER (OUTPATIENT)
Age: 74
Setting detail: OUTPATIENT SURGERY
Discharge: HOME | End: 2019-10-21
Attending: INTERNAL MEDICINE | Admitting: INTERNAL MEDICINE
Payer: MEDICARE

## 2019-10-21 ENCOUNTER — ANESTHESIA EVENT (OUTPATIENT)
Dept: ENDOSCOPY | Age: 74
End: 2019-10-21
Payer: MEDICARE

## 2019-10-21 VITALS
DIASTOLIC BLOOD PRESSURE: 61 MMHG | HEART RATE: 103 BPM | WEIGHT: 216.05 LBS | HEIGHT: 74 IN | SYSTOLIC BLOOD PRESSURE: 110 MMHG | OXYGEN SATURATION: 94 % | BODY MASS INDEX: 27.73 KG/M2 | TEMPERATURE: 98.6 F | RESPIRATION RATE: 15 BRPM

## 2019-10-21 PROCEDURE — 74011250636 HC RX REV CODE- 250/636: Performed by: NURSE ANESTHETIST, CERTIFIED REGISTERED

## 2019-10-21 PROCEDURE — 77030021593 HC FCPS BIOP ENDOSC BSC -A: Performed by: INTERNAL MEDICINE

## 2019-10-21 PROCEDURE — 36415 COLL VENOUS BLD VENIPUNCTURE: CPT

## 2019-10-21 PROCEDURE — 76060000031 HC ANESTHESIA FIRST 0.5 HR: Performed by: INTERNAL MEDICINE

## 2019-10-21 PROCEDURE — 76040000019: Performed by: INTERNAL MEDICINE

## 2019-10-21 PROCEDURE — 82784 ASSAY IGA/IGD/IGG/IGM EACH: CPT

## 2019-10-21 PROCEDURE — 88305 TISSUE EXAM BY PATHOLOGIST: CPT

## 2019-10-21 RX ORDER — NALOXONE HYDROCHLORIDE 0.4 MG/ML
0.4 INJECTION, SOLUTION INTRAMUSCULAR; INTRAVENOUS; SUBCUTANEOUS
Status: DISCONTINUED | OUTPATIENT
Start: 2019-10-21 | End: 2019-10-21 | Stop reason: HOSPADM

## 2019-10-21 RX ORDER — SODIUM CHLORIDE 9 MG/ML
50 INJECTION, SOLUTION INTRAVENOUS CONTINUOUS
Status: DISCONTINUED | OUTPATIENT
Start: 2019-10-21 | End: 2019-10-21 | Stop reason: HOSPADM

## 2019-10-21 RX ORDER — MIDAZOLAM HYDROCHLORIDE 1 MG/ML
.25-5 INJECTION, SOLUTION INTRAMUSCULAR; INTRAVENOUS
Status: DISCONTINUED | OUTPATIENT
Start: 2019-10-21 | End: 2019-10-21 | Stop reason: HOSPADM

## 2019-10-21 RX ORDER — DEXTROMETHORPHAN/PSEUDOEPHED 2.5-7.5/.8
1.2 DROPS ORAL
Status: DISCONTINUED | OUTPATIENT
Start: 2019-10-21 | End: 2019-10-21 | Stop reason: HOSPADM

## 2019-10-21 RX ORDER — EPINEPHRINE 0.1 MG/ML
1 INJECTION INTRACARDIAC; INTRAVENOUS
Status: DISCONTINUED | OUTPATIENT
Start: 2019-10-21 | End: 2019-10-21 | Stop reason: HOSPADM

## 2019-10-21 RX ORDER — PROPOFOL 10 MG/ML
INJECTION, EMULSION INTRAVENOUS AS NEEDED
Status: DISCONTINUED | OUTPATIENT
Start: 2019-10-21 | End: 2019-10-21 | Stop reason: HOSPADM

## 2019-10-21 RX ORDER — ATROPINE SULFATE 0.1 MG/ML
0.4 INJECTION INTRAVENOUS
Status: DISCONTINUED | OUTPATIENT
Start: 2019-10-21 | End: 2019-10-21 | Stop reason: HOSPADM

## 2019-10-21 RX ORDER — FLUMAZENIL 0.1 MG/ML
0.2 INJECTION INTRAVENOUS
Status: DISCONTINUED | OUTPATIENT
Start: 2019-10-21 | End: 2019-10-21 | Stop reason: HOSPADM

## 2019-10-21 RX ORDER — SODIUM CHLORIDE 9 MG/ML
INJECTION, SOLUTION INTRAVENOUS
Status: DISCONTINUED | OUTPATIENT
Start: 2019-10-21 | End: 2019-10-21 | Stop reason: HOSPADM

## 2019-10-21 RX ADMIN — PROPOFOL 150 MG: 10 INJECTION, EMULSION INTRAVENOUS at 13:38

## 2019-10-21 RX ADMIN — PROPOFOL 50 MG: 10 INJECTION, EMULSION INTRAVENOUS at 13:46

## 2019-10-21 RX ADMIN — SODIUM CHLORIDE: 9 INJECTION, SOLUTION INTRAVENOUS at 13:35

## 2019-10-21 RX ADMIN — PROPOFOL 50 MG: 10 INJECTION, EMULSION INTRAVENOUS at 13:42

## 2019-10-21 NOTE — PROCEDURES
Zain Cardenas M.D.  [phone]            10/21/2019          Colonoscopy Operative Report  Mellisa Carmen  :  1945  [facility] Medical Record Number:  [id number]      Indications:    Diarrhea, Personal history of colon polyps (screening only)     :  Keary Fothergill, MD    Referring Provider: Deuce Reardon MD    Sedation:  MAC anesthesia    Pre-Procedural Exam:      Airway: clear,  No airway problems anticipated  Heart: RRR, without gallops or rubs  Lungs: clear bilaterally without wheezes, crackles, or rhonchi  Abdomen: soft, nontender, nondistended, bowel sounds present  Mental Status: awake, alert and oriented to person, place and time     Procedure Details:  After informed consent was obtained with all risks and benefits of procedure explained and preoperative exam completed, the patient was taken to the endoscopy suite and placed in the left lateral decubitus position. Upon sequential sedation as per above, a digital rectal exam was performed. The Olympus videocolonoscope  was inserted in the rectum and carefully advanced to the cecum, which was identified by the ileocecal valve and appendiceal orifice, terminal ileum. The quality of preparation was good. The colonoscope was slowly withdrawn with careful inspection and evaluation between folds. Retroflexion in the rectum was performed. Findings:   Terminal Ileum: normal  Cecum: normal  Ascending Colon: normal  Transverse Colon: 1  Sessile polyp(s), the largest 2 mm in size; Descending Colon: normal  Sigmoid: no mucosal lesion appreciated  mild diverticulosis; Rectum: no mucosal lesion appreciated  Grade 1 internal hemorrhoid(s);      Interventions:  1 complete polypectomy were performed using cold biopsy forceps and the polyps were  retrieved    Specimen Removed:  specimen #2, 2 mm in size, located in the transverse colon removed by cold biopsy and sent for pathology Specimen #1, random colon biopsies    Complications: None. EBL:  None. Impression:  One diminutive polyp removed and sent to pathology, otherwise mucosa within normal throughout the colon and terminal ileum. Mild sigmoid diverticulosis and small internal hemorrhoids. Recommendations:  -If adenoma is present, repeat colonoscopy in 5 years.   -Await pathology from random colon biopsies to rule out microscopic colitis  -Low fat diet.    -Resume normal medication(s). Discharge Disposition:  Home in the company of a  when able to ambulate.     Saskia Mann MD  10/21/2019  1:52 PM

## 2019-10-21 NOTE — PROGRESS NOTES
Brandon Serrato  1945  [id number]    Situation:  Verbal report received from:   Dann Bynum RN   Procedure: Procedure(s):  COLONOSCOPY- Check for device orders  COLON BIOPSY  ENDOSCOPIC POLYPECTOMY    Background:    Preoperative diagnosis: DIARRHEA  Postoperative diagnosis: Diverticulosis  Colon Polyp  Hemorrhoids    :  Dr. Tami Corea   Assistant(s): Endoscopy Technician-1: Aris Rubin  Endoscopy RN-1: Krish Thomas RN    Specimens:   ID Type Source Tests Collected by Time Destination   1 : Random Colon Biopsies Preservative   Baltazar Gonzales MD 10/21/2019 1343 Pathology   2 : Transverse Colon Polyp Preservative   Baltazar Gonzales MD 10/21/2019 1346 Pathology     H. Pylori  no    Assessment:  Intra-procedure medications        Anesthesia gave intra-procedure sedation and medications, see anesthesia flow sheet yes    Intravenous fluids: NS@ KVO     Vital signs stable   yes    Abdominal assessment: round and soft   yes    Recommendation:  Discharge patient per MD order  yes.   Return to floor  outpatient  Family or Friend spouse  Permission to share finding with family or friend yes

## 2019-10-21 NOTE — PERIOP NOTES
Report from [facility], CRNA, see anesthesia record. ABD remains soft and non-tender post procedure. Pt has no complaints at this time and tolerated the procedure well. Endoscope was pre-cleaned at bedside immediately following procedure by Hillary Amaya.

## 2019-10-21 NOTE — DISCHARGE INSTRUCTIONS
[address] [facility]. Medhat Gant M.D.  [phone]            COLON DISCHARGE INSTRUCTIONS       10/21/2019    Monica Goodrich  :  1945  [facility] Medical Record Number:  [id number]      COLONOSCOPY FINDINGS:  Your colonoscopy showed one diminutive polyp that was removed and sent to pathology, otherwise mucosa within normal, biopsies obtained to rule out microscopic colitis. DISCOMFORT:  Redness at IV site- apply warm compress to area; if redness or soreness persist- contact your physician  There may be a slight amount of blood passed from the rectum  Gaseous discomfort- walking, belching will help relieve any discomfort  You may not operate a vehicle for 12 hours  You may not engage in an occupation involving machinery or appliances for rest of today  You may not drink alcoholic beverages for at least 12 hours  Avoid making any critical decisions for at least 24 hour  DIET:   Low fat diet. - however -  remember your colon is empty and a heavy meal will produce gas. Avoid these foods:  vegetables, fried / greasy foods, carbonated drinks for today     ACTIVITY:  You may resume your normal daily activities it is recommended that you spend the remainder of the day resting -  avoid any strenuous activity. CALL M.D. ANY SIGN OF:   Increasing pain, nausea, vomiting  Abdominal distension (swelling)  New increased bleeding (oral or rectal)  Fever (chills)  Pain in chest area  Bloody discharge from nose or mouth   Shortness of breath    Follow-up Instructions:   Call Dr. Tien Doan if any questions or problems. Telephone # [phone]  Biopsy results will be available in  5 to 7 days  Should have a repeat colonoscopy in 5 years. Will call you biopsy results once they become available. Resume Pradaxa tomorrow.

## 2019-10-21 NOTE — ANESTHESIA PREPROCEDURE EVALUATION
Anesthetic History   No history of anesthetic complications            Review of Systems / Medical History  Patient summary reviewed    Pulmonary  Within defined limits                 Neuro/Psych       CVA      Comments: CVA affected left eye only Cardiovascular            Dysrhythmias   Pacemaker    Exercise tolerance: >4 METS  Comments: Underlying rhythm is a-fib with megan-tachy syndrome, corrected with pacemaker   GI/Hepatic/Renal     GERD           Endo/Other  Within defined limits           Other Findings              Physical Exam    Airway  Mallampati: II  TM Distance: 4 - 6 cm  Neck ROM: normal range of motion   Mouth opening: Normal     Cardiovascular    Rhythm: regular  Rate: normal         Dental  No notable dental hx       Pulmonary  Breath sounds clear to auscultation               Abdominal         Other Findings            Anesthetic Plan    ASA: 3  Anesthesia type: MAC            Anesthetic plan and risks discussed with: Patient

## 2019-10-21 NOTE — H&P
The patient is a 66year old male who presents with a complaint of Abdominal Pain. The patient presents for new symptoms (FOr the past month he has been having intermittent dull ache in the LLQ along with episodes of incontinence of stools. Lizabeth Syed had been having up to 6 bowel movements per day but this has decreased since he added fiber supplement.  He denies fevers weight loss, nausea, and vomiting.). The abdominal pain has been occurring in an intermittent pattern with a decreasing course . The abdominal pain is described as mild dull ache and  is described as being located in the left lower quadrant .  The abdominal pain does not radiate. The symptoms are aggravated by nothing. There has been no associated bloody stools, constipation or weight loss. Problem List/Past Medical ([facility]; 9/4/2019 9:48 AM)  Atrial Fibrillation    Reflux    Arthritis    Pacemaker    Polyps    Stroke  [03/2014]: History of colonic polyps (V12.72  Z86.010)    Long term current use of anticoagulant (V58.61  Z79.01)      Past Surgical History ([facility]; 9/4/2019 9:48 AM)  Pacemaker inserted    REPAIR, KNEE, ACL (46707)   Right. Polypectomy      Allergies ([facility]; 9/4/2019 9:48 AM)  Sulfa Drugs   Rash. Penicillins   Rash. Clindamycin HCl *ANTI-INFECTIVE AGENTS - MISC. *    Amoxicillin *PENICILLINS*      Medication History ([facility]; 9/4/2019 9:48 AM)  Atorvastatin Calcium  (40MG Tablet, Oral daily) Active. Omeprazole  (20MG Tablet DR, Oral daily) Active. Lipitor  (40MG Tablet, Oral daily) Active. Rapaflo  (4MG Capsule, Oral qd) Active. Pradaxa  (150MG Capsule, Oral two times daily) Active. Allergy Medication  (25MG Capsule, Oral daily) Active. Glucosamine Chondr 500 Complex  (Oral two times daily) Active. Aspirin  (81MG Tablet, Oral daily) Active. Fish Oil  (2 Oral daily) Specific strength unknown - Active. Flomax  (0.4MG Capsule, Oral daily) Active.   Metoprolol Succinate  (50MG Tablet ER 24HR, Oral daily) Active. Medications Reconciled     Family History ([facility]; 9/4/2019 9:48 AM)  Stroke   Mother. passed  Diabetes Mellitus   sibling    Social History ([facility]; 9/4/2019 9:48 AM)  No alcohol use    Blood Transfusion   No.  Non smoker / no tobacco use    Employment status   Retired. Marital status   . Diagnostic Studies History ([facility]; 9/4/2019 9:48 AM)  Colonoscopy  [2008]: Health Maintenance History ([facility]; 9/4/2019 9:48 AM)  Flu Vaccine  [2018]:  Pneumovax  [2018]:        Review of Systems ([facility]; 9/4/2019 9:48 AM)  General Not Present- Chronic Fatigue, Poor Appetite, Weight Gain and Weight Loss. Skin Not Present- Itching, Rash and Skin Color Changes. HEENT Not Present- Hearing Loss and Vertigo. Respiratory Not Present- Difficulty Breathing and TB exposure. Cardiovascular Not Present- Chest Pain, Use of Antibiotics before Dental Procedures and Use of Blood Thinners. Gastrointestinal Present- See HPI. Musculoskeletal Not Present- Arthritis, Hip Replacement Surgery and Knee Replacement Surgery. Neurological Not Present- Weakness. Psychiatric Not Present- Depression. Endocrine Not Present- Diabetes and Thyroid Problems. Hematology Not Present- Anemia. Vitals ([facility]; 9/4/2019 9:51 AM)  9/4/2019 9:48 AM  Weight: 228 lb   Height: 74 in   Body Surface Area: 2.3 m²   Body Mass Index: 29.27 kg/m²    BP: 120/70 (Sitting, Left Arm, Standard)              Physical Exam ([facility]; 9/4/2019 3:16 PM)  General  Mental Status - Alert. General Appearance - Cooperative, Pleasant, Not in acute distress. Orientation - Oriented X3. Integumentary  General Characteristics  Color - normal coloration of skin. Head and Neck  Neck  Global Assessment - supple. Eye  Sclera/Conjunctiva - Bilateral - No Jaundice.     Chest and Lung Exam  Chest and lung exam reveals  - quiet, even and easy respiratory effort with no use of accessory muscles. Auscultation  Breath sounds - Normal. Adventitious sounds - No Adventitious sounds. Cardiovascular  Auscultation  Rhythm - Regular, No Tachycardia, No Bradycardia . Heart Sounds - Normal heart sounds , S1 WNL and S2 WNL, No S3, No Summation Gallop. Murmurs & Other Heart Sounds - Auscultation of the heart reveals - No Murmurs. Abdomen  Palpation/Percussion  Tenderness - Non-Tender. Rebound tenderness - No rebound. Rigidity (guarding) - No Rigidity. Dullness to percussion - No abnormal dullness to percussion. Liver - No hepatosplenomegaly. Abdominal Mass Palpable - No masses. Other Characteristics - No Ascites. Auscultation  Auscultation of the abdomen reveals - Bowel sounds normal, No Abdominal bruits and No Succussion splash. Rectal - Did not examine. Peripheral Vascular  Lower Extremity  Palpation - Edema - Left - No edema. Right - No edema. Neurologic  Motor  Involuntary Movements - Asterixis - not present. Assessment & Plan ([facility]; 9/4/2019 3:37 PM)  Diarrhea (787.91  R19.7)  Impression: Concerning for Infectious Etiologies, Microscopic Colitis, or IBD. Recommended he continue with doyle fiber supplement. Will check stool tests to evalate for infectious etiologies, and proceed with colonoscopy with biopsies to rule out microscopic colitis. Started Dicyclomine to help with intermittent pain. I have asked him to call if his diarrhea worsens and will consider colestipol as next step. Follow up in office after procedure. Current Plans  ASSAY, ELASTASE, PANCREATIC, FECAL (25385)  Culture, Stool (99027)  OVA & PARASITE DIR SMEAR (45547)  LEUKOCYTE COUNT, FECAL (21179)  Clostridium difficile Toxin A+B, EIA (50548)  Started Dicyclomine HCl 10MG, 1 (one) Capsule three times daily, as needed, Mail Order #270, 90 days starting 09/04/2019, No Refill.   Screening for colon cancer (V76.51  Z12.11)  Impression: Due for screening colonoscopy, will obtain random biopsies to rule out microscopic colitis. Current Plans  Colonoscopy (20305) (Discussed risks and benefits with the patient to include:; perforation, post polypectomy, or post biopsy bleeding, missed lesions, and sedation reactions.)  Started Suprep Bowel Prep Kit 17.5-3.13-1.6GM/177ML, 1 (one) kit container Milliliter as directed, 1 Milliliter, 09/04/2019, No Refill. Pt Education - How to access health information online: discussed with patient and provided information. Medical Decision Making ([facility] 9/4/2019 3:41 PM)  Amount/complexity of data to be reviewed:  - Order and/or review of lab test(s)  - Review and summarization of old records      Signed electronically by LAILA Gaffney (9/4/2019 3:41 PM)        Co-signed by Nicki Malik MD (9/4/2019 9:37 PM)

## 2019-10-21 NOTE — ANESTHESIA POSTPROCEDURE EVALUATION
Procedure(s):  COLONOSCOPY- Check for device orders  COLON BIOPSY  ENDOSCOPIC POLYPECTOMY. MAC    Anesthesia Post Evaluation      Multimodal analgesia: multimodal analgesia not used between 6 hours prior to anesthesia start to PACU discharge  Patient location during evaluation: PACU  Patient participation: complete - patient participated  Level of consciousness: awake  Pain management: adequate  Airway patency: patent  Anesthetic complications: no  Cardiovascular status: acceptable, blood pressure returned to baseline and hemodynamically stable  Respiratory status: acceptable  Hydration status: acceptable  Post anesthesia nausea and vomiting:  controlled      Vitals Value Taken Time   BP 91/46 10/21/2019  1:59 PM   Temp     Pulse 125 10/21/2019  2:03 PM   Resp 22 10/21/2019  2:03 PM   SpO2 97 % 10/21/2019  2:03 PM   Vitals shown include unvalidated device data.

## 2019-10-24 LAB
GLIADIN PEPTIDE IGA SER-ACNC: 3 UNITS (ref 0–19)
GLIADIN PEPTIDE IGG SER-ACNC: 3 UNITS (ref 0–19)
IGA SERPL-MCNC: 210 MG/DL (ref 61–437)
TTG IGA SER-ACNC: <2 U/ML (ref 0–3)
TTG IGG SER-ACNC: 10 U/ML (ref 0–5)

## 2020-01-16 NOTE — PROGRESS NOTES
Aaronyann ZAMORAWilly [place] [address], [address]  [address]  [place], [address] [facility]    Office [phone]  Fax [phone]  Cell [phone]       Jyotsna Virk is a 66 y.o. male. Last seen by me 6 months ago. DIAGNOSES  Encounter Diagnoses     ICD-10-CM ICD-9-CM   1. Other chest pain R07.89 786.59   2. Chronic atrial fibrillation I48.20 427.31   3. Bilateral carotid artery stenosis I65.23 433.10     433.30   4. Dyslipidemia E78.5 272.4   5. Cardiac pacemaker in situ Z95.0 V45.01   6. Elevated Lp(a) E78.41 272.8   7. Chronic fatigue R53.82 780.79       ASSESSMENT/PLAN    Chronic AF complicated by conduction disease s/p pacemaker 2009 with generator change 2017. He is not pacer dependent.   - Continue Metoprolol plus Pradaxa. Exertional fatigue coupled with intermittent chest sensations. - Evaluate further with echo and exercise Cardiolite - would not hold Toprol. Mild carotid disease, unchanged by duplex study today. , going back to at least 2012. Recheck in 2-3 years. Recent fall with limited chin bruising. Phone follow up after reviewing tests       HPI    Jyotsna Virk reports he feel last week from slipping on ice while walking his dog. He has some bruising on his chin. Otherwise, he is feeling fine. He notes recent daytime fatigue with afternoon naps. He notes easy fatiguability. Some CONTRERAS at times. Reports intermittent chest sensations, somewhat random. Patient denies any exertional chest pain,, syncope, orthopnea, edema or paroxysmal nocturnal dyspnea. He has had no bleeding concerns. He continues to get remote pacer checks. He has had 2 shingles shot in the past 2 months.      His DM is followed by Jhonatan Estes MD.     Cardiac risk factors   HTN no  DM no  Smoking no  Family Hx, mother with stroke, father and brother with HTN    Cardiac testing  ETT April 2012 - 8 min, resting HR 81, peak   Carotid duplex 8/17/2012 - 10-49% bilateral  Echo 3/4/13: EF 55-60%; LA: moderately dilated; MV and TV: mild regurg.; no sig. hange from previous echo  3/2013 - abnormal overnight oximetry, normal sleep study  KACEY 4/17/2014 - no intracardiac mass/thrombus or shunt, mild to moderate aortic arch plaque, EF 50%, moderate LAE, mild MR/AR. Carotid duplex 4/15/14 - 10-49% plaque bilateral, unchanged  Carotid duplex 6/8/15: 10-49% bilateral stenosis; unchanged. Echo 12/21/15 - EF 65 %. No WMA. Moderate WYATT. Mild TR. Mild pulmonary HTN with PASP 38mmHg. Carotid duplex 6/24/16 - 10-49% bilaterally, unchanged    3/28/17- SJM pacemaker generator change per Dr. Courtney Houston    Carotid duplex 4/18/17 - 10-49% bilaterally, unchanged. Carotid duplex 7/18/19 - 10-49% bilateral, unchanged from 4/18/17     Current Outpatient Medications   Medication Sig Dispense Refill    metoprolol succinate (TOPROL-XL) 50 mg XL tablet Take 1.5 Tabs by mouth daily. 135 Tab 1    atorvastatin (LIPITOR) 40 mg tablet Take 1 Tab by mouth daily. 90 Tab 3    omeprazole (PRILOSEC) 20 mg capsule TAKE 1 CAPSULE BY MOUTH DAILY 90 Cap 3    PRADAXA 150 mg capsule TAKE 1 CAPSULE EVERY 12 HOURS 180 Cap 3    CIALIS 5 mg tablet       psyllium (METAMUCIL) packet Take 1 Packet by mouth.  KRILL OIL PO Take 1 Cap by mouth daily.  VOLTAREN 1 % gel as needed.  B.infantis-B.ani-B.long-B.bifi (PROBIOTIC 4X) 10-15 mg TbEC Take 1 Cap by mouth daily.  melatonin 5 mg cap capsule Take 5 mg by mouth nightly as needed.  acetaminophen (TYLENOL EXTRA STRENGTH) 500 mg tablet Take 2 tablets by mouth three (3) times daily as needed for Pain.  silodosin (RAPAFLO) 8 mg capsule Take 8 mg by mouth daily (with breakfast).  GLUCOSAMINE HCL/CHONDRO DONALDSON A (GLUCOSAMINE-CHONDROITIN PO) Take  by mouth daily.  aspirin delayed-release 81 mg tablet Take 81 mg by mouth daily.  cholecalciferol, vitamin d3, (VITAMIN D) 1,000 unit tablet Take  by mouth daily.  CLARITIN 10 mg Tab take 10 mg by mouth daily. Allergies   Allergen Reactions    Other Plant, Animal, Environmental Anaphylaxis     Entire body edema with fire ants    Amoxicillin Rash    Clindamycin Hives and Rash    Pcn [Penicillins] Rash     States he is able to tolerate cephalexin with no adverse reaction    Sulfa (Sulfonamide Antibiotics) Rash    Venom-Honey Bee Rash     Yellowjackets       Review of Systems   Constitutional: Negative for fever, chills, weight loss, and diaphoresis. +fatigue  Respiratory: Negative for cough, hemoptysis, sputum production, shortness of breath and wheezing. Cardiovascular: Negative for chest pain, palpitations, orthopnea, claudication, leg swelling and PND. Gastrointestinal: Negative for nausea, heartburn, vomiting, blood in stool and melena. Genitourinary: Negative for dysuria, hematuria and flank pain. Neurological: Negative for speech change, focal weakness, seizures, loss of consciousness, weakness and headaches. +imbalance issues, tinnitus   Endo/Heme/Allergies: Does not bruise/bleed easily. Psychiatric/Behavioral: Negative for memory loss. The patient does not have insomnia. Visit Vitals  /76 (BP 1 Location: Left arm, BP Patient Position: Sitting)   Pulse 65   Resp 16   Ht 6' 2\" (1.88 m)   Wt 217 lb (98.4 kg)   SpO2 94%   BMI 27.86 kg/m²        Wt Readings from Last 3 Encounters:   01/17/20 217 lb (98.4 kg)   10/21/19 216 lb 0.8 oz (98 kg)   07/18/19 222 lb (100.7 kg)     Physical Exam   Vitals reviewed. Constitutional: He is oriented to person, place, and time. He appears well-developed. Head: Normocephalic. Neck: Neck supple. No JVD present. No tracheal deviation present. Cardiovascular: Irregular rhythm and normal heart sounds. Exam reveals no gallop and no friction rub. No murmur heard. Pulmonary/Chest: Effort normal and breath sounds normal. He has no wheezes. He has no rales. Abdominal: Soft. Bowel sounds are normal. No tenderness. Musculoskeletal: He exhibits no edema. Neurological: He is alert and oriented to person, place, and time. Skin: Skin is warm and dry. Psychiatric: He has a normal mood and affect. Results for orders placed or performed in visit on 10/24/17   NMR LIPOPROFILE     Status: None   Result Value Ref Range Status    LDL-P 549 <1,000 nmol/L Final     Comment:                           Low                   < 1000                            Moderate         1000 - 1299                            Borderline-High  1300 - 1599                            High             1600 - 2000                            Very High             > 2000      LDL-C 56 0 - 99 mg/dL Final     Comment:                           Optimal               <  100                            Above optimal     100 -  129                            Borderline        130 -  159                            High              160 -  189                            Very high             >  189  LDL-C is inaccurate if patient is non-fasting.       HDL-C 57 >39 mg/dL Final    Triglycerides 104 0 - 149 mg/dL Final    Cholesterol, Total 134 100 - 199 mg/dL Final    HDL-P (Total) 31.8 >=30.5 umol/L Final    Small LDL-P 133 <=527 nmol/L Final    LDL size 21.2 >20.5 nm Final     Comment:  ----------------------------------------------------------                   ** INTERPRETATIVE INFORMATION**                   PARTICLE CONCENTRATION AND SIZE                      <--Lower CVD Risk   Higher CVD Risk-->    LDL AND HDL PARTICLES   Percentile in Reference Population    HDL-P (total)        High     75th    50th    25th   Low                         >34.9    34.9    30.5    26.7   <26.7    Small LDL-P          Low      25th    50th    75th   High                         <117     117     527     839    >839    LDL Size   <-Large (Pattern A)->    <-Small (Pattern B)->                      23.0    20.6           20.5      19.0   ----------------------------------------------------------  Small LDL-P and LDL Size are associated with CVD risk, but not after  LDL-P is taken into account. These assays were developed and their performance characteristics  determined by [employer]. These assays have not been cleared by the  [employer] and Drug Administration. The clinical utility o  f these  laboratory values have not been fully established. LP-IR SCORE 29 <=45 Final     Comment: INSULIN RESISTANCE MARKER      <--Insulin Sensitive    Insulin Resistant-->             Percentile in Reference Population  Insulin Resistance Score  LP-IR Score   Low   25th   50th   75th   High                <27   27     45     63     >63  LP-IR Score is inaccurate if patient is non-fasting. The LP-IR score is a laboratory developed index that has been  associated with insulin resistance and diabetes risk and should be  used as one component of a physician's clinical assessment. The  LP-IR score listed above has not been cleared by the [employer] and  Drug Administration. Narrative    Performed at:  [address]  [id number]  : Michelle Wilson MD, Phone:  [phone]     Lab Results   Component Value Date/Time    Sodium 143 02/15/2019 12:20 PM    Potassium 4.7 02/15/2019 12:20 PM    Chloride 106 02/15/2019 12:20 PM    CO2 25 02/15/2019 12:20 PM    Anion gap 9 11/30/2015 10:00 AM    Glucose 81 02/15/2019 12:20 PM    BUN 13 02/15/2019 12:20 PM    Creatinine 1.01 02/15/2019 12:20 PM    BUN/Creatinine ratio 13 02/15/2019 12:20 PM    GFR est AA 84 02/15/2019 12:20 PM    GFR est non-AA 72 02/15/2019 12:20 PM    Calcium 8.9 02/15/2019 12:20 PM    Bilirubin, total 1.2 02/15/2019 12:20 PM    AST (SGOT) 25 02/15/2019 12:20 PM    Alk.  phosphatase 96 02/15/2019 12:20 PM    Protein, total 6.3 02/15/2019 12:20 PM    Albumin 4.0 02/15/2019 12:20 PM    Globulin 2.9 05/04/2010 10:00 AM    A-G Ratio 1.7 02/15/2019 12:20 PM    ALT (SGPT) 16 02/15/2019 12:20 PM     Cardiographics  Pacer function 3/06/14 - normal  EKG 3/06/14 - Ventricular paced, underlying AF  EKG 12/14/15 - V paced, underlying AF  EKG 6/24/16 - V paced, underlying AF  Carotid duplex 6/24/16 - 10-49% bilaterally, unchanged  EKG 1/18/19 - Afib 60's with ventricular demand pacing   Carotid duplex 7/18/19 - 10-49% bilateral, unchanged from 4/18/17         Written by Manuela Maradiaga, as dictated by Josh Jordan MD.      Faisal Khan MD

## 2020-01-17 ENCOUNTER — OFFICE VISIT (OUTPATIENT)
Dept: CARDIOLOGY CLINIC | Age: 75
End: 2020-01-17

## 2020-01-17 VITALS
HEART RATE: 65 BPM | WEIGHT: 217 LBS | HEIGHT: 74 IN | OXYGEN SATURATION: 94 % | SYSTOLIC BLOOD PRESSURE: 126 MMHG | RESPIRATION RATE: 16 BRPM | DIASTOLIC BLOOD PRESSURE: 76 MMHG | BODY MASS INDEX: 27.85 KG/M2

## 2020-01-17 DIAGNOSIS — E78.5 DYSLIPIDEMIA: ICD-10-CM

## 2020-01-17 DIAGNOSIS — R07.89 OTHER CHEST PAIN: Primary | ICD-10-CM

## 2020-01-17 DIAGNOSIS — I65.23 BILATERAL CAROTID ARTERY STENOSIS: ICD-10-CM

## 2020-01-17 DIAGNOSIS — Z95.0 CARDIAC PACEMAKER IN SITU: ICD-10-CM

## 2020-01-17 DIAGNOSIS — E78.41 ELEVATED LP(A): ICD-10-CM

## 2020-01-17 DIAGNOSIS — I48.20 CHRONIC ATRIAL FIBRILLATION (HCC): ICD-10-CM

## 2020-01-17 DIAGNOSIS — R53.82 CHRONIC FATIGUE: ICD-10-CM

## 2020-01-17 NOTE — PROGRESS NOTES
Chief Complaint   Patient presents with    Irregular Heart Beat     a fib    Cholesterol Problem    Follow-up     6 month     Visit Vitals  /76 (BP 1 Location: Left arm, BP Patient Position: Sitting)   Pulse 65   Resp 16   Ht 6' 2\" (1.88 m)   Wt 217 lb (98.4 kg)   SpO2 94%   BMI 27.86 kg/m²     Patient presents to office for follow up. He recently had a fall last week, facial bruising noted. He said he slipped and was not dizzy. Denies SOB, edema, or CP. He said recently he has been more tired than usual, takes more naps. Also reports hes been more aware of his a-fib within the last several weeks, states he can \"feel it\" but still denies pain. No recent hospitalizations. He has had a colonoscopy end of 2019. Need refills on pradaxa and lopressor today.    Samuel Branch LPN

## 2020-01-17 NOTE — LETTER
1/18/20 Patient: Daniel Gomez YOB: 1945 Date of Visit: 1/17/2020 Brett Kothari MD 
[place] [address] [address] [facility] [id number] VIA In Basket Dear Brett Kothari MD, Thank you for referring Mr. Nikhil Jack to [address] for evaluation. My notes for this consultation are attached. If you have questions, please do not hesitate to call me. I look forward to following your patient along with you. Sincerely, Danika Cook MD

## 2020-01-18 PROBLEM — R53.82 CHRONIC FATIGUE: Status: ACTIVE | Noted: 2020-01-18

## 2020-01-18 PROBLEM — R07.89 OTHER CHEST PAIN: Status: ACTIVE | Noted: 2020-01-18

## 2020-01-21 DIAGNOSIS — I48.20 CHRONIC ATRIAL FIBRILLATION (HCC): ICD-10-CM

## 2020-01-21 RX ORDER — DABIGATRAN ETEXILATE 150 MG/1
150 CAPSULE ORAL 2 TIMES DAILY
Qty: 180 CAP | Refills: 3 | Status: SHIPPED | OUTPATIENT
Start: 2020-01-21 | End: 2021-03-01

## 2020-01-21 RX ORDER — METOPROLOL SUCCINATE 50 MG/1
75 TABLET, EXTENDED RELEASE ORAL DAILY
Qty: 135 TAB | Refills: 1 | Status: SHIPPED | OUTPATIENT
Start: 2020-01-21 | End: 2020-04-02

## 2020-01-21 NOTE — TELEPHONE ENCOUNTER
Refill approved VO   Requested Prescriptions     Pending Prescriptions Disp Refills    dabigatran etexilate (PRADAXA) 150 mg capsule 180 Cap 3     Sig: Take 1 Cap by mouth two (2) times a day.  metoprolol succinate (TOPROL-XL) 50 mg XL tablet 135 Tab 1     Sig: Take 1.5 Tabs by mouth daily.

## 2020-02-05 ENCOUNTER — TELEPHONE (OUTPATIENT)
Dept: CARDIOLOGY CLINIC | Age: 75
End: 2020-02-05

## 2020-02-05 NOTE — TELEPHONE ENCOUNTER
----- Message from Wolfgang Bhandari NP sent at 2/5/2020  3:45 PM EST -----  Please let Mr. Elias Landa know that his Echo and stress test were normal.      Continue current medications and follow up again in 6 months.    ----- Message -----  From: Yohana Keen MD  Sent: 2/5/2020   1:32 PM EST  To: Robert Albert MD

## 2020-02-05 NOTE — TELEPHONE ENCOUNTER
PIDX2 notified of ECHO and Stress test per Tayla NP note.  Patient verbalized understanding and had no furtherquestions

## 2020-02-11 ENCOUNTER — OFFICE VISIT (OUTPATIENT)
Dept: CARDIOLOGY CLINIC | Age: 75
End: 2020-02-11

## 2020-02-11 DIAGNOSIS — Z95.0 CARDIAC PACEMAKER IN SITU: Primary | ICD-10-CM

## 2020-02-18 ENCOUNTER — OFFICE VISIT (OUTPATIENT)
Dept: INTERNAL MEDICINE CLINIC | Age: 75
End: 2020-02-18

## 2020-02-18 ENCOUNTER — HOSPITAL ENCOUNTER (OUTPATIENT)
Dept: LAB | Age: 75
Discharge: HOME | End: 2020-02-18

## 2020-02-18 VITALS
DIASTOLIC BLOOD PRESSURE: 78 MMHG | SYSTOLIC BLOOD PRESSURE: 124 MMHG | WEIGHT: 220 LBS | TEMPERATURE: 97.8 F | HEART RATE: 66 BPM | RESPIRATION RATE: 18 BRPM | OXYGEN SATURATION: 98 % | HEIGHT: 74 IN | BODY MASS INDEX: 28.23 KG/M2

## 2020-02-18 DIAGNOSIS — N40.0 BENIGN PROSTATIC HYPERPLASIA, UNSPECIFIED WHETHER LOWER URINARY TRACT SYMPTOMS PRESENT: ICD-10-CM

## 2020-02-18 DIAGNOSIS — E78.5 DYSLIPIDEMIA: ICD-10-CM

## 2020-02-18 DIAGNOSIS — Z13.31 SCREENING FOR DEPRESSION: ICD-10-CM

## 2020-02-18 DIAGNOSIS — I48.20 CHRONIC ATRIAL FIBRILLATION (HCC): ICD-10-CM

## 2020-02-18 DIAGNOSIS — D69.6 THROMBOCYTOPENIA (HCC): ICD-10-CM

## 2020-02-18 DIAGNOSIS — R53.83 FATIGUE, UNSPECIFIED TYPE: ICD-10-CM

## 2020-02-18 DIAGNOSIS — Z71.89 ADVANCED DIRECTIVES, COUNSELING/DISCUSSION: ICD-10-CM

## 2020-02-18 DIAGNOSIS — J30.9 ALLERGIC RHINITIS, UNSPECIFIED SEASONALITY, UNSPECIFIED TRIGGER: ICD-10-CM

## 2020-02-18 DIAGNOSIS — I49.5 BRADY-TACHY SYNDROME (HCC): ICD-10-CM

## 2020-02-18 DIAGNOSIS — Z00.00 MEDICARE ANNUAL WELLNESS VISIT, SUBSEQUENT: Primary | ICD-10-CM

## 2020-02-18 DIAGNOSIS — K58.0 IRRITABLE BOWEL SYNDROME WITH DIARRHEA: ICD-10-CM

## 2020-02-18 DIAGNOSIS — I77.9 BILATERAL CAROTID ARTERY DISEASE, UNSPECIFIED TYPE (HCC): ICD-10-CM

## 2020-02-18 LAB
ALBUMIN SERPL-MCNC: 3.9 G/DL (ref 3.5–5)
ALBUMIN/GLOB SERPL: 1.3 {RATIO} (ref 1.1–2.2)
ALP SERPL-CCNC: 110 U/L (ref 45–117)
ALT SERPL-CCNC: 24 U/L (ref 12–78)
ANION GAP SERPL CALC-SCNC: 4 MMOL/L (ref 5–15)
AST SERPL-CCNC: 31 U/L (ref 15–37)
BASOPHILS # BLD: 0.1 K/UL (ref 0–0.1)
BASOPHILS NFR BLD: 1 % (ref 0–1)
BILIRUB SERPL-MCNC: 1.7 MG/DL (ref 0.2–1)
BUN SERPL-MCNC: 14 MG/DL (ref 6–20)
BUN/CREAT SERPL: 14 (ref 12–20)
CALCIUM SERPL-MCNC: 8.9 MG/DL (ref 8.5–10.1)
CHLORIDE SERPL-SCNC: 108 MMOL/L (ref 97–108)
CHOLEST SERPL-MCNC: 129 MG/DL
CO2 SERPL-SCNC: 28 MMOL/L (ref 21–32)
CREAT SERPL-MCNC: 0.99 MG/DL (ref 0.7–1.3)
DIFFERENTIAL METHOD BLD: ABNORMAL
EOSINOPHIL # BLD: 0 K/UL (ref 0–0.4)
EOSINOPHIL NFR BLD: 1 % (ref 0–7)
ERYTHROCYTE [DISTWIDTH] IN BLOOD BY AUTOMATED COUNT: 13.2 % (ref 11.5–14.5)
GLOBULIN SER CALC-MCNC: 2.9 G/DL (ref 2–4)
GLUCOSE SERPL-MCNC: 81 MG/DL (ref 65–100)
HCT VFR BLD AUTO: 48.3 % (ref 36.6–50.3)
HDLC SERPL-MCNC: 61 MG/DL
HDLC SERPL: 2.1 {RATIO} (ref 0–5)
HGB BLD-MCNC: 15.8 G/DL (ref 12.1–17)
IMM GRANULOCYTES # BLD AUTO: 0 K/UL (ref 0–0.04)
IMM GRANULOCYTES NFR BLD AUTO: 0 % (ref 0–0.5)
LDLC SERPL CALC-MCNC: 56 MG/DL (ref 0–100)
LIPID PROFILE,FLP: NORMAL
LYMPHOCYTES # BLD: 1.5 K/UL (ref 0.8–3.5)
LYMPHOCYTES NFR BLD: 28 % (ref 12–49)
MCH RBC QN AUTO: 30.6 PG (ref 26–34)
MCHC RBC AUTO-ENTMCNC: 32.7 G/DL (ref 30–36.5)
MCV RBC AUTO: 93.6 FL (ref 80–99)
MONOCYTES # BLD: 0.5 K/UL (ref 0–1)
MONOCYTES NFR BLD: 9 % (ref 5–13)
NEUTS SEG # BLD: 3.2 K/UL (ref 1.8–8)
NEUTS SEG NFR BLD: 61 % (ref 32–75)
NRBC # BLD: 0 K/UL (ref 0–0.01)
NRBC BLD-RTO: 0 PER 100 WBC
PLATELET # BLD AUTO: 143 K/UL (ref 150–400)
PMV BLD AUTO: 9.3 FL (ref 8.9–12.9)
POTASSIUM SERPL-SCNC: 4.4 MMOL/L (ref 3.5–5.1)
PROT SERPL-MCNC: 6.8 G/DL (ref 6.4–8.2)
RBC # BLD AUTO: 5.16 M/UL (ref 4.1–5.7)
SODIUM SERPL-SCNC: 140 MMOL/L (ref 136–145)
TRIGL SERPL-MCNC: 60 MG/DL (ref ?–150)
TSH SERPL DL<=0.05 MIU/L-ACNC: 2.92 UIU/ML (ref 0.36–3.74)
VLDLC SERPL CALC-MCNC: 12 MG/DL
WBC # BLD AUTO: 5.3 K/UL (ref 4.1–11.1)

## 2020-02-18 RX ORDER — IPRATROPIUM BROMIDE 21 UG/1
2 SPRAY, METERED NASAL EVERY 12 HOURS
Qty: 30 ML | Refills: 1 | Status: SHIPPED | OUTPATIENT
Start: 2020-02-18 | End: 2020-03-24 | Stop reason: SDUPTHER

## 2020-02-18 RX ORDER — DICYCLOMINE HYDROCHLORIDE 10 MG/1
10 CAPSULE ORAL AS NEEDED
COMMUNITY
End: 2021-03-25 | Stop reason: SDUPTHER

## 2020-02-18 NOTE — PROGRESS NOTES
Phylicia Du is a 66 y.o. male who presents today for Establish Care and Annual Wellness Visit  . He has a history of   Patient Active Problem List   Diagnosis Code    OA (osteoarthritis) of knee M17.10    Skin moles D22.9    Colon polyp K63.5    AR (allergic rhinitis) J30.9    Thyroid nodule E04.1    BPH (benign prostatic hyperplasia) N40.0    Atrial fibrillation (HCC) I48.91    Scoliosis M41.9    Family history of prostate cancer Z80.42    Chronic maxillary sinusitis J32.0    James-tachy syndrome (HCC) I49.5    Dyslipidemia E78.5    Elevated Lp(a) E78.41    Cerebral infarction (HCC) I63.9    Bilateral carotid artery disease, unspecified type (HCC) I73.9    Advanced care planning/counseling discussion Z71.89    Gastroesophageal reflux disease without esophagitis K21.9    Cardiac pacemaker in situ Z95.0    Excess skin of eyelid H02.30    Chronic fatigue R53.82    Other chest pain R07.89    Thrombocytopenia (HCC) D69.6    Irritable bowel syndrome with diarrhea K58.0   . Today patient is here for medicare wellness. Patient notes more runny nose and left nasal drainage. This seems to be worse when doing yard work. Will try nasal ipratropium. Afib/James-tachy syndrome: Patient is on anticoagulation. Patient with pacemaker present as well as a beta-blocker on board. Recently had a negative stress and ECHO. Patient notes ongoing mild fatigue. This seems to be worse on days where he is exercising though his exercise tolerance is stable. .     Hyperlipidemia  Currently he takes 40 mg of atorvastatin  ROS: taking medications as instructed, no medication side effects noted  No new myalgias, no joint pains, no weakness  No TIA's, no chest pain on exertion, no dyspnea on exertion, no swelling of ankles.    Lab Results   Component Value Date/Time    Cholesterol, total 109 02/15/2019 12:20 PM    HDL Cholesterol 49 02/15/2019 12:20 PM    LDL, calculated 52 02/15/2019 12:20 PM    VLDL, calculated 8 02/15/2019 12:20 PM    Triglyceride 42 02/15/2019 12:20 PM    CHOL/HDL Ratio 2.8 05/04/2010 10:00 AM     Thrombocytopenia: Mild thrombocytopenia last year. We will repeat a CBC this year. Diarrhea better with low gluten diet. Work-up for celiac and colonoscopy was all negative. He does take PRN Bentyl. Patient had repeat carotid ultrasound last year which is stable. Health maintenance hx includes:  Exercise: moderately active. Form of exercise: pool aerobic. Diet: generally follows a low fat low cholesterol diet, nonsmoker, alcohol intake none  Social: bowling regularly, does water aerobis. Retired from [employer]. Lives at home with wife,   Two daughters and 6 grandchildren. Cancer screening:    Colon cancer screening:  Last Colonoscopy: 2019 and was normal   Prostate cancer screening: PSA and/or CARLOS: sees urology. Immunizations:     Immunization History   Administered Date(s) Administered    (RETIRED) Pneumococcal Vaccine (Unspecified Type) 10/24/2011    Influenza High Dose Vaccine PF 09/25/2015, 09/30/2016, 10/02/2018    Influenza Vaccine 09/22/2014    Influenza Vaccine (Tri) Adjuvanted 10/15/2018, 09/06/2019    Influenza Vaccine (Whole Virus) 10/15/2012    Influenza Vaccine Split 10/24/2011    Pneumococcal Conjugate (PCV-13) 09/25/2015    Pneumococcal Vaccine (Unspecified Type) 10/15/2012    Tdap 01/11/2016    Zoster Recombinant 10/23/2019, 01/10/2020    Zoster Vaccine, Live 07/28/2012      Immunization status: up to date and documented. ROS  Review of Systems   Constitutional: Positive for malaise/fatigue. Negative for chills, fever and weight loss. HENT: Negative for congestion and sore throat. Eyes: Negative for blurred vision, double vision and photophobia. Respiratory: Negative for cough and shortness of breath. Cardiovascular: Negative for chest pain, palpitations, orthopnea, claudication and leg swelling. Chronic cold feet and hands. Gastrointestinal: Negative for abdominal pain, constipation, diarrhea, heartburn, nausea and vomiting. Genitourinary: Negative for dysuria, frequency, hematuria and urgency. Musculoskeletal: Negative for back pain, joint pain, myalgias and neck pain. Skin: Negative for rash. Neurological: Negative. Negative for headaches. Endo/Heme/Allergies: Does not bruise/bleed easily. Psychiatric/Behavioral: Negative for memory loss and suicidal ideas. Visit Vitals  /78 (BP 1 Location: Left arm, BP Patient Position: Sitting)   Pulse 66   Temp 97.8 °F (36.6 °C) (Oral)   Resp 18   Ht 6' 2\" (1.88 m)   Wt 220 lb (99.8 kg)   SpO2 98%   BMI 28.25 kg/m²       Physical Exam  Constitutional:       Appearance: He is well-developed. He is not diaphoretic. HENT:      Head: Normocephalic and atraumatic. Eyes:      Pupils: Pupils are equal, round, and reactive to light. Cardiovascular:      Rate and Rhythm: Normal rate and regular rhythm. Heart sounds: No murmur. Pulmonary:      Effort: Pulmonary effort is normal. No respiratory distress. Breath sounds: Normal breath sounds. Abdominal:      General: Abdomen is flat. Bowel sounds are normal.      Palpations: Abdomen is soft. Musculoskeletal: Normal range of motion. Skin:     General: Skin is warm and dry. Neurological:      Mental Status: He is alert and oriented to person, place, and time. Psychiatric:         Behavior: Behavior normal.           Current Outpatient Medications   Medication Sig    dicyclomine (BENTYL) 10 mg capsule Take 10 mg by mouth three (3) times daily.  ipratropium (ATROVENT) 0.03 % nasal spray 2 Sprays by Left Nostril route every twelve (12) hours.  dabigatran etexilate (PRADAXA) 150 mg capsule Take 1 Cap by mouth two (2) times a day.  metoprolol succinate (TOPROL-XL) 50 mg XL tablet Take 1.5 Tabs by mouth daily.     atorvastatin (LIPITOR) 40 mg tablet Take 1 Tab by mouth daily.    omeprazole (PRILOSEC) 20 mg capsule TAKE 1 CAPSULE BY MOUTH DAILY    CIALIS 5 mg tablet     psyllium (METAMUCIL) packet Take 1 Packet by mouth.  KRILL OIL PO Take 1 Cap by mouth daily.  VOLTAREN 1 % gel as needed.  B.infantis-B.ani-B.long-B.bifi (PROBIOTIC 4X) 10-15 mg TbEC Take 1 Cap by mouth daily.  melatonin 5 mg cap capsule Take 5 mg by mouth nightly as needed.  acetaminophen (TYLENOL EXTRA STRENGTH) 500 mg tablet Take 2 tablets by mouth three (3) times daily as needed for Pain.  silodosin (RAPAFLO) 8 mg capsule Take 8 mg by mouth daily (with breakfast).  GLUCOSAMINE HCL/CHONDRO DONALDSON A (GLUCOSAMINE-CHONDROITIN PO) Take  by mouth daily.  aspirin delayed-release 81 mg tablet Take 81 mg by mouth daily.  cholecalciferol, vitamin d3, (VITAMIN D) 1,000 unit tablet Take  by mouth daily.  CLARITIN 10 mg Tab take 10 mg by mouth daily. No current facility-administered medications for this visit. Past Medical History:   Diagnosis Date    AR (allergic rhinitis) 2/11/2009    Atrial fibrillation ([facility] [address]     onset 2003, now chronic, CHADS2 1-2. 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 Masterson Lowers HX [address]  06/2017    Right Eye laser treatment     HX VASECTOMY  25-30 yrs ago    STRESS TEST CARDIOLITE  4/2008    11 min., normal perfusion, EF 57%    VAS CAROTID DUPLEX BILATERAL  8/15/11    10-49% bilateral, unchanged from one year prior      Social History     Tobacco Use    Smoking status: Never Smoker    Smokeless tobacco: Never Used   Substance Use Topics    Alcohol use: No     Comment: no       Family History   Problem Relation Age of Onset    Stroke Mother     Cancer Father         prostate    Hypertension Father     Diabetes Brother     Cancer Brother         prostate    Diabetes Brother     Hypertension Brother     Cancer Brother         melanoma    Cancer Daughter         melanoma        Allergies   Allergen Reactions    Other Plant, Animal, Environmental Anaphylaxis     Entire body edema with fire ants    Amoxicillin Rash    Clindamycin Hives and Rash    Pcn [Penicillins] Rash     States he is able to tolerate cephalexin with no adverse reaction    Sulfa (Sulfonamide Antibiotics) Rash    Venom-Honey Bee Rash     Yellowjackets          Assessment/Plan  Diagnoses and all orders for this visit:    1. Medicare annual wellness visit, angelica - Brian Gauthier was counseled on age-appropriate/ guideline-based risk prevention behaviors and screening for a 66y.o. year old   male . We also discussed adjustments in screening based on family history if necessary.    Printed instructions for preventative screening guidelines and healthy behaviors given to patient with after visit summary. 2. Advanced directives, counseling/discussion    3. Screening for depression  -     DEPRESSION SCREEN ANNUAL    4. Benign prostatic hyperplasia, unspecified whether lower urinary tract symptoms present-symptoms stable. Continues to follow with urologist    5. Dyslipidemia-repeat lipids today. -     LIPID PANEL; Future  -     METABOLIC PANEL, COMPREHENSIVE; Future  -     CBC WITH AUTOMATED DIFF; Future    6. Chronic atrial fibrillation  -     LIPID PANEL; Future  -     METABOLIC PANEL, COMPREHENSIVE; Future  -     CBC WITH AUTOMATED DIFF; Future    7. James-tachy syndrome (HCC)-patient with pacemaker present. Patient also on a beta-blocker. 8. Fatigue, unspecified type-negative stress test and echocardiogram.  Will check a thyroid level.  -     TSH 3RD GENERATION; Future    9. Allergic rhinitis, unspecified seasonality, unspecified trigger-   -     ipratropium (ATROVENT) 0.03 % nasal spray; 2 Sprays by Left Nostril route every twelve (12) hours. 10. Irritable bowel syndrome with diarrhea-negative GI work-up. Better with gluten-free diet and as needed Bentyl. 11. Bilateral carotid artery disease, unspecified type (HCC)-stable findings on ultrasound. Thrombocytopenia -mild noted on last year's blood work. We will repeat      Follow-up and Dispositions    · Return in about 1 year (around 2/18/2021). Sudhakar Archuleta MD  2/18/2020    This note was created with the help of speech recognition software Shireen Dorman) and may contain some 'sound alike' errors. This is the Subsequent Medicare Annual Wellness Exam, performed 12 months or more after the Initial AWV or the last Subsequent AWV    I have reviewed the patient's medical history in detail and updated the computerized patient record.      History     Patient Active Problem List   Diagnosis Code    OA (osteoarthritis) of knee M17.10    Skin moles D22.9    Colon polyp K63.5    AR (allergic rhinitis) J30.9  Thyroid nodule E04.1    BPH (benign prostatic hyperplasia) N40.0    Atrial fibrillation (HCC) I48.91    Scoliosis M41.9    Family history of prostate cancer Z80.42    Chronic maxillary sinusitis J32.0    James-tachy syndrome (HCC) I49.5    Dyslipidemia E78.5    Elevated Lp(a) E78.41    Cerebral infarction (HCC) I63.9    Bilateral carotid artery disease, unspecified type (HCC) I73.9    Advanced care planning/counseling discussion Z71.89    Gastroesophageal reflux disease without esophagitis K21.9    Cardiac pacemaker in situ Z95.0    Excess skin of eyelid H02.30    Chronic fatigue R53.82    Other chest pain R07.89    Thrombocytopenia (HCC) D69.6    Irritable bowel syndrome with diarrhea K58.0     Past Medical History:   Diagnosis Date    AR (allergic rhinitis) 2/11/2009    Atrial fibrillation ([address]     onset 2003, now chronic, CHADS2 1-2. 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 Mateo Martini Vania Heimlich [address]  06/2017    Right Eye laser treatment     HX VASECTOMY  25-30 yrs ago    STRESS TEST CARDIOLITE  4/2008    11 min., normal perfusion, EF 57%    VAS CAROTID DUPLEX BILATERAL  8/15/11    10-49% bilateral, unchanged from one year prior     Current Outpatient Medications   Medication Sig Dispense Refill    dicyclomine (BENTYL) 10 mg capsule Take 10 mg by mouth three (3) times daily.  ipratropium (ATROVENT) 0.03 % nasal spray 2 Sprays by Left Nostril route every twelve (12) hours. 30 mL 1    dabigatran etexilate (PRADAXA) 150 mg capsule Take 1 Cap by mouth two (2) times a day. 180 Cap 3    metoprolol succinate (TOPROL-XL) 50 mg XL tablet Take 1.5 Tabs by mouth daily. 135 Tab 1    atorvastatin (LIPITOR) 40 mg tablet Take 1 Tab by mouth daily. 90 Tab 3    omeprazole (PRILOSEC) 20 mg capsule TAKE 1 CAPSULE BY MOUTH DAILY 90 Cap 3    CIALIS 5 mg tablet       psyllium (METAMUCIL) packet Take 1 Packet by mouth.  KRILL OIL PO Take 1 Cap by mouth daily.  VOLTAREN 1 % gel as needed.  B.infantis-B.ani-B.long-B.bifi (PROBIOTIC 4X) 10-15 mg TbEC Take 1 Cap by mouth daily.  melatonin 5 mg cap capsule Take 5 mg by mouth nightly as needed.  acetaminophen (TYLENOL EXTRA STRENGTH) 500 mg tablet Take 2 tablets by mouth three (3) times daily as needed for Pain.  silodosin (RAPAFLO) 8 mg capsule Take 8 mg by mouth daily (with breakfast).  GLUCOSAMINE HCL/CHONDRO DONALDSON A (GLUCOSAMINE-CHONDROITIN PO) Take  by mouth daily.  aspirin delayed-release 81 mg tablet Take 81 mg by mouth daily.  cholecalciferol, vitamin d3, (VITAMIN D) 1,000 unit tablet Take  by mouth daily.  CLARITIN 10 mg Tab take 10 mg by mouth daily.        Allergies   Allergen Reactions    Other Plant, Animal, Environmental Anaphylaxis     Entire body edema with fire ants    Amoxicillin Rash    Clindamycin Hives and Rash    Pcn [Penicillins] Rash     States he is able to tolerate cephalexin with no adverse reaction    Sulfa (Sulfonamide Antibiotics) Rash    Venom-Honey Bee Rash     Yellowjackets         Family History   Problem Relation Age of Onset    Stroke Mother     Cancer Father         prostate    Hypertension Father     Diabetes Brother     Cancer Brother         prostate    Diabetes Brother     Hypertension Brother     Cancer Brother         melanoma    Cancer Daughter         melanoma     Social History     Tobacco Use    Smoking status: Never Smoker    Smokeless tobacco: Never Used   Substance Use Topics    Alcohol use: No     Comment: no        Depression Risk Factor Screening:     3 most recent PHQ Screens 2/18/2020   PHQ Not Done -   Little interest or pleasure in doing things Not at all   Feeling down, depressed, irritable, or hopeless Not at all   Total Score PHQ 2 0   Trouble falling or staying asleep, or sleeping too much Not at all   Feeling tired or having little energy Not at all   Poor appetite, weight loss, or overeating Not at all   Feeling bad about yourself - or that you are a failure or have let yourself or your family down Not at all   Trouble concentrating on things such as school, work, reading, or watching TV Not at all   Moving or speaking so slowly that other people could have noticed; or the opposite being so fidgety that others notice Not at all   Thoughts of being better off dead, or hurting yourself in some way Not at all   PHQ 9 Score 0   How difficult have these problems made it for you to do your work, take care of your home and get along with others Not difficult at all       Alcohol Risk Factor Screening (MALE > 65):    Do you average more 1 drink per night or more than 7 drinks a week: No    In the past three months have you have had more than 4 drinks containing alcohol on one occasion: No      Functional Ability and Level of Safety:   Hearing: Hearing is good. Activities of Daily Living: The home contains: no safety equipment. Patient does total self care    Ambulation: with no difficulty    Fall Risk:  Fall Risk Assessment, last 12 mths 2/18/2020   Able to walk? Yes   Fall in past 12 months? Yes   Fall with injury? Yes   Number of falls in past 12 months 1   Fall Risk Score 2       Abuse Screen:  Patient is not abused    Cognitive Screening   Has your family/caregiver stated any concerns about your memory: no    Patient Care Team   Patient Care Team:  Ronal White MD as PCP - General (Internal Medicine)  Ronal White MD as PCP - [facility]  Reagan Crum MD as Surgeon (Orthopedic Surgery)  Axel Hopkins MD (Urology)  Bethany Fisher MD (Ophthalmology)  Jaiden Jordan MD (Orthopedic Surgery)  Axel Hopkins MD (Urology)  Tiffanie Shaw MD as Physician (Gastroenterology)  Conor Rocha MD as Physician (Cardiology)    Assessment/Plan   Education and counseling provided:  Are appropriate based on today's review and evaluation  End-of-Life planning (with patient's consent)  Pneumococcal Vaccine  Prostate cancer screening tests (PSA, covered annually)  Colorectal cancer screening tests    Diagnoses and all orders for this visit:    1. Medicare annual wellness visit, subsequent    2. Advanced directives, counseling/discussion    3. Screening for depression  -     DEPRESSION SCREEN ANNUAL    4. Benign prostatic hyperplasia, unspecified whether lower urinary tract symptoms present    5. Dyslipidemia  -     LIPID PANEL; Future  -     METABOLIC PANEL, COMPREHENSIVE; Future  -     CBC WITH AUTOMATED DIFF; Future    6. Chronic atrial fibrillation  -     LIPID PANEL; Future  -     METABOLIC PANEL, COMPREHENSIVE; Future  -     CBC WITH AUTOMATED DIFF; Future    7. James-tachy syndrome ([facility] [address]    8.  Fatigue, unspecified type  - TSH 3RD GENERATION; Future    9. Allergic rhinitis, unspecified seasonality, unspecified trigger  -     ipratropium (ATROVENT) 0.03 % nasal spray; 2 Sprays by Left Nostril route every twelve (12) hours. 10. Irritable bowel syndrome with diarrhea    11. Bilateral carotid artery disease, unspecified type ([facility] [address]    12.  Thrombocytopenia [facility])        Health Maintenance Due   Topic Date Due    GLAUCOMA SCREENING Q2Y  05/22/2019    Lipid Screen  02/15/2020    Medicare Yearly Exam  02/16/2020

## 2020-02-18 NOTE — ACP (ADVANCE CARE PLANNING)
Advance Care Planning    Advance Care Planning (ACP) Provider Conversation Snapshot    Date of ACP Conversation: 02/18/20  Persons included in Conversation:  patient  Length of ACP Conversation in minutes:  <16 minutes (Non-Billable)    Authorized Decision Maker (if patient is incapable of making informed decisions):    This person is:   Healthcare Agent/Medical Power of  under Advance Directive            For Patients with Decision Making Capacity:   Values/Goals: Exploration of values, goals, and preferences if recovery is not expected, even with continued medical treatment in the event of:  Imminent death  Severe, permanent brain injury    Conversation Outcomes / Follow-Up Plan:   Recommended communicating the plan and making copies for the healthcare agent, personal physician, and others as appropriate (e.g., health system)

## 2020-02-18 NOTE — PATIENT INSTRUCTIONS
Medicare Wellness Visit, Male The best way to live healthy is to have a lifestyle where you eat a well-balanced diet, exercise regularly, limit alcohol use, and quit all forms of tobacco/nicotine, if applicable. Regular preventive services are another way to keep healthy. Preventive services (vaccines, screening tests, monitoring & exams) can help personalize your care plan, which helps you manage your own care. Screening tests can find health problems at the earliest stages, when they are easiest to treat. [facility] follows the current, evidence-based guidelines published by the [facility] ([facility]) when recommending preventive services for our patients. Because we follow these guidelines, sometimes recommendations change over time as research supports it. (For example, a prostate screening blood test is no longer routinely recommended for men with no symptoms). Of course, you and your doctor may decide to screen more often for some diseases, based on your risk and co-morbidities (chronic disease you are already diagnosed with). Preventive services for you include: - Medicare offers their members a free annual wellness visit, which is time for you and your primary care provider to discuss and plan for your preventive service needs. Take advantage of this benefit every year! 
-All adults over age 72 should receive the recommended pneumonia vaccines. Current USPSTF guidelines recommend a series of two vaccines for the best pneumonia protection.  
-All adults should have a flu vaccine yearly and tetanus vaccine every 10 years. 
-All adults age 48 and older should receive the shingles vaccines (series of two vaccines).       
-All adults age 38-68 who are overweight should have a diabetes screening test once every three years.  
-Other screening tests & preventive services for persons with diabetes include: an eye exam to screen for diabetic retinopathy, a kidney function test, a foot exam, and stricter control over your cholesterol.  
-Cardiovascular screening for adults with routine risk involves an electrocardiogram (ECG) at intervals determined by the provider.  
-Colorectal cancer screening should be done for adults age 54-65 with no increased risk factors for colorectal cancer. There are a number of acceptable methods of screening for this type of cancer. Each test has its own benefits and drawbacks. Discuss with your provider what is most appropriate for you during your annual wellness visit. The different tests include: colonoscopy (considered the best screening method), a fecal occult blood test, a fecal DNA test, and sigmoidoscopy. 
-All adults born between [facility] should be screened once for Hepatitis C. 
-An Abdominal Aortic Aneurysm (AAA) Screening is recommended for men age 73-68 who has ever smoked in their lifetime. Here is a list of your current Health Maintenance items (your personalized list of preventive services) with a due date: 
Health Maintenance Due Topic Date Due  Glaucoma Screening   05/22/2019  Cholesterol Test   02/15/2020 [facility] Annual Well Visit  02/16/2020

## 2020-03-24 DIAGNOSIS — J30.9 ALLERGIC RHINITIS, UNSPECIFIED SEASONALITY, UNSPECIFIED TRIGGER: ICD-10-CM

## 2020-03-24 RX ORDER — IPRATROPIUM BROMIDE 21 UG/1
2 SPRAY, METERED NASAL EVERY 12 HOURS
Qty: 30 ML | Refills: 1 | Status: SHIPPED | OUTPATIENT
Start: 2020-03-24 | End: 2021-02-24

## 2020-03-30 NOTE — TELEPHONE ENCOUNTER
Left message questioning where scripts need to be sent? Also looks like pulmonary follows patient's sleep apnea. Will clarify with Dr. Deng if he will fill script of if he prefer pulmonary to fill.   Will order tessalon. New medication or Refill was escribed to patient's pharmacy as requested. Let him know.

## 2020-04-01 DIAGNOSIS — K44.9 HIATAL HERNIA: ICD-10-CM

## 2020-04-01 RX ORDER — OMEPRAZOLE 20 MG/1
CAPSULE, DELAYED RELEASE ORAL
Qty: 90 CAP | Refills: 3 | Status: SHIPPED | OUTPATIENT
Start: 2020-04-01 | End: 2021-04-17

## 2020-04-02 RX ORDER — METOPROLOL SUCCINATE 50 MG/1
TABLET, EXTENDED RELEASE ORAL
Qty: 135 TAB | Refills: 3 | Status: SHIPPED | OUTPATIENT
Start: 2020-04-02 | End: 2021-01-07

## 2020-04-09 ENCOUNTER — TELEPHONE (OUTPATIENT)
Dept: CARDIOLOGY CLINIC | Age: 75
End: 2020-04-09

## 2020-04-09 NOTE — TELEPHONE ENCOUNTER
Patient is calling to make an annual appointment, he states that he received a letter. He would also also like to schedule a follow up with Dr Perez Villalobos.        Phone: [phone]

## 2020-08-25 NOTE — PROGRESS NOTES
Camila Martínez, [address]  [address],  [address]  [place], [address] [facility]    Office [phone]  Fax [phone]  Cell [phone]       Montrell Nicole is a 66 y.o. male. Last seen by me 7 months ago. DIAGNOSES  Encounter Diagnoses     ICD-10-CM ICD-9-CM   1. Chronic atrial fibrillation (HCC)  I48.20 427.31   2. Bilateral carotid artery stenosis  I65.23 433.10     433.30   3. James-tachy syndrome (HCC)  I49.5 427.81   4. Dyslipidemia  E78.5 272.4   5. Cardiac pacemaker in situ  Z95.0 V45.01   6. Chronic fatigue  R53.82 780.79       ASSESSMENT/PLAN    Chronic AF complicated by conduction disease s/p pacemaker 2009 with generator change 2017. He is 80% V paced, and his histogram is flat. Rate response adjustments were made by Dr. Jim Manrique today. Interestingly, his HR response by stress test Feb 2020 was normal.  - Continue Metoprolol   - Continue Pradaxa for stroke prevention    Exertional fatigue, chronic. I suspect this is d/t deconditioning and weight gain. Stress nuclear study and echo Feb 2020 were normal.  - Evaluate further with echo and exercise Cardiolite - would not hold Toprol. Mild carotid disease, unchanged by duplex study 6 months ago and unchanged going back to at least 2012.   - Recheck in 2-3 years. Weight gain. We discussed decreasing total calories, avoiding simple carbs, and pushing through exercise. He will try better. Follow-up and Dispositions    · Return in about 6 months (around 2/26/2021). HPI    Montrell Nicole reports fatigue in the afternoon but denies any significant problems sleeping at night. Patient denies any exertional chest pain, palpitations, syncope, orthopnea, edema or paroxysmal nocturnal dyspnea. He reports a mostly sedentary lifestyle which he attributes to the pandemic and new onset hip problems. He states that he has gained some weight recently (8-9 lbs). He denies drinking alcohol. He continues to get remote pacer checks. No bleeding issues on Pradaxa. Of note, he enjoys bowling and attends [employer] group regularly. Cardiac risk factors   HTN no  DM no  Smoking no  Family Hx, mother with stroke, father and brother with HTN    Cardiac testing  ETT April 2012 - 8 min, resting HR 81, peak   Carotid duplex 8/17/2012 - 10-49% bilateral  Echo 3/4/13: EF 55-60%; LA: moderately dilated; MV and TV: mild regurg.; no sig. hange from previous echo  3/2013 - abnormal overnight oximetry, normal sleep study  KACEY 4/17/2014 - no intracardiac mass/thrombus or shunt, mild to moderate aortic arch plaque, EF 50%, moderate LAE, mild MR/AR. Carotid duplex 4/15/14 - 10-49% plaque bilateral, unchanged  Carotid duplex 6/8/15: 10-49% bilateral stenosis; unchanged. Echo 12/21/15 - EF 65 %. No WMA. Moderate WYATT. Mild TR. Mild pulmonary HTN with PASP 38mmHg. Carotid duplex 6/24/16 - 10-49% bilaterally, unchanged    3/28/17- SJM pacemaker generator change per Dr. Jim Manrique    Carotid duplex 4/18/17 - 10-49% bilaterally, unchanged. Carotid duplex 7/18/19 - 10-49% bilateral, unchanged from 4/18/17    Echo 2/5/20 - EF 60-65%. No WMA. Mod LAE. Mild dilated RA. Mild AoV sclerosis without AS. Mild AR. Mild TR. No Pulm HTN. Trace MR. Feliciano Cardiolite 2/5/20 - 9:01. Normal perfusion. Current Outpatient Medications   Medication Sig Dispense Refill    Toprol XL 50 mg XL tablet TAKE ONE AND ONE-HALF TABLETS DAILY 135 Tab 3    omeprazole (PRILOSEC) 20 mg capsule TAKE 1 CAPSULE DAILY 90 Cap 3    ipratropium (ATROVENT) 0.03 % nasal spray 2 Sprays by Left Nostril route every twelve (12) hours. 30 mL 1    dicyclomine (BENTYL) 10 mg capsule Take 10 mg by mouth three (3) times daily.  dabigatran etexilate (PRADAXA) 150 mg capsule Take 1 Cap by mouth two (2) times a day. 180 Cap 3    atorvastatin (LIPITOR) 40 mg tablet Take 1 Tab by mouth daily. 90 Tab 3    CIALIS 5 mg tablet       psyllium (METAMUCIL) packet Take 1 Packet by mouth.       KRILL OIL PO Take 1 Cap by mouth daily.  VOLTAREN 1 % gel as needed.  B.infantis-B.ani-B.long-B.bifi (PROBIOTIC 4X) 10-15 mg TbEC Take 1 Cap by mouth daily.  melatonin 5 mg cap capsule Take 5 mg by mouth nightly as needed.  acetaminophen (TYLENOL EXTRA STRENGTH) 500 mg tablet Take 2 tablets by mouth three (3) times daily as needed for Pain.  silodosin (RAPAFLO) 8 mg capsule Take 8 mg by mouth daily (with breakfast).  GLUCOSAMINE HCL/CHONDRO DONALDSON A (GLUCOSAMINE-CHONDROITIN PO) Take  by mouth daily.  aspirin delayed-release 81 mg tablet Take 81 mg by mouth daily.  cholecalciferol, vitamin d3, (VITAMIN D) 1,000 unit tablet Take  by mouth daily.  CLARITIN 10 mg Tab take 10 mg by mouth daily. Allergies   Allergen Reactions    Other Plant, Animal, Environmental Anaphylaxis     Entire body edema with fire ants    Amoxicillin Rash    Clindamycin Hives and Rash    Pcn [Penicillins] Rash     States he is able to tolerate cephalexin with no adverse reaction    Sulfa (Sulfonamide Antibiotics) Rash    Venom-Honey Bee Rash     Yellowjackets       Review of Systems   Constitutional: Negative for fever, chills, weight loss, and diaphoresis. +fatigue  Respiratory: Negative for cough, hemoptysis, sputum production, shortness of breath and wheezing. Cardiovascular: Negative for chest pain, palpitations, orthopnea, claudication, leg swelling and PND. Gastrointestinal: Negative for nausea, heartburn, vomiting, blood in stool and melena. Genitourinary: Negative for dysuria, hematuria and flank pain. Neurological: Negative for speech change, focal weakness, seizures, loss of consciousness, weakness and headaches. +imbalance issues, tinnitus   Endo/Heme/Allergies: Does not bruise/bleed easily. Psychiatric/Behavioral: Negative for memory loss. The patient does not have insomnia.       Visit Vitals  /78 (BP 1 Location: Left arm, BP Patient Position: Sitting)   Pulse 64   Resp 16 Ht 6' 2\" (1.88 m)   Wt 226 lb (102.5 kg)   SpO2 98%   BMI 29.02 kg/m²        Wt Readings from Last 3 Encounters:   08/26/20 226 lb (102.5 kg)   08/26/20 226 lb (102.5 kg)   02/18/20 220 lb (99.8 kg)     Physical Exam   Vitals reviewed. Constitutional: He is oriented to person, place, and time. He appears well-developed. Head: Normocephalic. Neck: Neck supple. No JVD present. No tracheal deviation present. Cardiovascular: Irregular rhythm and normal heart sounds. Exam reveals no gallop and no friction rub. No murmur heard. Pulmonary/Chest: Effort normal and breath sounds normal. He has no wheezes. He has no rales. Abdominal: Soft. Bowel sounds are normal. No tenderness. Musculoskeletal: He exhibits no edema. Neurological: He is alert and oriented to person, place, and time. Skin: Skin is warm and dry. Psychiatric: He has a normal mood and affect. Results for orders placed or performed in visit on 10/24/17   NMR LIPOPROFILE     Status: None   Result Value Ref Range Status    LDL-P 549 <1,000 nmol/L Final     Comment:                           Low                   < 1000                            Moderate         1000 - 1299                            Borderline-High  1300 - 1599                            High             1600 - 2000                            Very High             > 2000      LDL-C 56 0 - 99 mg/dL Final     Comment:                           Optimal               <  100                            Above optimal     100 -  129                            Borderline        130 -  159                            High              160 -  189                            Very high             >  189  LDL-C is inaccurate if patient is non-fasting.       HDL-C 57 >39 mg/dL Final    Triglycerides 104 0 - 149 mg/dL Final    Cholesterol, Total 134 100 - 199 mg/dL Final    HDL-P (Total) 31.8 >=30.5 umol/L Final    Small LDL-P 133 <=527 nmol/L Final    LDL size 21.2 >20.5 nm Final Comment:  ----------------------------------------------------------                   ** INTERPRETATIVE INFORMATION**                   PARTICLE CONCENTRATION AND SIZE                      <--Lower CVD Risk   Higher CVD Risk-->    LDL AND HDL PARTICLES   Percentile in Reference Population    HDL-P (total)        High     75th    50th    25th   Low                         >34.9    34.9    30.5    26.7   <26.7    Small LDL-P          Low      25th    50th    75th   High                         <117     117     527     839    >839    LDL Size   <-Large (Pattern A)->    <-Small (Pattern B)->                      23.0    20.6           20.5      19.0   ----------------------------------------------------------  Small LDL-P and LDL Size are associated with CVD risk, but not after  LDL-P is taken into account. These assays were developed and their performance characteristics  determined by [employer]. These assays have not been cleared by the  [employer] and Drug Administration. The clinical utility o  f these  laboratory values have not been fully established. LP-IR SCORE 29 <=45 Final     Comment: INSULIN RESISTANCE MARKER      <--Insulin Sensitive    Insulin Resistant-->             Percentile in Reference Population  Insulin Resistance Score  LP-IR Score   Low   25th   50th   75th   High                <27   27     45     63     >63  LP-IR Score is inaccurate if patient is non-fasting. The LP-IR score is a laboratory developed index that has been  associated with insulin resistance and diabetes risk and should be  used as one component of a physician's clinical assessment. The  LP-IR score listed above has not been cleared by the [employer] and  Drug Administration.       Narrative    Performed at:  [address]  [id number]  : Rj Blue MD, Phone:  [phone]     Lab Results   Component Value Date/Time    Sodium 140 02/18/2020 03:26 PM    Potassium 4.4 02/18/2020 03:26 PM    Chloride 108 02/18/2020 03:26 PM    CO2 28 02/18/2020 03:26 PM    Anion gap 4 (L) 02/18/2020 03:26 PM    Glucose 81 02/18/2020 03:26 PM    BUN 14 02/18/2020 03:26 PM    Creatinine 0.99 02/18/2020 03:26 PM    BUN/Creatinine ratio 14 02/18/2020 03:26 PM    GFR est AA >60 02/18/2020 03:26 PM    GFR est non-AA >60 02/18/2020 03:26 PM    Calcium 8.9 02/18/2020 03:26 PM    Bilirubin, total 1.7 (H) 02/18/2020 03:26 PM    Alk. phosphatase 110 02/18/2020 03:26 PM    Protein, total 6.8 02/18/2020 03:26 PM    Albumin 3.9 02/18/2020 03:26 PM    Globulin 2.9 02/18/2020 03:26 PM    A-G Ratio 1.3 02/18/2020 03:26 PM    ALT (SGPT) 24 02/18/2020 03:26 PM     Cardiographics  Pacer function 3/06/14 - normal  EKG 3/06/14 - Ventricular paced, underlying AF  EKG 12/14/15 - V paced, underlying AF  EKG 6/24/16 - V paced, underlying AF  Carotid duplex 6/24/16 - 10-49% bilaterally, unchanged  EKG 1/18/19 - Afib 60's with ventricular demand pacing   Carotid duplex 7/18/19 - 10-49% bilateral, unchanged from 4/18/17  EKG 8/26/20- AF w/ ventricular demand pacing   Follow-up and Dispositions    · Return in about 6 months (around 2/26/2021).           Written by Dereck Ag, as dictated by Nazario Sam MD.      Blanch Goodpasture, MD

## 2020-08-25 NOTE — PROGRESS NOTES
HISTORY OF PRESENTING ILLNESS      Brandon Serrato is a 66 y.o. male with dual-chamber pacemaker, tachycardia/bradycardia syndrome, GERD, peripheral vascular disease, dyslipidemia, CVA, atrial fibrillation and BPH presenting for follow up of his pacemaker. Interrogation of his pacemaker demonstrated normal functioning. There was one episode of RVR lasting 6 mintues back in March without recurrent tachycardia since then. His HR histogram is flat and he reported exertional fatigue. He is Vpaced 80%. SPECT in 2/2020 was unremarkable for ischemia. PAST MEDICAL HISTORY     Past Medical History:   Diagnosis Date    AR (allergic rhinitis) 2/11/2009    Atrial fibrillation ([facility]     onset 2003, now chronic, CHADS2 1-2. 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 Yang Levels HX [address]  06/2017    Right Eye laser treatment     HX VASECTOMY  25-30 yrs ago    STRESS TEST CARDIOLITE  4/2008    11 min., normal perfusion, EF 57%    VAS CAROTID DUPLEX BILATERAL  8/15/11    10-49% bilateral, unchanged from one year prior          ALLERGIES     Allergies   Allergen Reactions    Other Plant, Animal, Environmental Anaphylaxis     Entire body edema with fire ants    Amoxicillin Rash    Clindamycin Hives and Rash    Pcn [Penicillins] Rash     States he is able to tolerate cephalexin with no adverse reaction    Sulfa (Sulfonamide Antibiotics) Rash    Venom-Honey Bee Rash     Yellowjackets            FAMILY HISTORY     Family History   Problem Relation Age of Onset    Stroke Mother     Cancer Father         prostate    Hypertension Father     Diabetes Brother     Cancer Brother         prostate    Diabetes Brother     Hypertension Brother     Cancer Brother         melanoma    Cancer Daughter         melanoma    negative for cardiac disease       SOCIAL HISTORY     Social History     Socioeconomic History    Marital status:      Spouse name: Not on file    Number of children: Not on file    Years of education: Not on file    Highest education level: Not on file   Tobacco Use    Smoking status: Never Smoker    Smokeless tobacco: Never Used   Substance and Sexual Activity    Alcohol use: No     Comment: no     Drug use: No    Sexual activity: Not Currently         MEDICATIONS     Current Outpatient Medications   Medication Sig    Toprol XL 50 mg XL tablet TAKE ONE AND ONE-HALF TABLETS DAILY    omeprazole (PRILOSEC) 20 mg capsule TAKE 1 CAPSULE DAILY    ipratropium (ATROVENT) 0.03 % nasal spray 2 Sprays by Left Nostril route every twelve (12) hours.  dicyclomine (BENTYL) 10 mg capsule Take 10 mg by mouth three (3) times daily.     dabigatran etexilate (PRADAXA) 150 mg capsule Take 1 Cap by mouth two (2) times a day.  atorvastatin (LIPITOR) 40 mg tablet Take 1 Tab by mouth daily.  CIALIS 5 mg tablet     psyllium (METAMUCIL) packet Take 1 Packet by mouth.  KRILL OIL PO Take 1 Cap by mouth daily.  VOLTAREN 1 % gel as needed.  B.infantis-B.ani-B.long-B.bifi (PROBIOTIC 4X) 10-15 mg TbEC Take 1 Cap by mouth daily.  melatonin 5 mg cap capsule Take 5 mg by mouth nightly as needed.  acetaminophen (TYLENOL EXTRA STRENGTH) 500 mg tablet Take 2 tablets by mouth three (3) times daily as needed for Pain.  silodosin (RAPAFLO) 8 mg capsule Take 8 mg by mouth daily (with breakfast).  GLUCOSAMINE HCL/CHONDRO DONALDSON A (GLUCOSAMINE-CHONDROITIN PO) Take  by mouth daily.  aspirin delayed-release 81 mg tablet Take 81 mg by mouth daily.  cholecalciferol, vitamin d3, (VITAMIN D) 1,000 unit tablet Take  by mouth daily.  CLARITIN 10 mg Tab take 10 mg by mouth daily. No current facility-administered medications for this visit. I have reviewed the nurses notes, vitals, problem list, allergy list, medical history, family, social history and medications. REVIEW OF SYMPTOMS      General: Pt denies excessive weight gain or loss. Pt is able to conduct ADL's  HEENT: Denies blurred vision, headaches, hearing loss, epistaxis and difficulty swallowing. Respiratory: Denies cough, congestion, shortness of breath, CONTRERAS, wheezing or stridor. Cardiovascular: Denies precordial pain, palpitations, edema or PND  Gastrointestinal: Denies poor appetite, indigestion, abdominal pain or blood in stool  Genitourinary: Denies hematuria, dysuria, increased urinary frequency  Musculoskeletal: Denies joint pain or swelling from muscles or joints  Neurologic: Denies tremor, paresthesias, headache, or sensory motor disturbance  Psychiatric: Denies confusion, insomnia, depression  Integumentray: Denies rash, itching or ulcers.   Hematologic: Denies easy bruising, bleeding       PHYSICAL EXAMINATION      Vitals: see vitals section  General: Well developed, in no acute distress. HEENT: No jaundice, oral mucosa moist, no oral ulcers  Neck: Supple, no stiffness, no lymphadenopathy, supple  Heart:  Normal S1/S2 negative S3 or S4. Regular, no murmur, gallop or rub, no jugular venous distention  Respiratory: Clear bilaterally x 4, no wheezing or rales  Abdomen:   Soft, non-tender, bowel sounds are active. Extremities:  No edema, normal cap refill, no cyanosis. Musculoskeletal: No clubbing, no deformities  Neuro: A&Ox3, speech clear, gait stable, cooperative, no focal neurologic deficits  Skin: Skin color is normal. No rashes or lesions. Non diaphoretic, moist.  Vascular: 2+ pulses symmetric in all extremities       DIAGNOSTIC DATA      EKG:        LABORATORY DATA      Lab Results   Component Value Date/Time    WBC 5.3 02/18/2020 03:26 PM    HGB 15.8 02/18/2020 03:26 PM    HCT 48.3 02/18/2020 03:26 PM    PLATELET 157 (L) 93/75/9746 03:26 PM    MCV 93.6 02/18/2020 03:26 PM      Lab Results   Component Value Date/Time    Sodium 140 02/18/2020 03:26 PM    Potassium 4.4 02/18/2020 03:26 PM    Chloride 108 02/18/2020 03:26 PM    CO2 28 02/18/2020 03:26 PM    Anion gap 4 (L) 02/18/2020 03:26 PM    Glucose 81 02/18/2020 03:26 PM    BUN 14 02/18/2020 03:26 PM    Creatinine 0.99 02/18/2020 03:26 PM    BUN/Creatinine ratio 14 02/18/2020 03:26 PM    GFR est AA >60 02/18/2020 03:26 PM    GFR est non-AA >60 02/18/2020 03:26 PM    Calcium 8.9 02/18/2020 03:26 PM    Bilirubin, total 1.7 (H) 02/18/2020 03:26 PM    Alk. phosphatase 110 02/18/2020 03:26 PM    Protein, total 6.8 02/18/2020 03:26 PM    Albumin 3.9 02/18/2020 03:26 PM    Globulin 2.9 02/18/2020 03:26 PM    A-G Ratio 1.3 02/18/2020 03:26 PM    ALT (SGPT) 24 02/18/2020 03:26 PM           ASSESSMENT      1. Pacemaker  2. Atrial fibrillation  3. Dyslipidemia  4. BPH  5. Peripheral vascular disease       PLAN     RR adjusted today.  Will monitor for improvement of symptoms with this. Will arrange for device interrogation in 1 month to determine whether additional adjustments warranted and whether improvement in histogram correlates with improvement in symptoms. ICD-10-CM ICD-9-CM    1. Cardiac pacemaker in situ  Z95.0 V45.01    2. Atrial fibrillation, unspecified type ([facility] [address]  I48.91 427.31    3. Dyslipidemia  E78.5 272.4    4. Bilateral carotid artery stenosis  I65.23 433.10      433.30    5. Chronic fatigue  R53.82 780.79    6. James-tachy syndrome (HCC)  I49.5 427.81    7. 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 FOLLOW-UP     3 months      Thank you, Will Wagner MD  And Dr. Rocael Wall for allowing me to participate in the care of this extraordinarily pleasant male. Please do not hesitate to contact me for further questions/concerns.          Roberth Fowler MD  Cardiac Electrophysiology / Cardiology    [address]  [address] [place] [address]  [phone] / [phone] Fax   [phone] / [phone] Fax

## 2020-08-26 ENCOUNTER — OFFICE VISIT (OUTPATIENT)
Dept: CARDIOLOGY CLINIC | Age: 75
End: 2020-08-26
Payer: MEDICARE

## 2020-08-26 ENCOUNTER — APPOINTMENT (OUTPATIENT)
Dept: CARDIOLOGY CLINIC | Age: 75
End: 2020-08-26

## 2020-08-26 VITALS
WEIGHT: 226 LBS | RESPIRATION RATE: 16 BRPM | DIASTOLIC BLOOD PRESSURE: 78 MMHG | HEIGHT: 74 IN | HEART RATE: 64 BPM | OXYGEN SATURATION: 98 % | SYSTOLIC BLOOD PRESSURE: 112 MMHG | BODY MASS INDEX: 29 KG/M2

## 2020-08-26 VITALS
BODY MASS INDEX: 29 KG/M2 | WEIGHT: 226 LBS | HEIGHT: 74 IN | HEART RATE: 64 BPM | DIASTOLIC BLOOD PRESSURE: 78 MMHG | SYSTOLIC BLOOD PRESSURE: 112 MMHG | OXYGEN SATURATION: 95 %

## 2020-08-26 DIAGNOSIS — I48.91 ATRIAL FIBRILLATION, UNSPECIFIED TYPE (HCC): ICD-10-CM

## 2020-08-26 DIAGNOSIS — I63.00 CEREBRAL INFARCTION DUE TO THROMBOSIS OF PRECEREBRAL ARTERY (HCC): ICD-10-CM

## 2020-08-26 DIAGNOSIS — I65.23 BILATERAL CAROTID ARTERY STENOSIS: ICD-10-CM

## 2020-08-26 DIAGNOSIS — Z95.0 CARDIAC PACEMAKER IN SITU: ICD-10-CM

## 2020-08-26 DIAGNOSIS — I49.5 BRADY-TACHY SYNDROME (HCC): ICD-10-CM

## 2020-08-26 DIAGNOSIS — Z95.0 CARDIAC PACEMAKER IN SITU: Primary | ICD-10-CM

## 2020-08-26 DIAGNOSIS — E78.5 DYSLIPIDEMIA: ICD-10-CM

## 2020-08-26 DIAGNOSIS — I48.20 CHRONIC ATRIAL FIBRILLATION (HCC): Primary | ICD-10-CM

## 2020-08-26 DIAGNOSIS — R53.82 CHRONIC FATIGUE: ICD-10-CM

## 2020-08-26 PROCEDURE — 3288F FALL RISK ASSESSMENT DOCD: CPT | Performed by: SPECIALIST

## 2020-08-26 PROCEDURE — 1100F PTFALLS ASSESS-DOCD GE2>/YR: CPT | Performed by: INTERNAL MEDICINE

## 2020-08-26 PROCEDURE — G8536 NO DOC ELDER MAL SCRN: HCPCS | Performed by: SPECIALIST

## 2020-08-26 PROCEDURE — 1100F PTFALLS ASSESS-DOCD GE2>/YR: CPT | Performed by: SPECIALIST

## 2020-08-26 PROCEDURE — G8427 DOCREV CUR MEDS BY ELIG CLIN: HCPCS | Performed by: INTERNAL MEDICINE

## 2020-08-26 PROCEDURE — G8510 SCR DEP NEG, NO PLAN REQD: HCPCS | Performed by: INTERNAL MEDICINE

## 2020-08-26 PROCEDURE — 99214 OFFICE O/P EST MOD 30 MIN: CPT | Performed by: SPECIALIST

## 2020-08-26 PROCEDURE — G0463 HOSPITAL OUTPT CLINIC VISIT: HCPCS | Performed by: INTERNAL MEDICINE

## 2020-08-26 PROCEDURE — G0463 HOSPITAL OUTPT CLINIC VISIT: HCPCS | Performed by: SPECIALIST

## 2020-08-26 PROCEDURE — G8419 CALC BMI OUT NRM PARAM NOF/U: HCPCS | Performed by: INTERNAL MEDICINE

## 2020-08-26 PROCEDURE — G8419 CALC BMI OUT NRM PARAM NOF/U: HCPCS | Performed by: SPECIALIST

## 2020-08-26 PROCEDURE — 99215 OFFICE O/P EST HI 40 MIN: CPT | Performed by: INTERNAL MEDICINE

## 2020-08-26 PROCEDURE — G8510 SCR DEP NEG, NO PLAN REQD: HCPCS | Performed by: SPECIALIST

## 2020-08-26 PROCEDURE — 93005 ELECTROCARDIOGRAM TRACING: CPT | Performed by: INTERNAL MEDICINE

## 2020-08-26 PROCEDURE — G8427 DOCREV CUR MEDS BY ELIG CLIN: HCPCS | Performed by: SPECIALIST

## 2020-08-26 PROCEDURE — 93010 ELECTROCARDIOGRAM REPORT: CPT | Performed by: INTERNAL MEDICINE

## 2020-08-26 PROCEDURE — 3288F FALL RISK ASSESSMENT DOCD: CPT | Performed by: INTERNAL MEDICINE

## 2020-08-26 PROCEDURE — G8536 NO DOC ELDER MAL SCRN: HCPCS | Performed by: INTERNAL MEDICINE

## 2020-08-26 RX ORDER — ATORVASTATIN CALCIUM 40 MG/1
40 TABLET, FILM COATED ORAL DAILY
Qty: 90 TAB | Refills: 3 | Status: CANCELLED | OUTPATIENT
Start: 2020-08-26

## 2020-08-26 NOTE — PROGRESS NOTES
Periods of GERD    Visit Vitals  /78 (BP 1 Location: Left arm, BP Patient Position: Sitting)   Pulse 64   Ht 6' 2\" (1.88 m)   Wt 226 lb (102.5 kg)   SpO2 95%   BMI 29.02 kg/m²       Chest pain: no  Shortness of breath: no  Edema: no  Palpitations: no  Dizziness: no    New diagnosis/Surgeries: no    ER/Hospitalizations: no    Refills:Atorvastatin 90 days mail order

## 2020-08-26 NOTE — LETTER
8/26/20 Patient: Lauri Lindsey YOB: 1945 Date of Visit: 8/26/2020 Johnson Hong MD 
[place] [address] [address] [facility] [id number] VIA In Basket Dear Johnson Hong MD, Thank you for referring Mr. Day Body to [address] for evaluation. My notes for this consultation are attached. If you have questions, please do not hesitate to call me. I look forward to following your patient along with you. Sincerely, Yohana Sanchez MD

## 2020-08-26 NOTE — PROGRESS NOTES
Erin aFir is a 66 y.o. male    Chief Complaint   Patient presents with    Irregular Heart Beat    Carotid Artery Stenosis       Chest pain : NO  SOB : NO  Dizziness : NO  Edema : NO  Refills : NO    Visit Vitals  /78 (BP 1 Location: Left arm, BP Patient Position: Sitting)   Pulse 64   Resp 16   Ht 6' 2\" (1.88 m)   Wt 226 lb (102.5 kg)   SpO2 98%   BMI 29.02 kg/m²       1. Have you been to the ER, urgent care clinic since your last visit? Hospitalized since your last visit? No    2. Have you seen or consulted any other health care providers outside of the [address] [place] [address] since your last visit? Include any pap smears or colon screening.  No

## 2020-09-22 RX ORDER — ATORVASTATIN CALCIUM 40 MG/1
TABLET, FILM COATED ORAL
Qty: 90 TAB | Refills: 3 | Status: SHIPPED | OUTPATIENT
Start: 2020-09-22 | End: 2021-01-07

## 2020-09-30 ENCOUNTER — APPOINTMENT (OUTPATIENT)
Dept: CARDIOLOGY CLINIC | Age: 75
End: 2020-09-30

## 2020-12-07 ENCOUNTER — PATIENT MESSAGE (OUTPATIENT)
Dept: CARDIOLOGY CLINIC | Age: 75
End: 2020-12-07

## 2020-12-09 ENCOUNTER — DOCUMENTATION ONLY (OUTPATIENT)
Dept: CARDIOLOGY CLINIC | Age: 75
End: 2020-12-09

## 2020-12-09 NOTE — PROGRESS NOTES
HISTORY OF PRESENTING ILLNESS      Krystal Gilbert is a 66 y.o. male with dual-chamber pacemaker, tachycardia/bradycardia syndrome, GERD, peripheral vascular disease, dyslipidemia, CVA, atrial fibrillation and BPH presenting for follow up of his pacemaker. Interrogation of his pacemaker demonstrated normal functioning. There was one episode of RVR lasting 6 mintues back in March without recurrent tachycardia since then. His HR histogram is flat and he reported exertional fatigue. He is Vpaced 80%. SPECT in 2/2020 was unremarkable for ischemia. Interrogation in 11/20 demonstrated normal functioning with 2 HVR's but he has been exercising. PAST MEDICAL HISTORY     Past Medical History:   Diagnosis Date    AR (allergic rhinitis) 2/11/2009    Atrial fibrillation ([address]     onset 2003, now chronic, CHADS2 1-2. 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    Thyroid nodule 2/11/2009           PAST SURGICAL HISTORY     Past Surgical History:   Procedure Laterality Date    CARDIAC SURG PROCEDURE UNLIST      pacemaker placement    COLONOSCOPY N/A 10/21/2019    COLONOSCOPY- Check for device orders performed by Parker Epley, MD at [address] [facility] ECHO 2D ADULT  8/15/11    EF 55%, biatrial enlargement, mild MR    ENDOSCOPY, COLON, DIAGNOSTIC  HX HEENT  9/2008    nasal septoplasty    HX KNEE ARTHROSCOPY  07/2016    HX ORTHOPAEDIC  07/01/2016    Right Knee Scope with meniscus surgery    HX PACEMAKER  Aug 2009    Dr. Tahir Marquez [facility] [address]  06/2017    Right Eye laser treatment     HX VASECTOMY  25-30 yrs ago    STRESS TEST CARDIOLITE  4/2008    11 min., normal perfusion, EF 57%    VAS CAROTID DUPLEX BILATERAL  8/15/11    10-49% bilateral, unchanged from one year prior          ALLERGIES     Allergies   Allergen Reactions    Other Plant, Animal, Environmental Anaphylaxis     Entire body edema with fire ants    Amoxicillin Rash    Clindamycin Hives and Rash    Pcn [Penicillins] Rash     States he is able to tolerate cephalexin with no adverse reaction    Sulfa (Sulfonamide Antibiotics) Rash    Venom-Honey Bee Rash     Yellowjackets            FAMILY HISTORY     Family History   Problem Relation Age of Onset    Stroke Mother     Cancer Father         prostate    Hypertension Father     Diabetes Brother     Cancer Brother         prostate    Diabetes Brother     Hypertension Brother     Cancer Brother         melanoma    Cancer Daughter         melanoma    negative for cardiac disease       SOCIAL HISTORY     Social History     Socioeconomic History    Marital status:      Spouse name: Not on file    Number of children: Not on file    Years of education: Not on file    Highest education level: Not on file   Tobacco Use    Smoking status: Never Smoker    Smokeless tobacco: Never Used   Substance and Sexual Activity    Alcohol use: No     Comment: no     Drug use: No    Sexual activity: Not Currently         MEDICATIONS     Current Outpatient Medications   Medication Sig    atorvastatin (LIPITOR) 40 mg tablet TAKE 1 TABLET DAILY    Toprol XL 50 mg XL tablet TAKE ONE AND ONE-HALF TABLETS DAILY    omeprazole (PRILOSEC) 20 mg capsule TAKE 1 CAPSULE DAILY    ipratropium (ATROVENT) 0.03 % nasal spray 2 Sprays by Left Nostril route every twelve (12) hours.  dicyclomine (BENTYL) 10 mg capsule Take 10 mg by mouth three (3) times daily.  dabigatran etexilate (PRADAXA) 150 mg capsule Take 1 Cap by mouth two (2) times a day.  CIALIS 5 mg tablet     psyllium (METAMUCIL) packet Take 1 Packet by mouth.  KRILL OIL PO Take 1 Cap by mouth daily.  VOLTAREN 1 % gel as needed.  B.infantis-B.ani-B.long-B.bifi (PROBIOTIC 4X) 10-15 mg TbEC Take 1 Cap by mouth daily.  melatonin 5 mg cap capsule Take 5 mg by mouth nightly as needed.  acetaminophen (TYLENOL EXTRA STRENGTH) 500 mg tablet Take 2 tablets by mouth three (3) times daily as needed for Pain.  silodosin (RAPAFLO) 8 mg capsule Take 8 mg by mouth daily (with breakfast).  GLUCOSAMINE HCL/CHONDRO DONALDSON A (GLUCOSAMINE-CHONDROITIN PO) Take  by mouth daily.  aspirin delayed-release 81 mg tablet Take 81 mg by mouth daily.  cholecalciferol, vitamin d3, (VITAMIN D) 1,000 unit tablet Take  by mouth daily.  CLARITIN 10 mg Tab take 10 mg by mouth daily. No current facility-administered medications for this visit. I have reviewed the nurses notes, vitals, problem list, allergy list, medical history, family, social history and medications. REVIEW OF SYMPTOMS      General: Pt denies excessive weight gain or loss. Pt is able to conduct ADL's  HEENT: Denies blurred vision, headaches, hearing loss, epistaxis and difficulty swallowing. Respiratory: Denies cough, congestion, shortness of breath, CONTRERAS, wheezing or stridor.   Cardiovascular: Denies precordial pain, palpitations, edema or PND  Gastrointestinal: Denies poor appetite, indigestion, abdominal pain or blood in stool  Genitourinary: Denies hematuria, dysuria, increased urinary frequency  Musculoskeletal: Denies joint pain or swelling from muscles or joints  Neurologic: Denies tremor, paresthesias, headache, or sensory motor disturbance  Psychiatric: Denies confusion, insomnia, depression  Integumentray: Denies rash, itching or ulcers. Hematologic: Denies easy bruising, bleeding       PHYSICAL EXAMINATION      Vitals: see vitals section  General: Well developed, in no acute distress. HEENT: No jaundice, oral mucosa moist, no oral ulcers  Neck: Supple, no stiffness, no lymphadenopathy, supple  Heart:  Normal S1/S2 negative S3 or S4. Regular, no murmur, gallop or rub, no jugular venous distention  Respiratory: Clear bilaterally x 4, no wheezing or rales  Abdomen:   Soft, non-tender, bowel sounds are active. Extremities:  No edema, normal cap refill, no cyanosis. Musculoskeletal: No clubbing, no deformities  Neuro: A&Ox3, speech clear, gait stable, cooperative, no focal neurologic deficits  Skin: Skin color is normal. No rashes or lesions. Non diaphoretic, moist.  Vascular: 2+ pulses symmetric in all extremities       DIAGNOSTIC DATA      EKG:        LABORATORY DATA      Lab Results   Component Value Date/Time    WBC 5.3 02/18/2020 03:26 PM    HGB 15.8 02/18/2020 03:26 PM    HCT 48.3 02/18/2020 03:26 PM    PLATELET 263 (L) 99/26/5486 03:26 PM    MCV 93.6 02/18/2020 03:26 PM      Lab Results   Component Value Date/Time    Sodium 140 02/18/2020 03:26 PM    Potassium 4.4 02/18/2020 03:26 PM    Chloride 108 02/18/2020 03:26 PM    CO2 28 02/18/2020 03:26 PM    Anion gap 4 (L) 02/18/2020 03:26 PM    Glucose 81 02/18/2020 03:26 PM    BUN 14 02/18/2020 03:26 PM    Creatinine 0.99 02/18/2020 03:26 PM    BUN/Creatinine ratio 14 02/18/2020 03:26 PM    GFR est AA >60 02/18/2020 03:26 PM    GFR est non-AA >60 02/18/2020 03:26 PM    Calcium 8.9 02/18/2020 03:26 PM    Bilirubin, total 1.7 (H) 02/18/2020 03:26 PM    Alk. phosphatase 110 02/18/2020 03:26 PM    Protein, total 6.8 02/18/2020 03:26 PM    Albumin 3.9 02/18/2020 03:26 PM    Globulin 2.9 02/18/2020 03:26 PM    A-G Ratio 1.3 02/18/2020 03:26 PM    ALT (SGPT) 24 02/18/2020 03:26 PM           ASSESSMENT      1. Pacemaker   A. Right sided   B. Dual     C. SJM  2. Atrial fibrillation  3. Dyslipidemia  4. BPH  5. Peripheral vascular disease        PLAN     Continue monitoring in device clinic. ICD-10-CM ICD-9-CM    1. Cardiac pacemaker in situ  Z95.0 V45.01    2. Atrial fibrillation, unspecified type ([address]  I48.91 427.31    3. Dyslipidemia  E78.5 272.4    4. James-tachy syndrome (HCC)  I49.5 427.81    5. Bilateral carotid artery stenosis  I65.23 433.10      433.30    6. Chronic fatigue  R53.82 780.79    7. 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 FOLLOW-UP     1 year        Thank you, Erasto Richardson MD and Dr. Dali Wray for allowing me to participate in the care of this extraordinarily pleasant male. Please do not hesitate to contact me for further questions/concerns.          Phylicia Burk MD  Cardiac Electrophysiology / Cardiology    [address]  [address] [place] [address]  [facility]    Duarte Small  [phone] / [phone] Fax   [phone] / [phone] Fax

## 2020-12-09 NOTE — PROGRESS NOTES
Cardiac risk stratification requested from [address] [place] [address] for Mr. Elisa Camacho. Surgeon: Dr. Precious Harrison    Procedure: STT arthroplasty on 1/27/21    Anesthesia: Regional block wth IV sedation. Last seen by Dr. Hans Knox on 8/26/20. Last office note attached. Last Cardiac testing 2/2020 with [facility] - results provided. Based on this data. Mr. Elisa Camacho would be considered low cardiac risk to proceed with procedure. No additional cardiac testing is needed at this time. It is safe to stop anticoagulant therapy with Pradaxa 2-3 days preop, resuming post op when safe to do so. Bridging anticoagulation is not necessary.

## 2020-12-11 ENCOUNTER — OFFICE VISIT (OUTPATIENT)
Dept: CARDIOLOGY CLINIC | Age: 75
End: 2020-12-11
Payer: MEDICARE

## 2020-12-11 VITALS
SYSTOLIC BLOOD PRESSURE: 108 MMHG | WEIGHT: 212 LBS | OXYGEN SATURATION: 94 % | DIASTOLIC BLOOD PRESSURE: 60 MMHG | HEIGHT: 74 IN | HEART RATE: 68 BPM | BODY MASS INDEX: 27.21 KG/M2

## 2020-12-11 DIAGNOSIS — I48.91 ATRIAL FIBRILLATION, UNSPECIFIED TYPE (HCC): ICD-10-CM

## 2020-12-11 DIAGNOSIS — R53.82 CHRONIC FATIGUE: ICD-10-CM

## 2020-12-11 DIAGNOSIS — E78.5 DYSLIPIDEMIA: ICD-10-CM

## 2020-12-11 DIAGNOSIS — I63.00 CEREBRAL INFARCTION DUE TO THROMBOSIS OF PRECEREBRAL ARTERY (HCC): ICD-10-CM

## 2020-12-11 DIAGNOSIS — I65.23 BILATERAL CAROTID ARTERY STENOSIS: ICD-10-CM

## 2020-12-11 DIAGNOSIS — I49.5 BRADY-TACHY SYNDROME (HCC): ICD-10-CM

## 2020-12-11 DIAGNOSIS — Z95.0 CARDIAC PACEMAKER IN SITU: Primary | ICD-10-CM

## 2020-12-11 PROCEDURE — G8510 SCR DEP NEG, NO PLAN REQD: HCPCS | Performed by: INTERNAL MEDICINE

## 2020-12-11 PROCEDURE — G8427 DOCREV CUR MEDS BY ELIG CLIN: HCPCS | Performed by: INTERNAL MEDICINE

## 2020-12-11 PROCEDURE — G0463 HOSPITAL OUTPT CLINIC VISIT: HCPCS | Performed by: INTERNAL MEDICINE

## 2020-12-11 PROCEDURE — G8419 CALC BMI OUT NRM PARAM NOF/U: HCPCS | Performed by: INTERNAL MEDICINE

## 2020-12-11 PROCEDURE — G8536 NO DOC ELDER MAL SCRN: HCPCS | Performed by: INTERNAL MEDICINE

## 2020-12-11 PROCEDURE — 1100F PTFALLS ASSESS-DOCD GE2>/YR: CPT | Performed by: INTERNAL MEDICINE

## 2020-12-11 PROCEDURE — 3288F FALL RISK ASSESSMENT DOCD: CPT | Performed by: INTERNAL MEDICINE

## 2020-12-11 PROCEDURE — 99215 OFFICE O/P EST HI 40 MIN: CPT | Performed by: INTERNAL MEDICINE

## 2020-12-11 NOTE — PROGRESS NOTES
Room 2    Visit Vitals  /60 (BP 1 Location: Left arm, BP Patient Position: Sitting)   Pulse 68   Ht 6' 2\" (1.88 m)   Wt 212 lb (96.2 kg)   SpO2 94%   BMI 27.22 kg/m²       Chest pain: no  Shortness of breath: no  Edema: no  Palpitations, Skipped beats, Rapid heartbeat: no  Dizziness: no    New diagnosis/Surgeries: no    ER/Hospitalizations: no    Refills: no

## 2021-01-07 ENCOUNTER — HOSPITAL ENCOUNTER (OUTPATIENT)
Dept: PREADMISSION TESTING | Age: 76
Discharge: HOME | End: 2021-01-07
Payer: MEDICARE

## 2021-01-07 VITALS
TEMPERATURE: 97.5 F | RESPIRATION RATE: 20 BRPM | HEART RATE: 62 BPM | SYSTOLIC BLOOD PRESSURE: 142 MMHG | WEIGHT: 219.7 LBS | BODY MASS INDEX: 28.19 KG/M2 | HEIGHT: 74 IN | DIASTOLIC BLOOD PRESSURE: 68 MMHG | OXYGEN SATURATION: 98 %

## 2021-01-07 LAB
ANION GAP SERPL CALC-SCNC: 0 MMOL/L (ref 5–15)
BUN SERPL-MCNC: 21 MG/DL (ref 6–20)
BUN/CREAT SERPL: 20 (ref 12–20)
CALCIUM SERPL-MCNC: 9 MG/DL (ref 8.5–10.1)
CHLORIDE SERPL-SCNC: 109 MMOL/L (ref 97–108)
CO2 SERPL-SCNC: 33 MMOL/L (ref 21–32)
CREAT SERPL-MCNC: 1.06 MG/DL (ref 0.7–1.3)
GLUCOSE SERPL-MCNC: 78 MG/DL (ref 65–100)
POTASSIUM SERPL-SCNC: 4.6 MMOL/L (ref 3.5–5.1)
SODIUM SERPL-SCNC: 142 MMOL/L (ref 136–145)

## 2021-01-07 PROCEDURE — 36415 COLL VENOUS BLD VENIPUNCTURE: CPT

## 2021-01-07 PROCEDURE — 80048 BASIC METABOLIC PNL TOTAL CA: CPT

## 2021-01-07 RX ORDER — METOPROLOL SUCCINATE 50 MG/1
75 TABLET, EXTENDED RELEASE ORAL EVERY EVENING
COMMUNITY

## 2021-01-07 RX ORDER — ATORVASTATIN CALCIUM 40 MG/1
40 TABLET, FILM COATED ORAL EVERY EVENING
COMMUNITY

## 2021-01-07 RX ORDER — LORATADINE 10 MG
10 TABLET,DISINTEGRATING ORAL DAILY
COMMUNITY

## 2021-01-07 NOTE — H&P
History and Physical    Patient: Nannette Meckel MRN: [id number]  SSN: [id number]    YOB: 1945  Age: 66 y.o. Sex: male      Subjective:      Nannette Meckel is a 66 y.o. male who has had left thumb pain for three years. The pain has gotten worse over time and now interfers with daily life. He cannot push anything with his hand without increased pain. Pain is located in his thumb joint and radiates to his wrist. He is RHD    He is followed by Dr. Adrian Jesus and Dr. Bakari Luis. We have cardiac clearance and medication plan on chart. Pacemaker plan on chart.      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 Emanuel Phan HX [address]  06/2017    Right Eye laser treatment     HX VASECTOMY  25-30 yrs ago    [place] CARDIAC SURG PROCEDURE UNLIST      pacemaker placement    STRESS TEST CARDIOLITE  4/2008    11 min., normal perfusion, EF 57%    VAS CAROTID DUPLEX BILATERAL  8/15/11    10-49% bilateral, unchanged from one year prior      Family History   Problem Relation Age of Onset    Stroke Mother     Cancer Father         prostate    Hypertension Father     Diabetes Brother     Cancer Brother         prostate    Diabetes Brother     Hypertension Brother     Cancer Brother         melanoma    Cancer Daughter         melanoma    Anesth Problems Neg Hx      Social History     Tobacco Use    Smoking status: Never Smoker    Smokeless tobacco: Never Used   Substance Use Topics    Alcohol use: No      Prior to Admission medications    Medication Sig Start Date End Date Taking? Authorizing Provider   aspirin delayed-release 81 mg tablet Take 81 mg by mouth every evening. Yes Provider, Historical   Toprol XL 50 mg XL tablet TAKE ONE AND ONE-HALF TABLETS DAILY 4/2/20   Zandra Seals MD   omeprazole (PRILOSEC) 20 mg capsule TAKE 1 CAPSULE DAILY 4/1/20   Monster Aranda MD   ipratropium (ATROVENT) 0.03 % nasal spray 2 Sprays by Left Nostril route every twelve (12) hours. 3/24/20   Monster Aranda MD   dicyclomine (BENTYL) 10 mg capsule Take 10 mg by mouth three (3) times daily. Provider, Historical   dabigatran etexilate (PRADAXA) 150 mg capsule Take 1 Cap by mouth two (2) times a day. 1/21/20   Zandra Seals MD   CIALIS 5 mg tablet  2/4/19   Provider, Historical   psyllium (METAMUCIL) packet Take 1 Packet by mouth. Provider, Historical   KRILL OIL PO Take 1 Cap by mouth daily. Provider, Historical   VOLTAREN 1 % gel as needed. 4/2/17   Provider, Historical   B.infantis-B.ani-B.long-B.bifi (PROBIOTIC 4X) 10-15 mg TbEC Take 1 Cap by mouth daily. Provider, Historical   melatonin 5 mg cap capsule Take 5 mg by mouth nightly as needed. 11/29/16   Farrukh Jose MD   acetaminophen (TYLENOL EXTRA STRENGTH) 500 mg tablet Take 2 tablets by mouth three (3) times daily as needed for Pain. Farrukh Jose MD   silodosin (RAPAFLO) 8 mg capsule Take 8 mg by mouth daily (with breakfast).     Provider, Historical   GLUCOSAMINE HCL/CHONDRO DONALDSON A (GLUCOSAMINE-CHONDROITIN PO) Take  by mouth daily. Provider, Historical   cholecalciferol, vitamin d3, (VITAMIN D) 1,000 unit tablet Take 2,000 Units by mouth daily. Provider, Historical   CLARITIN 10 mg Tab take 10 mg by mouth daily. Provider, Historical        Allergies   Allergen Reactions    Other Plant, Animal, Environmental Angioedema     Entire body edema with fire ants    Amoxicillin Rash    Clindamycin Hives and Rash    Milk Containing Products Diarrhea    Pcn [Penicillins] Rash     States he is able to tolerate cephalexin with no adverse reaction    Sulfa (Sulfonamide Antibiotics) Rash    Venom-Honey Bee Rash     Yellowjackets         Review of Systems:  Constitutional: Negative for chills and fever  HENT: Negative for congestion and sore throat  Eyes: negative for blurred vision and double vision  Respiratory: Negative for cough, shortness of breath and wheezing  Mouth: Negative for loose, broken or chipped teeth. Cardiovascular: Negative for chest pain and palpitations  Gastrointestinal: Negative for abdominal pain, constipation, diarrhea and nausea  Genitourinary: Negative for dysuria and hematuria  Musculoskeletal: Thumb pain  Skin: Negative for rash, open wounds. Bruises easily  Neurological: Negative for dizziness, tremors and headaches  Psychiatric: Negative for depression. The patient is not nervous/anxious.     Objective:     Vitals:    01/07/21 0818   BP: (!) 142/68   Pulse: 62   Resp: 20   Temp: 97.5 °F (36.4 °C)   SpO2: 98%   Weight: 99.7 kg (219 lb 11.2 oz)   Height: 6' 2\" (1.88 m)        Physical Exam:  Constitutional:  Appears well,  No Acute Distress, Vitals noted  Psychiatric:   Affect normal, Alert and Oriented to person/place/time    Eyes:   Pupils equally round and reactive, EOMI, conjunctiva clear, eyelids normal  ENT:   External ears and nose normal, teeth normal, gums normal, TMs and Orophyarynx normal  Neck:   General inspection and Thyroid normal.  No abnormal cervical or supraclavicular nodes    Lungs:   Clear to auscultation, good respiratory effort  Heart: Ausculation normal.  Regular rhythm. No cardiac murmurs. No carotid bruits or palpable thrills  Chest wall normal  Musculoskeletal:  weaker left hand, limited ROM to thumb  Extremities:   Without edema, good peripheral pulses  Skin:   Warm to palpation, without rashes, bruising, or suspicious lesions     Recent Results (from the past 72 hour(s))   METABOLIC PANEL, BASIC    Collection Time: 01/07/21  9:29 AM   Result Value Ref Range    Sodium 142 136 - 145 mmol/L    Potassium 4.6 3.5 - 5.1 mmol/L    Chloride 109 (H) 97 - 108 mmol/L    CO2 33 (H) 21 - 32 mmol/L    Anion gap 0 (L) 5 - 15 mmol/L    Glucose 78 65 - 100 mg/dL    BUN 21 (H) 6 - 20 MG/DL    Creatinine 1.06 0.70 - 1.30 MG/DL    BUN/Creatinine ratio 20 12 - 20      GFR est AA >60 >60 ml/min/1.73m2    GFR est non-AA >60 >60 ml/min/1.73m2    Calcium 9.0 8.5 - 10.1 MG/DL       Assessment:     OA Left Thumb    Plan:     Left thumb arthroplasty  BMP reviewed.  EKG from cardiology visit reviewed    Signed By: Anjel Grant NP     January 7, 2021

## 2021-01-07 NOTE — PERIOP NOTES
[address] [facility], [address] [facility]   MAIN OR                                  [phone]   MAIN PRE OP                          [phone]                                                                                AMBULATORY PRE OP          [phone]  PRE-ADMISSION TESTING    [phone]   Surgery Date:  1/20/2021       Is surgery arrival time given by surgeon? NO  If NO, [facility] staff will call you between 3 and 7pm the day before your surgery with your arrival time. (If your surgery is on a Monday, we will call you the Friday before.)    Call [phone] after 7pm Monday-Friday if you did not receive this call. INSTRUCTIONS BEFORE YOUR SURGERY   When You  Arrive Arrive at the [address] [facility] on the day of your surgery  Have your insurance card, photo ID, and any copayment (if needed)   Food   and   Drink NO food or drink after midnight the night before surgery    This means NO water, gum, mints, coffee, juice, etc.  No alcohol (beer, wine, liquor) 24 hours before and after surgery   Medications to   TAKE   Morning of Surgery MEDICATIONS TO TAKE THE MORNING OF SURGERY WITH A SIP OF WATER:    Silodosin    Omeprazole   Ipratropium nasal spray   Loratadine if needed   Medications  To  STOP      7 days before surgery  Non-Steroidal anti-inflammatory Drugs (NSAID's): for example, Ibuprofen (Advil, Motrin), Naproxen (Aleve)   Aspirin, if taking for pain    Herbal supplements, vitamins, and fish oil   Other:  (Pain medications not listed above, including Tylenol may be taken)   Blood  Thinners  If you take  Aspirin, Plavix, Coumadin, or any blood-thinning or anti-blood clot medicine, talk to the doctor who prescribed the medications for pre-operative instructions.     Stop Aspirin and Pradaxa 2-3 days pre- operatively as per Yong Rivera NP   [address] If you shower the morning of surgery, please do not apply anything to your skin (lotions, powders, deodorant, or makeup, especially mascara)   Follow Chlorhexidine Care Fusion body wash instructions provided to you during PAT appointment. Begin 3 days prior to surgery.  Do not shave or trim anywhere 24 hours before surgery   Wear your hair loose or down; no pony-tails, buns, or metal hair clips   Wear loose, comfortable, clean clothes   Wear glasses instead of contacts   Leave money, valuables, and jewelry, including body piercings, at home   Going Home - or Spending the Night  SAME-DAY SURGERY: You must have a responsible adult drive you home and stay with you 24 hours after surgery   ADMITS: If your doctor is keeping you in the hospital after surgery, leave personal belongings/luggage in your car until you have a hospital room number. Hospital discharge time is 12 noon  Drivers must be here before 12 noon unless you are told differently   Special Instructions It is now mandated that all surgical patients be tested for COVID-19 prior to surgery. Testing has to be exactly 4 days prior to surgery. Your COVID test date is 1/16/2021 between 8:00 am and 11:00 am.       COVID testing will be performed curbside at the [facility]  [facility]. There will be signs leading you to the testing site. You will need to bring a photo ID with you to be swabbed. Patients are advised to self-quarantine at home after testing and prior to your surgery date. You will be notified if your results are positive.     What to watch for:   Coronavirus (COVID-19) affects different people in different ways   It also appears with a wide range of symptoms from mild to severe   Signs usually appear 2-14 days after exposure     If you develop any of the following, notify your doctor immediately:  o Fever  o Chills, with or without a shiver  o Muscle pain  o Headache  o Sore throat  o Dry cough  o New loss of taste or smell  o Tiredness      If you develop any of the following, call 258:  o Shortness of breath  o Difficulty breathing  o Chest pain  o New confusion  o Blueness of fingers and/or lips     Follow all instructions so your surgery wont be cancelled. Please, be on time. If a situation occurs and you are delayed the day of surgery, call  [phone]. If your physical condition changes (like a fever, cold, flu, etc.) call your surgeon. Home medication(s) reviewed and verified via  LIST  during PAT appointment. The patient was contacted by  IN-PERSON  The patient verbalizes understanding of all instructions and DOES NOT   need reinforcement.

## 2021-01-14 ENCOUNTER — OFFICE VISIT (OUTPATIENT)
Dept: CARDIOLOGY CLINIC | Age: 76
End: 2021-01-14
Payer: MEDICARE

## 2021-01-14 DIAGNOSIS — Z95.0 CARDIAC PACEMAKER IN SITU: Primary | ICD-10-CM

## 2021-01-14 PROCEDURE — 93294 REM INTERROG EVL PM/LDLS PM: CPT | Performed by: INTERNAL MEDICINE

## 2021-01-14 NOTE — LETTER
1/18/2021 11:48 AM 
 
[facility] [facility] [id number] Dear Patient, We have received your recent remote monitor check of your implanted device scheduled on  1/14/2021. Your remaining estimated battery life is 5.7 years and your device is working normally & appropriately. Your next remote monitor check is scheduled for 4/22/2021. If you have any questions, please call the Pacemaker/ICD clinic at the [facility] location at [phone]. Sincerely, 
 
Den YOST, RN Cardiac Device Clinic Coordinator [facility] [address] [place] [address] 
[phone]

## 2021-01-16 ENCOUNTER — HOSPITAL ENCOUNTER (OUTPATIENT)
Dept: PREADMISSION TESTING | Age: 76
Discharge: HOME | End: 2021-01-16
Payer: MEDICARE

## 2021-01-16 PROCEDURE — U0003 INFECTIOUS AGENT DETECTION BY NUCLEIC ACID (DNA OR RNA); SEVERE ACUTE RESPIRATORY SYNDROME CORONAVIRUS 2 (SARS-COV-2) (CORONAVIRUS DISEASE [COVID-19]), AMPLIFIED PROBE TECHNIQUE, MAKING USE OF HIGH THROUGHPUT TECHNOLOGIES AS DESCRIBED BY CMS-2020-01-R: HCPCS

## 2021-01-17 LAB — SARS-COV-2, COV2NT: NOT DETECTED

## 2021-01-18 NOTE — PROGRESS NOTES
c/ABT pacer remote    Normal & appropriate pacer function. 2 high ventricular rates detected - longest on 10/1/2020 lasting 9 min 28 sec. EGMS show VT v. RVR from known afib - V-V intervals irregular. See scanned document for details.

## 2021-01-19 ENCOUNTER — ANESTHESIA EVENT (OUTPATIENT)
Dept: SURGERY | Age: 76
End: 2021-01-19
Payer: MEDICARE

## 2021-01-20 ENCOUNTER — HOSPITAL ENCOUNTER (OUTPATIENT)
Age: 76
Setting detail: OUTPATIENT SURGERY
Discharge: HOME | End: 2021-01-20
Attending: ORTHOPAEDIC SURGERY | Admitting: ORTHOPAEDIC SURGERY
Payer: MEDICARE

## 2021-01-20 ENCOUNTER — ANESTHESIA (OUTPATIENT)
Dept: SURGERY | Age: 76
End: 2021-01-20
Payer: MEDICARE

## 2021-01-20 VITALS
WEIGHT: 215.83 LBS | HEART RATE: 66 BPM | SYSTOLIC BLOOD PRESSURE: 120 MMHG | TEMPERATURE: 97.7 F | DIASTOLIC BLOOD PRESSURE: 73 MMHG | RESPIRATION RATE: 16 BRPM | OXYGEN SATURATION: 97 % | HEIGHT: 74 IN | BODY MASS INDEX: 27.7 KG/M2

## 2021-01-20 PROCEDURE — 76210000040 HC AMBSU PH I REC FIRST 0.5 HR: Performed by: ORTHOPAEDIC SURGERY

## 2021-01-20 PROCEDURE — 76060000063 HC AMB SURG ANES 1.5 TO 2 HR: Performed by: ORTHOPAEDIC SURGERY

## 2021-01-20 PROCEDURE — 77030002966 HC SUT PDS J&J -A: Performed by: ORTHOPAEDIC SURGERY

## 2021-01-20 PROCEDURE — 76030000003 HC AMB SURG OR TIME 1.5 TO 2: Performed by: ORTHOPAEDIC SURGERY

## 2021-01-20 PROCEDURE — 77030008833 HC WRE K BRSM -A: Performed by: ORTHOPAEDIC SURGERY

## 2021-01-20 PROCEDURE — 77030040922 HC BLNKT HYPOTHRM STRY -A

## 2021-01-20 PROCEDURE — 76210000046 HC AMBSU PH II REC FIRST 0.5 HR: Performed by: ORTHOPAEDIC SURGERY

## 2021-01-20 PROCEDURE — A4565 SLINGS: HCPCS

## 2021-01-20 PROCEDURE — 74011000250 HC RX REV CODE- 250: Performed by: ORTHOPAEDIC SURGERY

## 2021-01-20 PROCEDURE — 77030003882 HC BIT DRL TWST BRSM -B: Performed by: ORTHOPAEDIC SURGERY

## 2021-01-20 PROCEDURE — 77030006689 HC BLD OPHTH BVR BD -A: Performed by: ORTHOPAEDIC SURGERY

## 2021-01-20 PROCEDURE — 77030003601 HC NDL NRV BLK BBMI -A

## 2021-01-20 PROCEDURE — 77030018836 HC SOL IRR NACL ICUM -A: Performed by: ORTHOPAEDIC SURGERY

## 2021-01-20 PROCEDURE — 77030002916 HC SUT ETHLN J&J -A: Performed by: ORTHOPAEDIC SURGERY

## 2021-01-20 PROCEDURE — 74011250636 HC RX REV CODE- 250/636

## 2021-01-20 PROCEDURE — 77030006690 HC BLD OPHTH BVR BD -B: Performed by: ORTHOPAEDIC SURGERY

## 2021-01-20 PROCEDURE — 74011000272 HC RX REV CODE- 272: Performed by: ORTHOPAEDIC SURGERY

## 2021-01-20 PROCEDURE — 74011250636 HC RX REV CODE- 250/636: Performed by: ANESTHESIOLOGY

## 2021-01-20 PROCEDURE — 77030002996 HC SUT SLK J&J -A: Performed by: ORTHOPAEDIC SURGERY

## 2021-01-20 PROCEDURE — 77030000032 HC CUF TRNQT ZIMM -B: Performed by: ORTHOPAEDIC SURGERY

## 2021-01-20 PROCEDURE — 88304 TISSUE EXAM BY PATHOLOGIST: CPT

## 2021-01-20 PROCEDURE — 77030006773 HC BLD SAW OSC BRSM -A: Performed by: ORTHOPAEDIC SURGERY

## 2021-01-20 PROCEDURE — 74011250636 HC RX REV CODE- 250/636: Performed by: NURSE ANESTHETIST, CERTIFIED REGISTERED

## 2021-01-20 PROCEDURE — 77030040356 HC CORD BPLR FRCP COVD -A: Performed by: ORTHOPAEDIC SURGERY

## 2021-01-20 PROCEDURE — 2709999900 HC NON-CHARGEABLE SUPPLY: Performed by: ORTHOPAEDIC SURGERY

## 2021-01-20 PROCEDURE — 74011250636 HC RX REV CODE- 250/636: Performed by: ORTHOPAEDIC SURGERY

## 2021-01-20 PROCEDURE — 77030040361 HC SLV COMPR DVT MDII -B

## 2021-01-20 PROCEDURE — 77030002922 HC SUT FBRWRE ARTH -B: Performed by: ORTHOPAEDIC SURGERY

## 2021-01-20 DEVICE — K WIRE FIX L6IN DIA1.1MM ST S STL 3 SIDE DBL TRCR BOTH END: Type: IMPLANTABLE DEVICE | Status: FUNCTIONAL

## 2021-01-20 RX ORDER — PROPOFOL 10 MG/ML
INJECTION, EMULSION INTRAVENOUS
Status: DISCONTINUED | OUTPATIENT
Start: 2021-01-20 | End: 2021-01-20 | Stop reason: HOSPADM

## 2021-01-20 RX ORDER — ROPIVACAINE HYDROCHLORIDE 5 MG/ML
INJECTION, SOLUTION EPIDURAL; INFILTRATION; PERINEURAL AS NEEDED
Status: DISCONTINUED | OUTPATIENT
Start: 2021-01-20 | End: 2021-01-20 | Stop reason: HOSPADM

## 2021-01-20 RX ORDER — SODIUM CHLORIDE, SODIUM LACTATE, POTASSIUM CHLORIDE, CALCIUM CHLORIDE 600; 310; 30; 20 MG/100ML; MG/100ML; MG/100ML; MG/100ML
125 INJECTION, SOLUTION INTRAVENOUS CONTINUOUS
Status: DISCONTINUED | OUTPATIENT
Start: 2021-01-20 | End: 2021-01-20 | Stop reason: HOSPADM

## 2021-01-20 RX ORDER — HYDROMORPHONE HYDROCHLORIDE 1 MG/ML
.25-1 INJECTION, SOLUTION INTRAMUSCULAR; INTRAVENOUS; SUBCUTANEOUS
Status: DISCONTINUED | OUTPATIENT
Start: 2021-01-20 | End: 2021-01-20 | Stop reason: HOSPADM

## 2021-01-20 RX ORDER — FENTANYL CITRATE 50 UG/ML
INJECTION, SOLUTION INTRAMUSCULAR; INTRAVENOUS AS NEEDED
Status: DISCONTINUED | OUTPATIENT
Start: 2021-01-20 | End: 2021-01-20 | Stop reason: HOSPADM

## 2021-01-20 RX ORDER — SODIUM CHLORIDE 0.9 % (FLUSH) 0.9 %
5-40 SYRINGE (ML) INJECTION EVERY 8 HOURS
Status: DISCONTINUED | OUTPATIENT
Start: 2021-01-20 | End: 2021-01-20 | Stop reason: HOSPADM

## 2021-01-20 RX ORDER — MIDAZOLAM HYDROCHLORIDE 1 MG/ML
INJECTION, SOLUTION INTRAMUSCULAR; INTRAVENOUS AS NEEDED
Status: DISCONTINUED | OUTPATIENT
Start: 2021-01-20 | End: 2021-01-20 | Stop reason: HOSPADM

## 2021-01-20 RX ORDER — NALOXONE HYDROCHLORIDE 0.4 MG/ML
0.4 INJECTION, SOLUTION INTRAMUSCULAR; INTRAVENOUS; SUBCUTANEOUS
Status: DISCONTINUED | OUTPATIENT
Start: 2021-01-20 | End: 2021-01-20 | Stop reason: HOSPADM

## 2021-01-20 RX ORDER — SODIUM CHLORIDE 9 MG/ML
25 INJECTION, SOLUTION INTRAVENOUS AS NEEDED
Status: DISCONTINUED | OUTPATIENT
Start: 2021-01-20 | End: 2021-01-20 | Stop reason: HOSPADM

## 2021-01-20 RX ORDER — SODIUM CHLORIDE 0.9 % (FLUSH) 0.9 %
5-40 SYRINGE (ML) INJECTION AS NEEDED
Status: DISCONTINUED | OUTPATIENT
Start: 2021-01-20 | End: 2021-01-20 | Stop reason: HOSPADM

## 2021-01-20 RX ORDER — FLUMAZENIL 0.1 MG/ML
0.2 INJECTION INTRAVENOUS
Status: DISCONTINUED | OUTPATIENT
Start: 2021-01-20 | End: 2021-01-20 | Stop reason: HOSPADM

## 2021-01-20 RX ORDER — LIDOCAINE HYDROCHLORIDE 10 MG/ML
0.1 INJECTION, SOLUTION EPIDURAL; INFILTRATION; INTRACAUDAL; PERINEURAL AS NEEDED
Status: DISCONTINUED | OUTPATIENT
Start: 2021-01-20 | End: 2021-01-20 | Stop reason: HOSPADM

## 2021-01-20 RX ADMIN — SODIUM CHLORIDE, POTASSIUM CHLORIDE, SODIUM LACTATE AND CALCIUM CHLORIDE: 600; 310; 30; 20 INJECTION, SOLUTION INTRAVENOUS at 08:26

## 2021-01-20 RX ADMIN — MIDAZOLAM HYDROCHLORIDE 1 MG: 2 INJECTION, SOLUTION INTRAMUSCULAR; INTRAVENOUS at 08:21

## 2021-01-20 RX ADMIN — ROPIVACAINE HYDROCHLORIDE 40 ML: 5 INJECTION, SOLUTION EPIDURAL; INFILTRATION; PERINEURAL at 08:21

## 2021-01-20 RX ADMIN — MIDAZOLAM HYDROCHLORIDE 2 MG: 2 INJECTION, SOLUTION INTRAMUSCULAR; INTRAVENOUS at 09:12

## 2021-01-20 RX ADMIN — PROPOFOL 50 MCG/KG/MIN: 10 INJECTION, EMULSION INTRAVENOUS at 09:12

## 2021-01-20 RX ADMIN — FENTANYL CITRATE 50 MCG: 0.05 INJECTION, SOLUTION INTRAMUSCULAR; INTRAVENOUS at 08:21

## 2021-01-20 RX ADMIN — CEFAZOLIN SODIUM 2 G: 1 POWDER, FOR SOLUTION INTRAMUSCULAR; INTRAVENOUS at 09:19

## 2021-01-20 RX ADMIN — MIDAZOLAM HYDROCHLORIDE 1 MG: 2 INJECTION, SOLUTION INTRAMUSCULAR; INTRAVENOUS at 08:24

## 2021-01-20 NOTE — ANESTHESIA POSTPROCEDURE EVALUATION
Procedure(s):  LEFT THUMB SCAPHOTRAPEXIOTRAPEXOIDAL ARTHROPLASTY (REGIONAL BLOCK ARROWHEAD [facility]). MAC, regional    Anesthesia Post Evaluation      Multimodal analgesia: multimodal analgesia used between 6 hours prior to anesthesia start to PACU discharge  Patient location during evaluation: bedside  Patient participation: complete - patient participated  Level of consciousness: awake  Pain management: adequate  Airway patency: patent  Anesthetic complications: no  Cardiovascular status: acceptable  Respiratory status: acceptable  Hydration status: acceptable        INITIAL Post-op Vital signs:   Vitals Value Taken Time   /70 01/20/21 1125   Temp 36.4 °C (97.6 °F) 01/20/21 1115   Pulse 68 01/20/21 1128   Resp 18 01/20/21 1128   SpO2 98 % 01/20/21 1128   Vitals shown include unvalidated device data.

## 2021-01-20 NOTE — ANESTHESIA PROCEDURE NOTES
Peripheral Block    Start time: 1/20/2021 8:21 AM  End time: 1/20/2021 8:28 AM  Performed by: Roz Lopez MD  Authorized by: Roz Lopez MD       Pre-procedure:    Indications: at surgeon's request and post-op pain management    Preanesthetic Checklist: patient identified, risks and benefits discussed, site marked, timeout performed, anesthesia consent given and patient being monitored    Timeout Time: 08:21          Block Type:   Block Type:  Axillary  Laterality:  Left  Monitoring:  Continuous pulse ox, frequent vital sign checks, heart rate, responsive to questions and oxygen  Injection Technique:  Single shot  Procedures: ultrasound guided and nerve stimulator    Patient Position: supine  Prep: chlorhexidine    Location:  Interscalene  Needle Type:  Stimuplex  Needle Gauge:  22 G  Needle Localization:  Nerve stimulator and ultrasound guidance    Assessment:  Number of attempts:  1  Injection Assessment:  Incremental injection every 5 mL, local visualized surrounding nerve on ultrasound, negative aspiration for blood, no paresthesia and no intravascular symptoms  Patient tolerance:  Patient tolerated the procedure well with no immediate complications

## 2021-01-20 NOTE — DISCHARGE INSTRUCTIONS
DISCHARGE SUMMARY from your Nurse      PATIENT INSTRUCTIONS    After general anesthesia or intravenous sedation, for 24 hours or while taking prescription Narcotics:  · Limit your activities  · Do not drive and operate hazardous machinery  · Do not make important personal or business decisions  · Do  not drink alcoholic beverages  · If you have not urinated within 8 hours after discharge, please contact your surgeon on call. Report the following to your surgeon:  · Excessive pain, swelling, redness or odor of or around the surgical area  · Temperature over 100.5  · Nausea and vomiting lasting longer than 4 hours or if unable to take medications  · Any signs of decreased circulation or nerve impairment to extremity: change in color, persistent  numbness, tingling, coldness or increase pain  · Any questions      GOOD HELP TO FIGHT AN INFECTION  Here are a few tip to help reduce the chance of getting an infection after surgery:   Wash Your Hands   Good handwashing is the most important thing you and your caregiver can do.  Wash before and after caring for any wounds. Dry your hand with a clean towel.  Wash with soap and water for at least 20 seconds. A TIP: sing the \"Happy Birthday\" song through one time while washing to help with the timing.  Use a hand  in between washings.  Shower   When your surgeon says it is OK to take a shower, use a new bar of antibacterial soap (if that is what you use, and keep that bar of soap ONLY for your use), or antibacterial body wash.  Use a clean wash cloth or sponge when you bathe.  Dry off with a clean towel  after every bath - be careful around any wounds, skin staples, sutures or surgical glue over/on wounds.  Do not enter swimming pools, hot tubs, lakes, rivers and/or ocean until wounds are healed and your doctor/surgeon says it is OK.  Use Clean Sheets   Sleep on freshly laundered sheets after your surgery.    Keep the surgery site covered with a clean, dry bandage (if instructed to do so). If the bandage becomes soiled, reapply a new, dry, clean bandage.  Do not allow pets to sleep with you while your wound is healing.  Lifestyle Modification and Controlling Your Blood Sugar   Smoking slows wound healing. Stop smoking and limit exposure to second-hand smoke.  High blood sugar slows wound healing. Eat a well-balanced diet to provide proper nutrition while healing   Monitor your blood sugar (if you are a diabetic) and take your medications as you are suppose to so you can control you blood sugar after surgery. COUGH AND DEEP BREATHE    Breathing deeply and coughing are very important exercises to do after surgery. Deep breathing and coughing open the little air tubes and air sacks in your lungs. You take deep breaths every day. You may not even notice - it is just something you do when you sigh or yawn. It is a natural exercise you do to keep these air passages open. After surgery, take deep breaths and cough, on purpose. DIRECTIONS:  · Take 10 to 15 slow deep breaths every hour while awake. · Breathe in deeply, and hold it for 2 seconds. · Exhale slowly through puckered lips, like blowing up a balloon. · After every 4th or 5th deep breath, hug your pillow to your chest or belly and give a hard, deep cough. Yes, it will probably hurt. But doing this exercise is a very important part of healing after surgery. Take your pain medicine to help you do this exercise without too much pain. Coughing and deep breathing help prevent bronchitis and pneumonia after surgery. If you had chest or belly surgery, use a pillow as a \"hug jayson\" and hold it tightly to your chest or belly when you cough. ANKLE PUMPS    Ankle pumps increase the circulation of oxygenated blood to your lower extremities and decrease your risk for circulation problems such as blood clots.  They also stretch the muscles, tendons and ligaments in your foot and ankle, and prevent joint contracture in the ankle and foot, especially after surgeries on the legs. It is important to do ankle pump exercises regularly after surgery because immobility increases your risk for developing a blood clot. Your doctor may also have you take an Aspirin for the next few days as well. If your doctor did not ask you to take an Aspirin, consult with him before starting Aspirin therapy on your own. The exercise is quite simple. · Slowly point your foot forward, feeling the muscles on the top of your lower leg stretch, and hold this position for 5 seconds. · Next, pull your foot back toward you as far as possible, stretching the calf muscles, and hold that position for 5 seconds. · Repeat with the other foot. · Perform 10 repetitions every hour while awake for both ankles if possible (down and then up with the foot once is one repetition). You should feel gentle stretching of the muscles in your lower leg when doing this exercise. If you feel pain, or your range of motion is limited, don't push too hard. Only go the limit your joint and muscles will let you go. If you have increasing pain, progressively worsening leg warmth or swelling, STOP the exercise and call your doctor. MEDICATION AND   SIDE EFFECT GUIDE    The [facility] MEDICATION AND SIDE EFFECT GUIDE was provided to the PATIENT AND CARE PROVIDER. Information provided includes instruction about drug purpose and common side effects for the following medications:   · Conchis Romano  · Anastasia Aguilar  · PERCOCET         These are general instructions for a healthy lifestyle:    *   Please give a list of your current medications to your Primary Care Provider. *   Please update this list whenever your medications are discontinued, doses are changed, or new medications (including over-the-counter products) are added.   *   Please carry medication information at all times in case of emergency situations. About Smoking  No smoking / No tobacco products  Avoid exposure to second hand smoke     Surgeon General's Warning:  Quitting smoking now greatly reduces serious risk to your health. Obesity, smoking, and sedentary lifestyle greatly increases your risk for illness and disease. A healthy diet, regular physical exercise & weight monitoring are important for maintaining a healthy lifestyle. Congestive Heart Failure  You may be retaining fluid if you have a history of heart failure or if you experience any of the following symptoms:  Weight gain of 3 pounds or more overnight or 5 pounds in a week, increased swelling in your hands or feet or shortness of breath while lying flat in bed. Please call your doctor as soon as you notice any of these symptoms; do not wait until your next office visit. Recognize signs and symptoms of STROKE:  F -  Face looks uneven  A -  Arms unable to move or move evenly  S -  Speech slurred or non-existent  T -  Time-call 911 as soon as signs and symptoms begin-DO NOT go          back to bed or wait to see if you get better-TIME IS BRAIN. Warning Signs of HEART ATTACK   Call 911 if you have these symptoms:     Chest discomfort. Most heart attacks involve discomfort in the center of the chest that lasts more than a few minutes, or that goes away and comes back. It can feel like uncomfortable pressure, squeezing, fullness, or pain.  Discomfort in other areas of the upper body. Symptoms can include pain or discomfort in one or both arms, the back, neck, jaw, or stomach.  Shortness of breath with or without chest discomfort.  Other signs may include breaking out in a cold sweat, nausea, or lightheadedness. Don't wait more than five minutes to call 911 - MINUTES MATTER! Fast action can save your life. Calling 911 is almost always the fastest way to get lifesaving treatment.  Emergency Medical Services staff can begin treatment when they arrive -- up to an hour sooner than if someone gets to the hospital by car. Learning About Coronavirus [phone])  Coronavirus [phone]): Overview  What is coronavirus (COVID-19)? The coronavirus disease (COVID-19) is caused by a virus. It is an illness that was first found in [place], [place], in December 2019. It has since spread worldwide. The virus can cause fever, cough, and trouble breathing. In severe cases, it can cause pneumonia and make it hard to breathe without help. It can cause death. Coronaviruses are a large group of viruses. They cause the common cold. They also cause more serious illnesses like Middle East respiratory syndrome (MERS) and severe acute respiratory syndrome (SARS). COVID-19 is caused by a novel coronavirus. That means it's a new type that has not been seen in people before. This virus spreads person-to-person through droplets from coughing and sneezing. It can also spread when you are close to someone who is infected. And it can spread when you touch something that has the virus on it, such as a doorknob or a tabletop. What can you do to protect yourself from coronavirus (COVID-19)? The best way to protect yourself from getting sick is to:  · Avoid areas where there is an outbreak. · Avoid contact with people who may be infected. · Wash your hands often with soap or alcohol-based hand sanitizers. · Avoid crowds and try to stay at least 6 feet away from other people. · Wash your hands often, especially after you cough or sneeze. Use soap and water, and scrub for at least 20 seconds. If soap and water aren't available, use an alcohol-based hand . · Avoid touching your mouth, nose, and eyes. What can you do to avoid spreading the virus to others? To help avoid spreading the virus to others:  · Cover your mouth with a tissue when you cough or sneeze. Then throw the tissue in the trash.   · Use a disinfectant to clean things that you touch often.  · Stay home if you are sick or have been exposed to the virus. Don't go to school, work, or public areas. And don't use public transportation. · If you are sick:  ? Leave your home only if you need to get medical care. But call the doctor's office first so they know you're coming. And wear a face mask, if you have one.  ? If you have a face mask, wear it whenever you're around other people. It can help stop the spread of the virus when you cough or sneeze. ? Clean and disinfect your home every day. Use household  and disinfectant wipes or sprays. Take special care to clean things that you grab with your hands. These include doorknobs, remote controls, phones, and handles on your refrigerator and microwave. And don't forget countertops, tabletops, bathrooms, and computer keyboards. When to call for help  Call 911 anytime you think you may need emergency care. For example, call if:  · You have severe trouble breathing. (You can't talk at all.)  · You have constant chest pain or pressure. · You are severely dizzy or lightheaded. · You are confused or can't think clearly. · Your face and lips have a blue color. · You pass out (lose consciousness) or are very hard to wake up. Call your doctor now if you develop symptoms such as:  · Shortness of breath. · Fever. · Cough. If you need to get care, call ahead to the doctor's office for instructions before you go. Make sure you wear a face mask, if you have one, to prevent exposing other people to the virus. Where can you get the latest information? The following health organizations are tracking and studying this virus. Their websites contain the most up-to-date information. Ryleeita Dakin also learn what to do if you think you may have been exposed to the virus. · U.S. Centers for Disease Control and Prevention (CDC): The CDC provides updated news about the disease and travel advice. The website also tells you how to prevent the spread of infection. www.cdc.gov  · World Health Organization [facility]): WHO offers information about the virus outbreaks. WHO also has travel advice. www.who.int  Current as of: April 1, 2020               Content Version: 12.4  © 2006-2020 [employer]. Care instructions adapted under license by your healthcare professional. If you have questions about a medical condition or this instruction, always ask your healthcare professional. [address] any warranty or liability for your use of this information. The discharge information has been reviewed with the spouse. Any questions and concerns from the spouse have been addressed. The spouse verbalized understanding. The following personal items collected during your admission are returned to you:   Dental Appliance: Dental Appliances: None  Vision:    Hearing Aid:    Jewelry: Jewelry: None  Clothing: Clothing: Footwear, Pants, Shirt, Undergarments  Other Valuables: Other Valuables: None  Valuables sent to safe:                  Going Home After A  Peripheral Nerve Block    Patient Information    The anesthesiology team has provided for your pain control through a technique called regional anesthesia. As the name implies, anesthesia (decreased or no pain, sensation, or motor control) has been provided to a specific region, whether that be an arm or a leg. How does this happen?  you might ask. While you were sleepy, the anesthesia provider provided medicine to a specific group of nerves either in the neck/shoulder region or in the groin and/or buttock region, similar to the way a dentist might numb a tooth (teeth.)  They typically use an ultrasound machine to know where the medicine is placed in relation to the nerves they wish to numb up or block.   What this means is that for the next few hours, you should expect to have a numb limb.     Below are some things we wish for you to read and be familiar with concerning your anesthetized limb.    Caring For a Blocked Body Part    General Considerations:   The numbness may last up to 24 hours   You must protect your arm or leg. The blocked extremity is numb, weak, and difficult for your brain to locate and communicate with. To do this you should:  o Pay attention to the position of the blocked limb at all times. o Be very careful when placing hot or cod items on a numb limb. You could cause tissue damage like burns or frostbite if you are unable to feel temperature. Carefully follow your discharge instructions regarding the use of these therapies in you post-operative care. o Carefully pad the affected limb. Normally we continually move and adjust the position of our bodies without thinking about it. This movement and continuous repositioning helps to prevent injuries from immobility. When a limb is numb it still requires this care  o Be extremely careful not to bump or hit the numb body part. This can result in an unrecognized injury, at lease until the blocked limb wakes up. It can also result in worse pain of your already post-surgical limb. Arm/Shoulder Blocks:   You may experience a droopy eyelid, nasal stuffiness, and redness of the eye after receiving an arm/shoulder block. This is called Jans Syndrome, and is very common. There is no need for concern. You may also experience mild hoarseness, but all of these symptoms should resolve within 24 hours.  Some patients may experience mild shortness of breath. A sitting position will help alleviate this and it should resolve within 24 hours. If you experience significant or progressive worsening of the shortness of breath, seek medical attention immediately. Contact Phone Numbers    CALL 911 IN CASE OF AN EMERGENCY. For all other non-emergency inquiries call the [facility]  at [phone] and ask for the anesthesiologist on call to be paged.

## 2021-01-20 NOTE — ANESTHESIA PREPROCEDURE EVALUATION
Anesthetic History   No history of anesthetic complications            Review of Systems / Medical History  Patient summary reviewed    Pulmonary  Within defined limits                 Neuro/Psych       CVA      Comments: CVA affected left eye only Cardiovascular            Dysrhythmias   Pacemaker and hyperlipidemia    Exercise tolerance: >4 METS  Comments: Underlying rhythm is a-fib with megan-tachy syndrome, corrected with pacemaker    EF 60-65%, normal stress 2/20   GI/Hepatic/Renal     GERD: well controlled    Renal disease: CRI       Endo/Other        Arthritis     Other Findings              Physical Exam    Airway  Mallampati: II  TM Distance: 4 - 6 cm  Neck ROM: normal range of motion   Mouth opening: Normal     Cardiovascular    Rhythm: regular  Rate: normal         Dental    Dentition: Implants and Bridges  Comments: Permanent implants/ bridges   Pulmonary  Breath sounds clear to auscultation               Abdominal         Other Findings            Anesthetic Plan    ASA: 3  Anesthesia type: MAC and regional - supraclavicular block      Post-op pain plan if not by surgeon: peripheral nerve block single    Induction: Intravenous  Anesthetic plan and risks discussed with: Patient

## 2021-02-24 DIAGNOSIS — J30.9 ALLERGIC RHINITIS, UNSPECIFIED SEASONALITY, UNSPECIFIED TRIGGER: ICD-10-CM

## 2021-02-24 RX ORDER — IPRATROPIUM BROMIDE 21 UG/1
SPRAY, METERED NASAL
Qty: 30 ML | Refills: 3 | Status: SHIPPED | OUTPATIENT
Start: 2021-02-24 | End: 2022-02-21

## 2021-03-25 ENCOUNTER — OFFICE VISIT (OUTPATIENT)
Dept: INTERNAL MEDICINE CLINIC | Age: 76
End: 2021-03-25
Payer: MEDICARE

## 2021-03-25 VITALS
HEIGHT: 74 IN | BODY MASS INDEX: 28.11 KG/M2 | HEART RATE: 76 BPM | OXYGEN SATURATION: 97 % | DIASTOLIC BLOOD PRESSURE: 79 MMHG | TEMPERATURE: 97.5 F | SYSTOLIC BLOOD PRESSURE: 130 MMHG | RESPIRATION RATE: 18 BRPM | WEIGHT: 219 LBS

## 2021-03-25 DIAGNOSIS — N40.0 BENIGN PROSTATIC HYPERPLASIA, UNSPECIFIED WHETHER LOWER URINARY TRACT SYMPTOMS PRESENT: ICD-10-CM

## 2021-03-25 DIAGNOSIS — M17.0 OSTEOARTHRITIS OF BOTH KNEES, UNSPECIFIED OSTEOARTHRITIS TYPE: ICD-10-CM

## 2021-03-25 DIAGNOSIS — Z71.89 ADVANCED DIRECTIVES, COUNSELING/DISCUSSION: ICD-10-CM

## 2021-03-25 DIAGNOSIS — K58.0 IRRITABLE BOWEL SYNDROME WITH DIARRHEA: ICD-10-CM

## 2021-03-25 DIAGNOSIS — E78.5 DYSLIPIDEMIA: ICD-10-CM

## 2021-03-25 DIAGNOSIS — Z00.00 MEDICARE ANNUAL WELLNESS VISIT, SUBSEQUENT: Primary | ICD-10-CM

## 2021-03-25 DIAGNOSIS — D69.6 THROMBOCYTOPENIA (HCC): ICD-10-CM

## 2021-03-25 LAB
ALBUMIN SERPL-MCNC: 4.1 G/DL (ref 3.5–5)
ALBUMIN/GLOB SERPL: 1.4 {RATIO} (ref 1.1–2.2)
ALP SERPL-CCNC: 113 U/L (ref 45–117)
ALT SERPL-CCNC: 24 U/L (ref 12–78)
ANION GAP SERPL CALC-SCNC: 5 MMOL/L (ref 5–15)
AST SERPL-CCNC: 25 U/L (ref 15–37)
BASOPHILS # BLD: 0 K/UL (ref 0–0.1)
BASOPHILS NFR BLD: 0 % (ref 0–1)
BILIRUB SERPL-MCNC: 1.5 MG/DL (ref 0.2–1)
BUN SERPL-MCNC: 20 MG/DL (ref 6–20)
BUN/CREAT SERPL: 20 (ref 12–20)
CALCIUM SERPL-MCNC: 9 MG/DL (ref 8.5–10.1)
CHLORIDE SERPL-SCNC: 108 MMOL/L (ref 97–108)
CHOLEST SERPL-MCNC: 135 MG/DL
CO2 SERPL-SCNC: 29 MMOL/L (ref 21–32)
CREAT SERPL-MCNC: 1 MG/DL (ref 0.7–1.3)
DIFFERENTIAL METHOD BLD: ABNORMAL
EOSINOPHIL # BLD: 0 K/UL (ref 0–0.4)
EOSINOPHIL NFR BLD: 0 % (ref 0–7)
ERYTHROCYTE [DISTWIDTH] IN BLOOD BY AUTOMATED COUNT: 12.9 % (ref 11.5–14.5)
GLOBULIN SER CALC-MCNC: 3 G/DL (ref 2–4)
GLUCOSE SERPL-MCNC: 103 MG/DL (ref 65–100)
HCT VFR BLD AUTO: 48.4 % (ref 36.6–50.3)
HDLC SERPL-MCNC: 63 MG/DL
HDLC SERPL: 2.1 {RATIO} (ref 0–5)
HGB BLD-MCNC: 16.1 G/DL (ref 12.1–17)
IMM GRANULOCYTES # BLD AUTO: 0 K/UL (ref 0–0.04)
IMM GRANULOCYTES NFR BLD AUTO: 0 % (ref 0–0.5)
LDLC SERPL CALC-MCNC: 64.2 MG/DL (ref 0–100)
LIPID PROFILE,FLP: NORMAL
LYMPHOCYTES # BLD: 0.9 K/UL (ref 0.8–3.5)
LYMPHOCYTES NFR BLD: 13 % (ref 12–49)
MCH RBC QN AUTO: 30.3 PG (ref 26–34)
MCHC RBC AUTO-ENTMCNC: 33.3 G/DL (ref 30–36.5)
MCV RBC AUTO: 91 FL (ref 80–99)
MONOCYTES # BLD: 0.4 K/UL (ref 0–1)
MONOCYTES NFR BLD: 5 % (ref 5–13)
NEUTS SEG # BLD: 5.7 K/UL (ref 1.8–8)
NEUTS SEG NFR BLD: 82 % (ref 32–75)
NRBC # BLD: 0 K/UL (ref 0–0.01)
NRBC BLD-RTO: 0 PER 100 WBC
PLATELET # BLD AUTO: 156 K/UL (ref 150–400)
PMV BLD AUTO: 9.9 FL (ref 8.9–12.9)
POTASSIUM SERPL-SCNC: 4.8 MMOL/L (ref 3.5–5.1)
PROT SERPL-MCNC: 7.1 G/DL (ref 6.4–8.2)
RBC # BLD AUTO: 5.32 M/UL (ref 4.1–5.7)
SODIUM SERPL-SCNC: 142 MMOL/L (ref 136–145)
TRIGL SERPL-MCNC: 39 MG/DL (ref ?–150)
VLDLC SERPL CALC-MCNC: 7.8 MG/DL
WBC # BLD AUTO: 7 K/UL (ref 4.1–11.1)

## 2021-03-25 PROCEDURE — 1100F PTFALLS ASSESS-DOCD GE2>/YR: CPT | Performed by: INTERNAL MEDICINE

## 2021-03-25 PROCEDURE — G8427 DOCREV CUR MEDS BY ELIG CLIN: HCPCS | Performed by: INTERNAL MEDICINE

## 2021-03-25 PROCEDURE — 99214 OFFICE O/P EST MOD 30 MIN: CPT | Performed by: INTERNAL MEDICINE

## 2021-03-25 PROCEDURE — G0439 PPPS, SUBSEQ VISIT: HCPCS | Performed by: INTERNAL MEDICINE

## 2021-03-25 PROCEDURE — G8419 CALC BMI OUT NRM PARAM NOF/U: HCPCS | Performed by: INTERNAL MEDICINE

## 2021-03-25 PROCEDURE — 3288F FALL RISK ASSESSMENT DOCD: CPT | Performed by: INTERNAL MEDICINE

## 2021-03-25 PROCEDURE — G8510 SCR DEP NEG, NO PLAN REQD: HCPCS | Performed by: INTERNAL MEDICINE

## 2021-03-25 PROCEDURE — G8536 NO DOC ELDER MAL SCRN: HCPCS | Performed by: INTERNAL MEDICINE

## 2021-03-25 RX ORDER — DICYCLOMINE HYDROCHLORIDE 10 MG/1
10 CAPSULE ORAL
Qty: 90 CAP | Refills: 2 | Status: SHIPPED | OUTPATIENT
Start: 2021-03-25 | End: 2022-03-04 | Stop reason: SDUPTHER

## 2021-03-25 NOTE — PROGRESS NOTES
Quintin Butterfield is a 66 y.o. male who presents today for Complete Physical  .      He has a history of   Patient Active Problem List   Diagnosis Code    OA (osteoarthritis) of knee M17.10    Skin moles D22.9    Colon polyp K63.5    AR (allergic rhinitis) J30.9    Thyroid nodule E04.1    BPH (benign prostatic hyperplasia) N40.0    Atrial fibrillation (HCC) I48.91    Scoliosis M41.9    Family history of prostate cancer Z80.42    Chronic maxillary sinusitis J32.0    James-tachy syndrome (HCC) I49.5    Dyslipidemia E78.5    Elevated Lp(a) E78.41    Cerebral infarction (HCC) I63.9    Bilateral carotid artery disease, unspecified type (HCC) I77.9    Advanced care planning/counseling discussion Z71.89    Gastroesophageal reflux disease without esophagitis K21.9    Cardiac pacemaker in situ Z95.0    Excess skin of eyelid H02.30    Chronic fatigue R53.82    Other chest pain R07.89    Thrombocytopenia (HCC) D69.6    Irritable bowel syndrome with diarrhea K58.0   . Today patient is here for complete physical exam..   he does not have other concerns. Pacer: Patient continues to follow with cardiology. He remains on Pradaxa and the beta-blocker. Status post thumb surgery in January of this year. While he reports that he is doing ok. Had to have a tendon repair as well. Planning on having TKA with Dr Vanna Joshua in June. Has been considering bilateral Knee replacement. We discussed having a second opinion from Dr. Andrea Santiago given the fact that he has bilateral knee osteoarthritis. Hyperlipidemia  On atorvastatin. ROS: taking medications as instructed, no medication side effects noted  No new myalgias, no joint pains, no weakness  No TIA's, no chest pain on exertion, no dyspnea on exertion, no swelling of ankles.    Lab Results   Component Value Date/Time    Cholesterol, total 129 02/18/2020 03:26 PM    HDL Cholesterol 61 02/18/2020 03:26 PM    LDL, calculated 56 02/18/2020 03:26 PM    VLDL, calculated 12 02/18/2020 03:26 PM    Triglyceride 60 02/18/2020 03:26 PM    CHOL/HDL Ratio 2.1 02/18/2020 03:26 PM     Thrombocytopenia: Mild thrombocytopenia last year. We will repeat a CBC this year. IBS: helped by bentyl. Refill. Health maintenance hx includes:  Exercise: moderately active.  Form of exercise: pool aerobic. Diet: generally follows a low fat low cholesterol diet, nonsmoker, alcohol intake none  Social:  Active, water aerobics and bowling.                Retired from [employer].                 Lives at home with wife,   Two daughters and 6 grandchildren.        Cancer screening:    Colon cancer screening:  Last Colonoscopy: 2019 and was normal   Prostate cancer screening: PSA and/or CARLOS: Done with urology. Immunizations:     Immunization History   Administered Date(s) Administered    (RETIRED) Pneumococcal Vaccine (Unspecified Type) 10/24/2011    Covid-19, MODERNA, Mrna, Lnp-s, Pf, 100mcg/0.5mL 03/01/2021    Influenza High Dose Vaccine PF 09/25/2015, 09/30/2016, 10/02/2018    Influenza Vaccine 09/22/2014    Influenza Vaccine (Tri) Adjuvanted (>65 Yrs FLUAD TRI 52877) 10/15/2018, 09/06/2019    Influenza Vaccine (Whole Virus) 10/15/2012    Influenza Vaccine Split 10/24/2011    Pneumococcal Conjugate (PCV-13) 09/25/2015    Pneumococcal Vaccine (Unspecified Type) 10/15/2012    Tdap 01/11/2016    Zoster Recombinant 10/23/2019, 01/10/2020    Zoster Vaccine, Live 07/28/2012      Immunization status: up to date and documented. ROS  Review of Systems   Constitutional: Negative for chills, fever and weight loss. HENT: Negative for congestion and sore throat. Eyes: Negative for blurred vision, double vision and photophobia. Respiratory: Negative for cough and shortness of breath. Cardiovascular: Negative for chest pain, palpitations and leg swelling. Gastrointestinal: Negative for abdominal pain, constipation, diarrhea, heartburn, nausea and vomiting. Genitourinary: Negative for dysuria, frequency and urgency. Musculoskeletal: Positive for joint pain. Negative for myalgias. Skin: Negative for rash. Neurological: Negative. Negative for headaches. Endo/Heme/Allergies: Does not bruise/bleed easily. Psychiatric/Behavioral: Negative for memory loss and suicidal ideas. Visit Vitals  /79   Pulse 76   Temp 97.5 °F (36.4 °C) (Oral)   Resp 18   Ht 6' 2\" (1.88 m)   Wt 219 lb (99.3 kg)   SpO2 97%   BMI 28.12 kg/m²       Physical Exam  Constitutional:       Appearance: He is well-developed. He is not diaphoretic. HENT:      Head: Normocephalic and atraumatic. Eyes:      Pupils: Pupils are equal, round, and reactive to light. Cardiovascular:      Rate and Rhythm: Normal rate and regular rhythm. Heart sounds: No murmur. Pulmonary:      Effort: Pulmonary effort is normal. No respiratory distress. Breath sounds: Normal breath sounds. Musculoskeletal: Normal range of motion. Comments: Healed incision to right wrist.  Minimal amounts of swelling. Skin:     General: Skin is warm and dry. Neurological:      Mental Status: He is alert and oriented to person, place, and time. Psychiatric:         Behavior: Behavior normal.           Current Outpatient Medications   Medication Sig    Pradaxa 150 mg capsule TAKE 1 CAPSULE TWICE A DAY    ipratropium (ATROVENT) 0.03 % nasal spray USE 2 SPRAYS IN THE LEFT NOSTRIL EVERY 12 HOURS    atorvastatin (LIPITOR) 40 mg tablet Take 40 mg by mouth every evening.  loratadine (CLARITIN REDITABS) 10 mg dissolvable tablet Take 10 mg by mouth daily.  metoprolol succinate (Toprol XL) 50 mg XL tablet Take 75 mg by mouth every evening.  omeprazole (PRILOSEC) 20 mg capsule TAKE 1 CAPSULE DAILY    dicyclomine (BENTYL) 10 mg capsule Take 10 mg by mouth as needed.  KRILL OIL PO Take 1 Cap by mouth daily.  VOLTAREN 1 % gel as needed.     B.infantis-B.ani-B.long-B.bifi (PROBIOTIC 4X) 10-15 mg TbEC Take 1 Cap by mouth daily.  melatonin 5 mg cap capsule Take 5 mg by mouth nightly as needed.  acetaminophen (TYLENOL EXTRA STRENGTH) 500 mg tablet Take 2 tablets by mouth three (3) times daily as needed for Pain.  silodosin (RAPAFLO) 8 mg capsule Take 8 mg by mouth daily (with breakfast).  GLUCOSAMINE HCL/CHONDRO DONALDSON A (GLUCOSAMINE-CHONDROITIN PO) Take  by mouth daily.  aspirin delayed-release 81 mg tablet Take 81 mg by mouth every evening.  cholecalciferol, vitamin d3, (VITAMIN D) 1,000 unit tablet Take 2,000 Units by mouth every evening. No current facility-administered medications for this visit.          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 Meggan Francisco HX [address]  06/2017    Right Eye laser treatment     HX VASECTOMY  25-30 yrs ago    SD CARDIAC SURG PROCEDURE UNLIST      pacemaker placement    STRESS TEST CARDIOLITE  4/2008    11 min., normal perfusion, EF 57%    VAS CAROTID DUPLEX BILATERAL  8/15/11    10-49% bilateral, unchanged from one year prior      Social History     Tobacco Use    Smoking status: Never Smoker    Smokeless tobacco: Never Used   Substance Use Topics    Alcohol use: No      Family History   Problem Relation Age of Onset    Stroke Mother     Cancer Father         prostate    Hypertension Father     Diabetes Brother     Cancer Brother         prostate    Diabetes Brother     Hypertension Brother     Cancer Brother         melanoma    Cancer Daughter         melanoma    Anesth Problems Neg Hx         Allergies   Allergen Reactions    Other Plant, Animal, Environmental Angioedema     Entire body edema with fire ants    Milk Containing Products Diarrhea    Venom-Honey Bee Rash     Yellowjackets      Amoxicillin Rash    Clindamycin Hives and Rash    Pcn [Penicillins] Rash     States he is able to tolerate cephalexin with no adverse reaction    Sulfa (Sulfonamide Antibiotics) Rash        Assessment/Plan  Diagnoses and all orders for this visit:    1. Medicare annual wellness visit, subsequent- Didi Romano was counseled on age-appropriate/ guideline-based risk prevention behaviors and screening for a 66y.o. year old   male . We also discussed adjustments in screening based on family history if necessary. Printed instructions for preventative screening guidelines and healthy behaviors given to patient with after visit summary. 2. Advanced directives, counseling/discussion    3. Osteoarthritis of both knees, unspecified osteoarthritis type-we will have discussion with orthopedist to consider bilateral knee replacements at the same time.  -     REFERRAL TO ORTHOPEDICS    4. Dyslipidemia-repeat lipids  -     LIPID PANEL; Future    5. Benign prostatic hyperplasia, unspecified whether lower urinary tract symptoms present-symptoms stable. Sees urologist  -     METABOLIC PANEL, COMPREHENSIVE; Future  -     CBC WITH AUTOMATED DIFF; Future    6. Thrombocytopenia (HCC)-mild.   Repeat  - METABOLIC PANEL, COMPREHENSIVE; Future  -     CBC WITH AUTOMATED DIFF; Future    7. Irritable bowel syndrome with diarrhea-rarely needing dicyclomine. -     dicyclomine (BENTYL) 10 mg capsule; Take 1 Cap by mouth three (3) times daily as needed for Abdominal Cramps or Cramping. Michele Bonner MD  3/25/2021    This note was created with the help of speech recognition software Jeana Sequin) and may contain some 'sound alike' errors. This is the Subsequent Medicare Annual Wellness Exam, performed 12 months or more after the Initial AWV or the last Subsequent AWV    I have reviewed the patient's medical history in detail and updated the computerized patient record. Depression Risk Factor Screening:     3 most recent PHQ Screens 3/25/2021   PHQ Not Done -   Little interest or pleasure in doing things Not at all   Feeling down, depressed, irritable, or hopeless Not at all   Total Score PHQ 2 0   Trouble falling or staying asleep, or sleeping too much -   Feeling tired or having little energy -   Poor appetite, weight loss, or overeating -   Feeling bad about yourself - or that you are a failure or have let yourself or your family down -   Trouble concentrating on things such as school, work, reading, or watching TV -   Moving or speaking so slowly that other people could have noticed; or the opposite being so fidgety that others notice -   Thoughts of being better off dead, or hurting yourself in some way -   PHQ 9 Score -   How difficult have these problems made it for you to do your work, take care of your home and get along with others -       Alcohol Risk Screen    Do you average more than 1 drink per night or more than 7 drinks a week: No    In the past three months have you have had more than 4 drinks containing alcohol on one occasion: No        Functional Ability and Level of Safety:    Hearing: Hearing is good. Activities of Daily Living: The home contains: no safety equipment.   Patient does total self care      Ambulation: with no difficulty     Fall Risk:  Fall Risk Assessment, last 12 mths 3/25/2021   Able to walk? Yes   Fall in past 12 months? 0   Do you feel unsteady? 0   Are you worried about falling 0   Number of falls in past 12 months -   Fall with injury? -      Abuse Screen:  Patient is not abused       Cognitive Screening    Has your family/caregiver stated any concerns about your memory: no       Assessment/Plan   Education and counseling provided:  Are appropriate based on today's review and evaluation  End-of-Life planning (with patient's consent)  Prostate cancer screening tests (PSA, covered annually)  Colorectal cancer screening tests    Diagnoses and all orders for this visit:    1. Medicare annual wellness visit, subsequent    2.  Advanced directives, counseling/discussion        Health Maintenance Due     Health Maintenance Due   Topic Date Due    Hepatitis C Screening  Never done    Lipid Screen  02/18/2021    COVID-19 Vaccine (2 - Moderna 2-dose series) 03/29/2021       Patient Care Team   Patient Care Team:  Mik Braga MD as PCP - General (Internal Medicine)  Mik Braga MD as PCP - [address] [place] [address] Provider  Hola Kidd MD as Surgeon (Orthopedic Surgery)  Lisa Diaz MD (Urology)  Nancy Love MD (Ophthalmology)  Joshua Cruz MD (Orthopedic Surgery)  Lisa Diaz MD (Urology)  Rupesh López MD as Physician (Gastroenterology)  Hedy Rocha MD as Physician (Cardiology)    History     Patient Active Problem List   Diagnosis Code    OA (osteoarthritis) of knee M17.10    Skin moles D22.9    Colon polyp K63.5    AR (allergic rhinitis) J30.9    Thyroid nodule E04.1    BPH (benign prostatic hyperplasia) N40.0    Atrial fibrillation (HCC) I48.91    Scoliosis M41.9    Family history of prostate cancer Z80.42    Chronic maxillary sinusitis J32.0    James-tachy syndrome ([address] I49.5    Dyslipidemia E78.5    Elevated Lp(a) E78.41    Cerebral infarction ([address] I63.9    Bilateral carotid artery disease, unspecified type (HCC) I77.9    Advanced care planning/counseling discussion Z71.89    Gastroesophageal reflux disease without esophagitis K21.9    Cardiac pacemaker in situ Z95.0    Excess skin of eyelid H02.30    Chronic fatigue R53.82    Other chest pain R07.89    Thrombocytopenia (HCC) D69.6    Irritable bowel syndrome with diarrhea K58.0     Past Medical History:   Diagnosis Date    AR (allergic rhinitis) 2/11/2009    Atrial fibrillation ([address] 2003 to present    BPH (benign prostatic hypertrophy)     James-tachy syndrome ([address] 3/27/2012    CAD (coronary artery disease)     Colon polyp 2/11/2009    Dyslipidemia 6/5/2014    GERD (gastroesophageal reflux disease) fall 2013    OA (osteoarthritis) of knee 2/11/2009    Pacemaker     PSA elevation 07/2017    Skin moles 2/11/2009    Stroke ([facility] 2015    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 Sathya Kumar HX [address]  06/2017    Right Eye laser treatment     HX VASECTOMY  25-30 yrs ago    CT CARDIAC SURG PROCEDURE UNLIST      pacemaker placement    STRESS TEST CARDIOLITE  4/2008    11 min., normal perfusion, EF 57%    VAS CAROTID DUPLEX BILATERAL  8/15/11    10-49% bilateral, unchanged from one year prior     Current Outpatient Medications   Medication Sig Dispense Refill    Pradaxa 150 mg capsule TAKE 1 CAPSULE TWICE A  Cap 0    ipratropium (ATROVENT) 0.03 % nasal spray USE 2 SPRAYS IN THE LEFT NOSTRIL EVERY 12 HOURS 30 mL 3    atorvastatin (LIPITOR) 40 mg tablet Take 40 mg by mouth every evening.  loratadine (CLARITIN REDITABS) 10 mg dissolvable tablet Take 10 mg by mouth daily.  metoprolol succinate (Toprol XL) 50 mg XL tablet Take 75 mg by mouth every evening.  omeprazole (PRILOSEC) 20 mg capsule TAKE 1 CAPSULE DAILY 90 Cap 3    dicyclomine (BENTYL) 10 mg capsule Take 10 mg by mouth as needed.  KRILL OIL PO Take 1 Cap by mouth daily.  VOLTAREN 1 % gel as needed.  B.infantis-B.ani-B.long-B.bifi (PROBIOTIC 4X) 10-15 mg TbEC Take 1 Cap by mouth daily.  melatonin 5 mg cap capsule Take 5 mg by mouth nightly as needed.  acetaminophen (TYLENOL EXTRA STRENGTH) 500 mg tablet Take 2 tablets by mouth three (3) times daily as needed for Pain.  silodosin (RAPAFLO) 8 mg capsule Take 8 mg by mouth daily (with breakfast).  GLUCOSAMINE HCL/CHONDRO DONALDSON A (GLUCOSAMINE-CHONDROITIN PO) Take  by mouth daily.  aspirin delayed-release 81 mg tablet Take 81 mg by mouth every evening.  cholecalciferol, vitamin d3, (VITAMIN D) 1,000 unit tablet Take 2,000 Units by mouth every evening.        Allergies   Allergen Reactions    Other Plant, Animal, Environmental Angioedema     Entire body edema with fire ants    Milk Containing Products Diarrhea    Venom-Honey Bee Rash     Yellowjackets      Amoxicillin Rash    Clindamycin Hives and Rash    Pcn [Penicillins] Rash     States he is able to tolerate cephalexin with no adverse reaction    Sulfa (Sulfonamide Antibiotics) Rash       Family History   Problem Relation Age of Onset    Stroke Mother     Cancer Father         prostate    Hypertension Father     Diabetes Brother     Cancer Brother         prostate    Diabetes Brother     Hypertension Brother     Cancer Brother         melanoma    Cancer Daughter         melanoma    Anesth Problems Neg Hx      Social History     Tobacco Use    Smoking status: Never Smoker    Smokeless tobacco: Never Used   Substance Use Topics    Alcohol use:  No

## 2021-03-25 NOTE — PROGRESS NOTES
Annette Robins is a 66 y.o. male  Chief Complaint   Patient presents with    Complete Physical     Health Maintenance Due   Topic Date Due    Hepatitis C Screening  Never done    Medicare Yearly Exam  02/18/2021    Lipid Screen  02/18/2021    COVID-19 Vaccine (2 - Moderna 2-dose series) 03/29/2021     Visit Vitals  /79   Pulse 76   Temp 97.5 °F (36.4 °C) (Oral)   Resp 18   Ht 6' 2\" (1.88 m)   Wt 219 lb (99.3 kg)   SpO2 97%   BMI 28.12 kg/m²

## 2021-03-25 NOTE — ACP (ADVANCE CARE PLANNING)
Advance Care Planning     General Advance Care Planning (ACP) Conversation      Date of Conversation: 3/25/2021  Conducted with: Patient with Decision Making Capacity    Healthcare Decision Maker:     Click here to complete [employer] including selection of the Healthcare Decision Maker Relationship (ie \"Primary\")  Today we documented Decision Maker(s). The patient will provide ACP documents. Content/Action Overview:    Has ACP document(s) NOT on file - requested patient to provide      Length of Voluntary ACP Conversation in minutes:  <16 minutes (Non-Billable)    Leonard Goltz, MD

## 2021-03-25 NOTE — PATIENT INSTRUCTIONS
Medicare Wellness Visit, Male The best way to live healthy is to have a lifestyle where you eat a well-balanced diet, exercise regularly, limit alcohol use, and quit all forms of tobacco/nicotine, if applicable. Regular preventive services are another way to keep healthy. Preventive services (vaccines, screening tests, monitoring & exams) can help personalize your care plan, which helps you manage your own care. Screening tests can find health problems at the earliest stages, when they are easiest to treat. [facility] follows the current, evidence-based guidelines published by the [facility] ([facility]) when recommending preventive services for our patients. Because we follow these guidelines, sometimes recommendations change over time as research supports it. (For example, a prostate screening blood test is no longer routinely recommended for men with no symptoms). Of course, you and your doctor may decide to screen more often for some diseases, based on your risk and co-morbidities (chronic disease you are already diagnosed with). Preventive services for you include: - Medicare offers their members a free annual wellness visit, which is time for you and your primary care provider to discuss and plan for your preventive service needs. Take advantage of this benefit every year! 
-All adults over age 72 should receive the recommended pneumonia vaccines. Current USPSTF guidelines recommend a series of two vaccines for the best pneumonia protection.  
-All adults should have a flu vaccine yearly and tetanus vaccine every 10 years. 
-All adults age 48 and older should receive the shingles vaccines (series of two vaccines).       
-All adults age 38-68 who are overweight should have a diabetes screening test once every three years.  
-Other screening tests & preventive services for persons with diabetes include: an eye exam to screen for diabetic retinopathy, a kidney function test, a foot exam, and stricter control over your cholesterol.  
-Cardiovascular screening for adults with routine risk involves an electrocardiogram (ECG) at intervals determined by the provider.  
-Colorectal cancer screening should be done for adults age 54-65 with no increased risk factors for colorectal cancer. There are a number of acceptable methods of screening for this type of cancer. Each test has its own benefits and drawbacks. Discuss with your provider what is most appropriate for you during your annual wellness visit. The different tests include: colonoscopy (considered the best screening method), a fecal occult blood test, a fecal DNA test, and sigmoidoscopy. 
-All adults born between [facility] should be screened once for Hepatitis C. 
-An Abdominal Aortic Aneurysm (AAA) Screening is recommended for men age 73-68 who has ever smoked in their lifetime. Here is a list of your current Health Maintenance items (your personalized list of preventive services) with a due date: 
Health Maintenance Due Topic Date Due  
 Hepatitis C Test  Never done  Cholesterol Test   02/18/2021  COVID-19 Vaccine (2 - Moderna 2-dose series) 03/29/2021

## 2021-04-16 DIAGNOSIS — K44.9 HIATAL HERNIA: ICD-10-CM

## 2021-04-17 RX ORDER — OMEPRAZOLE 20 MG/1
CAPSULE, DELAYED RELEASE ORAL
Qty: 90 CAP | Refills: 3 | Status: SHIPPED | OUTPATIENT
Start: 2021-04-17 | End: 2022-02-01

## 2021-07-22 ENCOUNTER — OFFICE VISIT (OUTPATIENT)
Dept: CARDIOLOGY CLINIC | Age: 76
End: 2021-07-22
Payer: MEDICARE

## 2021-07-22 DIAGNOSIS — Z95.0 CARDIAC PACEMAKER IN SITU: Primary | ICD-10-CM

## 2021-07-22 PROCEDURE — 93296 REM INTERROG EVL PM/IDS: CPT | Performed by: NURSE PRACTITIONER

## 2021-07-22 NOTE — LETTER
7/22/2021 9:47 AM    Mr. Indigo Medina  [address] [facility] [address]        Dear Mr. Indigo Medina,    We have received your recent remote monitor check of your implanted device scheduled on  7/22/2021. Your remaining estimated battery life is 9.4 years and your device is working normally & appropriately. Your next remote monitor check is scheduled for 10/21/2021. You do not need to report to the office as this occurs during the night from your home. Your next clinic/office check is scheduled for Wednesday, 12/15/2021 at 1:00 pm.  You will have your device checked then see the provider. Please bring a complete list of your medications with strengths and dosages to this appointment. Please plan to arrive 10 minutes early to allow time for check-in. [employer] parking has resumed again and is available Monday through Friday from 7:00 am to 5:00 pm.    If you have any questions, please call the Pacemaker/ICD clinic at the [address] [place] [address] location at [phone]. We appreciate you staying remotely connected. Sincerely,    Rose YOST, RN  Cardiac Device Clinic Coordinator  [facility].[address]  [address], [address] [facility]  [phone]

## 2021-10-21 ENCOUNTER — OFFICE VISIT (OUTPATIENT)
Dept: CARDIOLOGY CLINIC | Age: 76
End: 2021-10-21
Payer: MEDICARE

## 2021-10-21 DIAGNOSIS — Z95.0 CARDIAC PACEMAKER IN SITU: Primary | ICD-10-CM

## 2021-10-21 PROCEDURE — 93296 REM INTERROG EVL PM/IDS: CPT | Performed by: NURSE PRACTITIONER

## 2021-10-21 NOTE — LETTER
10/26/2021 11:40 AM    Mr. Goldie Lira  [address] [address]      Dear Mr. Goldie Lira,    We have received your recent remote monitor check of your implanted device on 10/21/2021. Your remaining estimated battery life is 8.6 years  and your device is working normally & appropriately. Your next remote monitor check is scheduled for 3/17/2022 . Please make a note on your calender. Your next clinic/office check is scheduled for Wednesday, 12/15/2021 at 1:00 pm.  You will have your device checked then see the provider. Please bring a complete list of your medications with strengths and dosages to this appointment. Also, be prepared to discuss any symptoms you may be having since your last visit. Please plan to arrive 10 minutes early to allow time for check-in. [employer] parking is CLOSED once again, due to [address]. The parking lot entrance that is next to [address] is also CLOSED. If you have difficulty walking from the parking lot, please arrange to have someone drop you off to allow time to check into the office. You are allowed to have one visitor accompany you to your appointment and no children under the age of 13 are allowed. Masks are mandatory while in the building. If you have any questions, please call the Pacemaker/ICD clinic at the [facility] location at [phone]. We appreciate you staying remotely connected! Sincerely,    Denver Betters BSN, RN  Cardiac Device Clinic Coordinator  [facility].[address]  [address], [address] [place] [address]  [phone]

## 2022-02-01 DIAGNOSIS — K44.9 HIATAL HERNIA: ICD-10-CM

## 2022-02-01 RX ORDER — OMEPRAZOLE 20 MG/1
CAPSULE, DELAYED RELEASE ORAL
Qty: 90 CAPSULE | Refills: 3 | Status: SHIPPED | OUTPATIENT
Start: 2022-02-01

## 2022-02-21 DIAGNOSIS — J30.9 ALLERGIC RHINITIS, UNSPECIFIED SEASONALITY, UNSPECIFIED TRIGGER: ICD-10-CM

## 2022-02-21 RX ORDER — IPRATROPIUM BROMIDE 21 UG/1
SPRAY, METERED NASAL
Qty: 30 ML | Refills: 3 | Status: SHIPPED | OUTPATIENT
Start: 2022-02-21

## 2022-02-24 ENCOUNTER — TELEPHONE (OUTPATIENT)
Dept: CARDIOLOGY CLINIC | Age: 77
End: 2022-02-24

## 2022-02-24 NOTE — TELEPHONE ENCOUNTER
Pt carmelo using two identifiers. He verified he will be followed for in-clinic & remote transmission by [facility]. Made inactive in [employer] & released in [employer].

## 2022-03-03 NOTE — PROGRESS NOTES
Selene Carl is a 64 y.o. male who presents today for Annual Wellness Visit (Labs )  . 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 Today patient is here for CPE. Having a bit more drainage and rhinitis. Does take an antihistamine as well as ipratropium    Afib: on [address] Also on BB. 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 They do keep an eye on his carotid artery disease. S/p TKA in June. Doing well. Monitoring his L knee. Hyperlipidemia  On statin. ROS: taking medications as instructed, no medication side effects noted  No new myalgias, no joint pains, no weakness  No TIA's, no chest pain on exertion, no dyspnea on exertion, no swelling of ankles.    Lab Results   Component Value Date/Time    Cholesterol, total 135 03/25/2021 09:45 AM    HDL Cholesterol 63 03/25/2021 09:45 AM    LDL, calculated 64.2 03/25/2021 09:45 AM    VLDL, calculated 7.8 03/25/2021 09:45 AM    Triglyceride 39 03/25/2021 09:45 AM    CHOL/HDL Ratio 2.1 03/25/2021 09:45 AM       Health maintenance hx includes:  Exercise: moderately active.  Form of exercise: pool aerobic.   Diet: generally follows a low fat low cholesterol diet, nonsmoker, alcohol intake none  Social:  Active, water aerobics and bowling.                Retired from [employer].                 Lives at home with wife,   Two daughters and 6 grandchildren.     Cancer screening:    Colon cancer screening:  Last Colonoscopy: 2019 and was normal   Prostate cancer screening: PSA and/or CARLOS:sees urology for BPH    Immunizations:     Immunization History   Administered Date(s) Administered    (RETIRED) Pneumococcal Vaccine (Unspecified Type) 10/24/2011    COVID-19, Moderna Booster, PF, 0.25mL Dose 12/14/2021    COVID-19, Moderna, Primary or Immunocompromised Series, MRNA, PF, 100mcg/0.5mL 03/01/2021, 03/29/2021    Influenza High Dose Vaccine PF 09/25/2015, 09/30/2016, 10/02/2018    Influenza Vaccine 09/22/2014, 09/21/2020, 09/29/2021    Influenza Vaccine (Tri) Adjuvanted (>65 Yrs FLUAD TRI [id number]) 10/15/2018, 09/06/2019    Influenza Vaccine (Whole Virus) 10/15/2012    Influenza Vaccine Split 10/24/2011    Influenza, High-dose, Quadrivalent (>65 Yrs Fluzone High Dose Quad [id number]) 09/11/2020    Novel Influenza-H1N1-09, All Formulations 01/07/2010    Pneumococcal Conjugate (PCV-13) 09/25/2015    Pneumococcal Vaccine (Unspecified Type) 10/15/2012    Tdap 01/11/2016    Zoster Recombinant 10/23/2019, 01/10/2020    Zoster Vaccine, Live 07/28/2012      Immunization status: up to date and documented. ROS  Review of Systems   Constitutional: Negative for chills, fever and weight loss. HENT: Negative for congestion and sore throat. Eyes: Negative for blurred vision, double vision and photophobia. Respiratory: Negative for cough and shortness of breath. Cardiovascular: Negative for chest pain, palpitations and leg swelling. Gastrointestinal: Negative for abdominal pain, constipation, diarrhea, heartburn, nausea and vomiting. Genitourinary: Negative for dysuria, frequency and urgency. Musculoskeletal: Negative for joint pain and myalgias. Skin: Negative for rash. Neurological: Negative. Negative for headaches. Endo/Heme/Allergies: Does not bruise/bleed easily. Psychiatric/Behavioral: Negative for memory loss and suicidal ideas. Visit Vitals  /76 (BP 1 Location: Left upper arm, BP Patient Position: Sitting, BP Cuff Size: Adult)   Pulse 64   Temp 97.4 °F (36.3 °C) (Oral)   Resp 16   Ht 6' 2\" (1.88 m)   Wt 220 lb 6.4 oz (100 kg)   SpO2 99%   BMI 28.30 kg/m²       Physical Exam  Constitutional:       Appearance: He is well-developed. He is not diaphoretic. HENT:      Head: Normocephalic and atraumatic. Right Ear: Tympanic membrane normal.      Left Ear: Tympanic membrane normal.   Eyes:      Pupils: Pupils are equal, round, and reactive to light. Neck:      Vascular: No JVD. Cardiovascular:      Rate and Rhythm: Normal rate and regular rhythm. Heart sounds: No murmur heard. Pulmonary:      Effort: Pulmonary effort is normal. No respiratory distress. Breath sounds: Normal breath sounds. No wheezing. Abdominal:      General: Bowel sounds are normal. There is no distension. Palpations: Abdomen is soft. Tenderness: There is no abdominal tenderness. Musculoskeletal:         General: Normal range of motion. Cervical back: Normal range of motion and neck supple. Skin:     General: Skin is warm and dry. Neurological:      Mental Status: He is alert and oriented to person, place, and time. Cranial Nerves: No cranial nerve deficit. Psychiatric:         Behavior: Behavior normal.           Current Outpatient Medications   Medication Sig    tadalafiL (CIALIS) 5 mg tablet Take 5 mg by mouth daily as needed for Erectile Dysfunction.     ipratropium (ATROVENT) 21 mcg (0.03 %) nasal spray USE 2 SPRAYS IN THE LEFT NOSTRIL EVERY 12 HOURS    omeprazole (PRILOSEC) 20 mg capsule TAKE 1 CAPSULE DAILY    dicyclomine (BENTYL) 10 mg capsule Take 1 Cap by mouth three (3) times daily as needed for Abdominal Cramps or Cramping.  Pradaxa 150 mg capsule TAKE 1 CAPSULE TWICE A DAY    atorvastatin (LIPITOR) 40 mg tablet Take 40 mg by mouth every evening.  loratadine (CLARITIN REDITABS) 10 mg dissolvable tablet Take 10 mg by mouth daily.  metoprolol succinate (Toprol XL) 50 mg XL tablet Take 75 mg by mouth every evening.  KRILL OIL PO Take 1 Cap by mouth daily.  VOLTAREN 1 % gel as needed.  B.infantis-B.ani-B.long-B.bifi (PROBIOTIC 4X) 10-15 mg TbEC Take 1 Cap by mouth daily.  melatonin 5 mg cap capsule Take 5 mg by mouth nightly as needed.  acetaminophen (TYLENOL EXTRA STRENGTH) 500 mg tablet Take 2 tablets by mouth three (3) times daily as needed for Pain.  silodosin (RAPAFLO) 8 mg capsule Take 8 mg by mouth daily (with breakfast).  GLUCOSAMINE HCL/CHONDRO DONALDSON A (GLUCOSAMINE-CHONDROITIN PO) Take  by mouth daily.  aspirin delayed-release 81 mg tablet Take 81 mg by mouth every evening.  cholecalciferol, vitamin d3, (VITAMIN D) 1,000 unit tablet Take 2,000 Units by mouth every evening. No current facility-administered medications for this visit.         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 Zelalem Asher [facility] [address]  06/2017    Right Eye laser treatment     HX VASECTOMY  25-30 yrs ago    IN CARDIAC SURG PROCEDURE UNLIST      pacemaker placement    STRESS TEST CARDIOLITE  4/2008    11 min., normal perfusion, EF 57%    VAS CAROTID DUPLEX BILATERAL  8/15/11    10-49% bilateral, unchanged from one year prior      Social History     Tobacco Use    Smoking status: Never Smoker    Smokeless tobacco: Never Used   Substance Use Topics    Alcohol use: No      Family History   Problem Relation Age of Onset    Stroke Mother     Cancer Father         prostate    Hypertension Father     Diabetes Brother     Cancer Brother         prostate    Diabetes Brother     Hypertension Brother     Cancer Brother         melanoma    Cancer Daughter         melanoma    Anesth Problems Neg Hx         Allergies   Allergen Reactions    Other Plant, Animal, Environmental Angioedema     Entire body edema with fire ants    Milk Containing Products Diarrhea    Venom-Honey Bee Rash     Yellowjackets      Amoxicillin Rash    Clindamycin Hives and Rash    Pcn [Penicillins] Rash     States he is able to tolerate cephalexin with no adverse reaction    Sulfa (Sulfonamide Antibiotics) Rash        Assessment/Plan  Diagnoses and all orders for this visit:    1. Medicare annual wellness visit, shy [place] was counseled on age-appropriate/ guideline-based risk prevention behaviors and screening for a 64y.o. year old   male . We also discussed adjustments in screening based on family history if necessary. Printed instructions for preventative screening guidelines and healthy behaviors given to patient with after visit summary.       2. Bilateral carotid artery disease, unspecified type ([address]- Follows with cardiology     3. James-tachy syndrome (HCC)-follows with cardiology and electrophysiology    4. Thrombocytopenia (HCC)-had improved  -     METABOLIC PANEL, COMPREHENSIVE; Future  -     CBC WITH AUTOMATED DIFF; Future  -     LIPID PANEL; Future    5. Advanced directives, counseling/discussion    6. Benign prostatic hyperplasia, unspecified whether lower urinary tract symptoms present-did have Cialis added. Unsure if this is helped    7. Rhinitis, unspecified type-Cialis may have worsened this. Patient to try to hold that and otherwise try Flonase. Continue ipratropium  -     fluticasone propionate (FLONASE) 50 mcg/actuation nasal spray; 2 Sprays by Both Nostrils route daily. 8. Dyslipidemia-repeat levels  -     METABOLIC PANEL, COMPREHENSIVE; Future  -     CBC WITH AUTOMATED DIFF; Future  -     LIPID PANEL; Future    9. Irritable bowel syndrome with diarrhea-currently manageable  -     dicyclomine (BENTYL) 10 mg capsule; Take 1 Capsule by mouth three (3) times daily as needed for Abdominal Cramps or Cramping. Meseret Amanda MD  3/4/2022    This note was created with the help of speech recognition software [employer] and may contain some 'sound alike' errors. This is the Subsequent Medicare Annual Wellness Exam, performed 12 months or more after the Initial AWV or the last Subsequent AWV    I have reviewed the patient's medical history in detail and updated the computerized patient record. Assessment/Plan   Education and counseling provided:  Are appropriate based on today's review and evaluation  End-of-Life planning (with patient's consent)  Prostate cancer screening tests (PSA, covered annually)  Colorectal cancer screening tests    1. Medicare annual wellness visit, subsequent  2. Bilateral carotid artery disease, unspecified type ([address]  3. James-tachy syndrome ([address]  4. Thrombocytopenia ([address]  5.  Advanced directives, counseling/discussion  6. Benign prostatic hyperplasia, unspecified whether lower urinary tract symptoms present       Depression Risk Factor Screening     3 most recent PHQ Screens 3/4/2022   PHQ Not Done -   Little interest or pleasure in doing things Not at all   Feeling down, depressed, irritable, or hopeless Not at all   Total Score PHQ 2 0   Trouble falling or staying asleep, or sleeping too much -   Feeling tired or having little energy -   Poor appetite, weight loss, or overeating -   Feeling bad about yourself - or that you are a failure or have let yourself or your family down -   Trouble concentrating on things such as school, work, reading, or watching TV -   Moving or speaking so slowly that other people could have noticed; or the opposite being so fidgety that others notice -   Thoughts of being better off dead, or hurting yourself in some way -   PHQ 9 Score -   How difficult have these problems made it for you to do your work, take care of your home and get along with others -       Alcohol & Drug Abuse Risk Screen    Do you average more than 1 drink per night or more than 7 drinks a week: No    In the past three months have you have had more than 4 drinks containing alcohol on one occasion: No          Functional Ability and Level of Safety    Hearing: Hearing is good. Activities of Daily Living: The home contains: no safety equipment. Patient does total self care      Ambulation: with no difficulty     Fall Risk:  Fall Risk Assessment, last 12 mths 3/4/2022   Able to walk? Yes   Fall in past 12 months? 0   Do you feel unsteady? 0   Are you worried about falling 0   Number of falls in past 12 months -   Fall with injury? -      Abuse Screen:  Patient is not abused       Cognitive Screening    Has your family/caregiver stated any concerns about your memory: no       Health Maintenance Due   There are no preventive care reminders to display for this patient.     Patient Care Team   Patient Care Team:  Nancy Sullivan MD as PCP - General (Internal Medicine)  Nancy Sullivan MD as PCP - [facility] Empaneled Provider  Royce Hernandez MD as Surgeon (Orthopedic Surgery)  Daria Shabazz MD (Urology)  Toby Lennon MD (Ophthalmology)  Enedina Valentino MD (Orthopedic Surgery)  Daria Shabazz MD (Urology)  Carissa Hernandez MD as Physician (Gastroenterology)  Sundar Luna MD as Physician (Cardiology)    History     Patient Active Problem List   Diagnosis Code    OA (osteoarthritis) of knee M17.10    Skin moles D22.9    Colon polyp K63.5    AR (allergic rhinitis) J30.9    Thyroid nodule E04.1    BPH (benign prostatic hyperplasia) N40.0    Atrial fibrillation ([address] I48.91    Scoliosis M41.9    Family history of prostate cancer Z80.42    Chronic maxillary sinusitis J32.0    James-tachy syndrome ([address] I49.5    Dyslipidemia E78.5    Elevated Lp(a) E78.41    Cerebral infarction ([address] I63.9    Bilateral carotid artery disease, unspecified type ([address] I77.9    Advanced care planning/counseling discussion Z71.89    Gastroesophageal reflux disease without esophagitis K21.9    Cardiac pacemaker in situ Z95.0    Excess skin of eyelid H02.30    Chronic fatigue R53.82    Other chest pain R07.89    Thrombocytopenia ([address] D69.6    Irritable bowel syndrome with diarrhea K58.0     Past Medical History:   Diagnosis Date    AR (allergic rhinitis) 2/11/2009    Atrial fibrillation ([address] 2003 to present    BPH (benign prostatic hypertrophy)     James-tachy syndrome ([address] 3/27/2012    CAD (coronary artery disease)     Colon polyp 2/11/2009    Dyslipidemia 6/5/2014    GERD (gastroesophageal reflux disease) fall 2013    OA (osteoarthritis) of knee 2/11/2009    Pacemaker     PSA elevation 07/2017    Skin moles 2/11/2009    Stroke ([address] 2015    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 Cayla Costa [facility]X [address]  06/2017    Right Eye laser treatment     HX VASECTOMY  25-30 yrs ago    AL CARDIAC SURG PROCEDURE UNLIST      pacemaker placement    STRESS TEST CARDIOLITE  4/2008    11 min., normal perfusion, EF 57%    VAS CAROTID DUPLEX BILATERAL  8/15/11    10-49% bilateral, unchanged from one year prior     Current Outpatient Medications   Medication Sig Dispense Refill    tadalafiL (CIALIS) 5 mg tablet Take 5 mg by mouth daily as needed for Erectile Dysfunction.  ipratropium (ATROVENT) 21 mcg (0.03 %) nasal spray USE 2 SPRAYS IN THE LEFT NOSTRIL EVERY 12 HOURS 30 mL 3    omeprazole (PRILOSEC) 20 mg capsule TAKE 1 CAPSULE DAILY 90 Capsule 3    dicyclomine (BENTYL) 10 mg capsule Take 1 Cap by mouth three (3) times daily as needed for Abdominal Cramps or Cramping. 90 Cap 2    Pradaxa 150 mg capsule TAKE 1 CAPSULE TWICE A  Cap 0    atorvastatin (LIPITOR) 40 mg tablet Take 40 mg by mouth every evening.  loratadine (CLARITIN REDITABS) 10 mg dissolvable tablet Take 10 mg by mouth daily.  metoprolol succinate (Toprol XL) 50 mg XL tablet Take 75 mg by mouth every evening.  KRILL OIL PO Take 1 Cap by mouth daily.  VOLTAREN 1 % gel as needed.  B.infantis-B.ani-B.long-B.bifi (PROBIOTIC 4X) 10-15 mg TbEC Take 1 Cap by mouth daily.  melatonin 5 mg cap capsule Take 5 mg by mouth nightly as needed.  acetaminophen (TYLENOL EXTRA STRENGTH) 500 mg tablet Take 2 tablets by mouth three (3) times daily as needed for Pain.       silodosin (RAPAFLO) 8 mg capsule Take 8 mg by mouth daily (with breakfast).  GLUCOSAMINE HCL/CHONDRO DONALDSON A (GLUCOSAMINE-CHONDROITIN PO) Take  by mouth daily.  aspirin delayed-release 81 mg tablet Take 81 mg by mouth every evening.  cholecalciferol, vitamin d3, (VITAMIN D) 1,000 unit tablet Take 2,000 Units by mouth every evening.        Allergies   Allergen Reactions    Other Plant, Animal, Environmental Angioedema     Entire body edema with fire ants    Milk Containing Products Diarrhea    Venom-Honey Bee Rash     Yellowjackets      Amoxicillin Rash    Clindamycin Hives and Rash    Pcn [Penicillins] Rash     States he is able to tolerate cephalexin with no adverse reaction    Sulfa (Sulfonamide Antibiotics) Rash       Family History   Problem Relation Age of Onset    Stroke Mother     Cancer Father         prostate    Hypertension Father     Diabetes Brother     Cancer Brother         prostate    Diabetes Brother     Hypertension Brother     Cancer Brother         melanoma    Cancer Daughter         melanoma    Anesth Problems Neg Hx      Social History     Tobacco Use    Smoking status: Never Smoker    Smokeless tobacco: Never Used   Substance Use Topics    Alcohol use: No         Thuhenry Turpin MD

## 2022-03-04 ENCOUNTER — OFFICE VISIT (OUTPATIENT)
Dept: INTERNAL MEDICINE CLINIC | Age: 77
End: 2022-03-04
Payer: MEDICARE

## 2022-03-04 VITALS
RESPIRATION RATE: 16 BRPM | WEIGHT: 220.4 LBS | OXYGEN SATURATION: 99 % | TEMPERATURE: 97.4 F | SYSTOLIC BLOOD PRESSURE: 124 MMHG | DIASTOLIC BLOOD PRESSURE: 76 MMHG | HEIGHT: 74 IN | BODY MASS INDEX: 28.28 KG/M2 | HEART RATE: 64 BPM

## 2022-03-04 DIAGNOSIS — I49.5 BRADY-TACHY SYNDROME (HCC): ICD-10-CM

## 2022-03-04 DIAGNOSIS — J31.0 RHINITIS, UNSPECIFIED TYPE: ICD-10-CM

## 2022-03-04 DIAGNOSIS — D69.6 THROMBOCYTOPENIA (HCC): ICD-10-CM

## 2022-03-04 DIAGNOSIS — E78.5 DYSLIPIDEMIA: ICD-10-CM

## 2022-03-04 DIAGNOSIS — N40.0 BENIGN PROSTATIC HYPERPLASIA, UNSPECIFIED WHETHER LOWER URINARY TRACT SYMPTOMS PRESENT: ICD-10-CM

## 2022-03-04 DIAGNOSIS — Z00.00 MEDICARE ANNUAL WELLNESS VISIT, SUBSEQUENT: Primary | ICD-10-CM

## 2022-03-04 DIAGNOSIS — Z71.89 ADVANCED DIRECTIVES, COUNSELING/DISCUSSION: ICD-10-CM

## 2022-03-04 DIAGNOSIS — I77.9 BILATERAL CAROTID ARTERY DISEASE, UNSPECIFIED TYPE (HCC): ICD-10-CM

## 2022-03-04 DIAGNOSIS — K58.0 IRRITABLE BOWEL SYNDROME WITH DIARRHEA: ICD-10-CM

## 2022-03-04 LAB
ALBUMIN SERPL-MCNC: 4 G/DL (ref 3.5–5)
ALBUMIN/GLOB SERPL: 1.3 {RATIO} (ref 1.1–2.2)
ALP SERPL-CCNC: 121 U/L (ref 45–117)
ALT SERPL-CCNC: 22 U/L (ref 12–78)
ANION GAP SERPL CALC-SCNC: 2 MMOL/L (ref 5–15)
AST SERPL-CCNC: 25 U/L (ref 15–37)
BASOPHILS # BLD: 0.1 K/UL (ref 0–0.1)
BASOPHILS NFR BLD: 1 % (ref 0–1)
BILIRUB SERPL-MCNC: 1.9 MG/DL (ref 0.2–1)
BUN SERPL-MCNC: 18 MG/DL (ref 6–20)
BUN/CREAT SERPL: 16 (ref 12–20)
CALCIUM SERPL-MCNC: 9.2 MG/DL (ref 8.5–10.1)
CHLORIDE SERPL-SCNC: 107 MMOL/L (ref 97–108)
CHOLEST SERPL-MCNC: 147 MG/DL
CO2 SERPL-SCNC: 31 MMOL/L (ref 21–32)
CREAT SERPL-MCNC: 1.15 MG/DL (ref 0.7–1.3)
DIFFERENTIAL METHOD BLD: ABNORMAL
EOSINOPHIL # BLD: 0 K/UL (ref 0–0.4)
EOSINOPHIL NFR BLD: 1 % (ref 0–7)
ERYTHROCYTE [DISTWIDTH] IN BLOOD BY AUTOMATED COUNT: 14.8 % (ref 11.5–14.5)
GLOBULIN SER CALC-MCNC: 3.2 G/DL (ref 2–4)
GLUCOSE SERPL-MCNC: 91 MG/DL (ref 65–100)
HCT VFR BLD AUTO: 53.9 % (ref 36.6–50.3)
HDLC SERPL-MCNC: 56 MG/DL
HDLC SERPL: 2.6 {RATIO} (ref 0–5)
HGB BLD-MCNC: 16.7 G/DL (ref 12.1–17)
IMM GRANULOCYTES # BLD AUTO: 0 K/UL (ref 0–0.04)
IMM GRANULOCYTES NFR BLD AUTO: 1 % (ref 0–0.5)
LDLC SERPL CALC-MCNC: 73.6 MG/DL (ref 0–100)
LYMPHOCYTES # BLD: 1.4 K/UL (ref 0.8–3.5)
LYMPHOCYTES NFR BLD: 28 % (ref 12–49)
MCH RBC QN AUTO: 27.9 PG (ref 26–34)
MCHC RBC AUTO-ENTMCNC: 31 G/DL (ref 30–36.5)
MCV RBC AUTO: 90 FL (ref 80–99)
MONOCYTES # BLD: 0.5 K/UL (ref 0–1)
MONOCYTES NFR BLD: 11 % (ref 5–13)
NEUTS SEG # BLD: 2.9 K/UL (ref 1.8–8)
NEUTS SEG NFR BLD: 58 % (ref 32–75)
NRBC # BLD: 0 K/UL (ref 0–0.01)
NRBC BLD-RTO: 0 PER 100 WBC
PLATELET # BLD AUTO: 174 K/UL (ref 150–400)
PMV BLD AUTO: 10 FL (ref 8.9–12.9)
POTASSIUM SERPL-SCNC: 4.5 MMOL/L (ref 3.5–5.1)
PROT SERPL-MCNC: 7.2 G/DL (ref 6.4–8.2)
RBC # BLD AUTO: 5.99 M/UL (ref 4.1–5.7)
SODIUM SERPL-SCNC: 140 MMOL/L (ref 136–145)
TRIGL SERPL-MCNC: 87 MG/DL (ref ?–150)
VLDLC SERPL CALC-MCNC: 17.4 MG/DL
WBC # BLD AUTO: 4.9 K/UL (ref 4.1–11.1)

## 2022-03-04 PROCEDURE — G8536 NO DOC ELDER MAL SCRN: HCPCS | Performed by: INTERNAL MEDICINE

## 2022-03-04 PROCEDURE — G8510 SCR DEP NEG, NO PLAN REQD: HCPCS | Performed by: INTERNAL MEDICINE

## 2022-03-04 PROCEDURE — G8427 DOCREV CUR MEDS BY ELIG CLIN: HCPCS | Performed by: INTERNAL MEDICINE

## 2022-03-04 PROCEDURE — G0439 PPPS, SUBSEQ VISIT: HCPCS | Performed by: INTERNAL MEDICINE

## 2022-03-04 PROCEDURE — 1101F PT FALLS ASSESS-DOCD LE1/YR: CPT | Performed by: INTERNAL MEDICINE

## 2022-03-04 PROCEDURE — G8419 CALC BMI OUT NRM PARAM NOF/U: HCPCS | Performed by: INTERNAL MEDICINE

## 2022-03-04 RX ORDER — FLUTICASONE PROPIONATE 50 MCG
2 SPRAY, SUSPENSION (ML) NASAL DAILY
Qty: 1 EACH | Refills: 1 | Status: SHIPPED | OUTPATIENT
Start: 2022-03-04 | End: 2022-03-08 | Stop reason: SDUPTHER

## 2022-03-04 RX ORDER — DICYCLOMINE HYDROCHLORIDE 10 MG/1
10 CAPSULE ORAL
Qty: 90 CAPSULE | Refills: 2 | Status: SHIPPED | OUTPATIENT
Start: 2022-03-04

## 2022-03-04 RX ORDER — TADALAFIL 5 MG/1
5 TABLET ORAL
COMMUNITY

## 2022-03-04 NOTE — PATIENT INSTRUCTIONS
Medicare Wellness Visit, Male    The best way to live healthy is to have a lifestyle where you eat a well-balanced diet, exercise regularly, limit alcohol use, and quit all forms of tobacco/nicotine, if applicable. Regular preventive services are another way to keep healthy. Preventive services (vaccines, screening tests, monitoring & exams) can help personalize your care plan, which helps you manage your own care. Screening tests can find health problems at the earliest stages, when they are easiest to treat. [facility] follows the current, evidence-based guidelines published by the [facility] Leah ([facility]) when recommending preventive services for our patients. Because we follow these guidelines, sometimes recommendations change over time as research supports it. (For example, a prostate screening blood test is no longer routinely recommended for men with no symptoms). Of course, you and your doctor may decide to screen more often for some diseases, based on your risk and co-morbidities (chronic disease you are already diagnosed with). Preventive services for you include:  - Medicare offers their members a free annual wellness visit, which is time for you and your primary care provider to discuss and plan for your preventive service needs. Take advantage of this benefit every year!  -All adults over age 72 should receive the recommended pneumonia vaccines. Current USPSTF guidelines recommend a series of two vaccines for the best pneumonia protection.   -All adults should have a flu vaccine yearly and tetanus vaccine every 10 years.  -All adults age 48 and older should receive the shingles vaccines (series of two vaccines).        -All adults age 38-68 who are overweight should have a diabetes screening test once every three years.   -Other screening tests & preventive services for persons with diabetes include: an eye exam to screen for diabetic retinopathy, a kidney function test, a foot exam, and stricter control over your cholesterol.   -Cardiovascular screening for adults with routine risk involves an electrocardiogram (ECG) at intervals determined by the provider.   -Colorectal cancer screening should be done for adults age 54-65 with no increased risk factors for colorectal cancer. There are a number of acceptable methods of screening for this type of cancer. Each test has its own benefits and drawbacks. Discuss with your provider what is most appropriate for you during your annual wellness visit. The different tests include: colonoscopy (considered the best screening method), a fecal occult blood test, a fecal DNA test, and sigmoidoscopy.  -All adults born between [facility] should be screened once for Hepatitis C.  -An Abdominal Aortic Aneurysm (AAA) Screening is recommended for men age 73-68 who has ever smoked in their lifetime. Here is a list of your current Health Maintenance items (your personalized list of preventive services) with a due date: There are no preventive care reminders to display for this patient.

## 2022-03-04 NOTE — ACP (ADVANCE CARE PLANNING)
Advance Care Planning     General Advance Care Planning (ACP) Conversation      Date of Conversation: 3/4/2022  Conducted with: Patient with Decision Making Capacity    Healthcare Decision Maker:     Primary Decision Maker: Nilda Tony - Spouse - [phone]  Click here to complete [address] including selection of the Healthcare Decision Maker Relationship (ie \"Primary\")      Today we documented Decision Maker(s). The patient will provide ACP documents. Content/Action Overview:    Has ACP document(s) NOT on file - requested patient to provide    Length of Voluntary ACP Conversation in minutes:  <16 minutes (Non-Billable)    Indira Osullivan MD

## 2022-03-08 DIAGNOSIS — J31.0 RHINITIS, UNSPECIFIED TYPE: ICD-10-CM

## 2022-03-08 RX ORDER — FLUTICASONE PROPIONATE 50 MCG
2 SPRAY, SUSPENSION (ML) NASAL DAILY
Qty: 3 EACH | Refills: 3 | Status: SHIPPED | OUTPATIENT
Start: 2022-03-08

## 2022-03-18 PROBLEM — K58.0 IRRITABLE BOWEL SYNDROME WITH DIARRHEA: Status: ACTIVE | Noted: 2020-02-18

## 2022-03-18 PROBLEM — D69.6 THROMBOCYTOPENIA (HCC): Status: ACTIVE | Noted: 2020-02-18

## 2022-03-19 PROBLEM — R53.82 CHRONIC FATIGUE: Status: ACTIVE | Noted: 2020-01-18

## 2022-03-19 PROBLEM — R07.89 OTHER CHEST PAIN: Status: ACTIVE | Noted: 2020-01-18

## 2022-08-14 NOTE — PATIENT INSTRUCTIONS
[employer] is a chat platform to communicate between your doctor (myself) and you about any medical issues. It is free to join. Please use this for non urgent medical concerns.  If you need me urgently, please call the office at [phone] or call [phone] if it is an emergency    Go to your mobile phone, your activation code specific to me is - [email or web address] None

## 2022-08-26 NOTE — TELEPHONE ENCOUNTER
His doing very well from cardiac standpoint.  His exercise EKG showed some abnormality but myocardial perfusion study did not show any scintigraphic uptake suggest ischemia.  Therefore continue on optimize medical therapy and he remains symptom-free at this time   Continue on isosorbide mononitrate 30 mg daily, continue on risk factor modification with Crestor at 20 mg at bedtime and also on Co Q10   PTIDX2 instructed to hydrate and to see PCP if dizziness continues. Also notified needs a pacemaker annual check. Patient stated after taking a nap feels better. Verbalized understanding and had no further questions    Galina can you make appt for patient?

## 2022-10-25 NOTE — TELEPHONE ENCOUNTER
----- Message from Kalpesh Magana NP sent at 5/21/2018 10:23 AM EDT -----  Regarding: FW: Test Results Question  Contact: [phone]  Please call and inform Mr. Oneyda Krause that the lab work that he had drawn prior to May cancelled appt will still be fine for review and discussion at his rescheduled date and time. Thank you.     ----- Message -----     From: Radha Murillo RN     Sent: 5/21/2018   9:48 AM       To: Kalpesh Magana NP  Subject: FW: Test Results Question                            ----- Message -----     From: Roge Mcdonnell     Sent: 5/14/2018   4:59 PM       To: [facility] Nurse Pool  Subject: Test Results Question                            I had an appointment with Dr Jannie Correa on May 1 for which I was to get a blood test within 2 weeks of the appointment -  which I did. Your office cancelled that appointment and switched it to Jul 3. Should I have another blood test for the new appointment?   Albino Seen 25-Oct-2022 07:36

## 2022-12-14 RX ORDER — ALBUTEROL SULFATE 90 UG/1
1 AEROSOL, METERED RESPIRATORY (INHALATION)
Qty: 18 G | Refills: 2 | Status: SHIPPED | OUTPATIENT
Start: 2022-12-14

## 2022-12-15 DIAGNOSIS — K58.0 IRRITABLE BOWEL SYNDROME WITH DIARRHEA: ICD-10-CM

## 2022-12-15 RX ORDER — DICYCLOMINE HYDROCHLORIDE 10 MG/1
CAPSULE ORAL
Qty: 90 CAPSULE | Refills: 11 | Status: SHIPPED | OUTPATIENT
Start: 2022-12-15

## 2022-12-28 RX ORDER — BENZONATATE 100 MG/1
100 CAPSULE ORAL
Qty: 20 CAPSULE | Refills: 0 | Status: SHIPPED | OUTPATIENT
Start: 2022-12-28

## 2022-12-28 RX ORDER — BENZONATATE 100 MG/1
100 CAPSULE ORAL
COMMUNITY
End: 2022-12-28 | Stop reason: SDUPTHER

## 2023-01-21 LAB
ALBUMIN, 001488: 4
APOLIPOPROTEIN B: 69
AST,AST: 25 U/L (ref 10–40)
BILIRUB SERPL-MCNC: 1.9 MG/DL
BUN SERPL-MCNC: 18 MG/DL
BUN/CREATININE RATIO,BUCR: 16
CALCIUM, 001017: 9.2
CHLORIDE: 107 MMOL/L
CREAT SERPL-MCNC: 1.15 MG/DL
EGFR: 71
GLOBULIN, EXTERNAL: 3.2
GLUCOSE SERPL-MCNC: 91 MG/DL
HDL P: 28.3
LDL SIZE, 884309: 20.6
LIPOPROTEIN (A), 120189: 60
Lab: 122
Lab: 31
POTASSIUM, EXTERNAL: 4.5
PROTEIN, TOTAL: 7.2
SODIUM, EXTERNAL: 140

## 2023-01-31 ENCOUNTER — TELEPHONE (OUTPATIENT)
Dept: INTERNAL MEDICINE CLINIC | Age: 78
End: 2023-01-31

## 2023-01-31 RX ORDER — BENZONATATE 200 MG/1
200 CAPSULE ORAL
Qty: 21 CAPSULE | Refills: 0 | Status: SHIPPED | OUTPATIENT
Start: 2023-01-31 | End: 2023-02-07

## 2023-01-31 NOTE — TELEPHONE ENCOUNTER
----- Message from Carvin Najjar sent at 1/31/2023  1:15 PM EST -----  Subject: Appointment Request    Reason for Call: Established Patient Appointment needed: Routine Existing   Condition Follow Up    QUESTIONS    Reason for appointment request? No appointments available during search     Additional Information for Provider? Pt had Covid the last 2 weeks of   December. He still has a cough that he can't get rid of. Would like to See    about it.  Please call pt to advise when he could get in.  ---------------------------------------------------------------------------  --------------  [address]  [phone]; OK to leave message on voicemail  ---------------------------------------------------------------------------  --------------  SCRIPT ANSWERS  COVID Screen: [facility] [place]

## 2023-01-31 NOTE — TELEPHONE ENCOUNTER
01/31/2023--This user called and spoke with the patient and let him know our recommendation's and that we sent the RX into [facility] for him. Patient verbalized understanding and had no further question's or concerns at that time.

## 2023-01-31 NOTE — TELEPHONE ENCOUNTER
01/31/2023--This user called and spoke with the patient, he stated that he has had a cough ever since he had Covid and has been taking Mucinex DM and Robitussin as needed. Patient stated that he was prescribed an inhaler but stopped using it because he did not feel that it was helping and was also not SOB. Patient denies any SOB at this time. 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 He is now on Plavix, Lisinopril and Eliquis (Just an FYI). Patient denies any fevers. Patient stated that he was given a prescription previously for Tessalon Perrles for 5 days and it did help. Patient was wanting to know if some more of those could be called in. I let him know that I would check with the doctor and give him a call back. Patient verbalized understanding and had no further question's or concerns at that time.

## 2023-03-10 ENCOUNTER — OFFICE VISIT (OUTPATIENT)
Dept: INTERNAL MEDICINE CLINIC | Age: 78
End: 2023-03-10

## 2023-03-10 VITALS
HEIGHT: 74 IN | TEMPERATURE: 97.4 F | HEART RATE: 67 BPM | SYSTOLIC BLOOD PRESSURE: 132 MMHG | RESPIRATION RATE: 16 BRPM | DIASTOLIC BLOOD PRESSURE: 78 MMHG | BODY MASS INDEX: 27.62 KG/M2 | WEIGHT: 215.2 LBS | OXYGEN SATURATION: 97 %

## 2023-03-10 DIAGNOSIS — J30.9 ALLERGIC RHINITIS, UNSPECIFIED SEASONALITY, UNSPECIFIED TRIGGER: ICD-10-CM

## 2023-03-10 DIAGNOSIS — N40.0 BENIGN PROSTATIC HYPERPLASIA, UNSPECIFIED WHETHER LOWER URINARY TRACT SYMPTOMS PRESENT: ICD-10-CM

## 2023-03-10 DIAGNOSIS — K44.9 HIATAL HERNIA: ICD-10-CM

## 2023-03-10 DIAGNOSIS — I49.5 BRADY-TACHY SYNDROME (HCC): ICD-10-CM

## 2023-03-10 DIAGNOSIS — Z71.89 ADVANCED DIRECTIVES, COUNSELING/DISCUSSION: ICD-10-CM

## 2023-03-10 DIAGNOSIS — E78.5 DYSLIPIDEMIA: ICD-10-CM

## 2023-03-10 DIAGNOSIS — I77.9 BILATERAL CAROTID ARTERY DISEASE, UNSPECIFIED TYPE (HCC): ICD-10-CM

## 2023-03-10 DIAGNOSIS — Z00.00 MEDICARE ANNUAL WELLNESS VISIT, SUBSEQUENT: Primary | ICD-10-CM

## 2023-03-10 DIAGNOSIS — M17.0 OSTEOARTHRITIS OF BOTH KNEES, UNSPECIFIED OSTEOARTHRITIS TYPE: ICD-10-CM

## 2023-03-10 DIAGNOSIS — D69.6 THROMBOCYTOPENIA (HCC): ICD-10-CM

## 2023-03-10 DIAGNOSIS — H34.9 RETINAL ARTERY OCCLUSION: ICD-10-CM

## 2023-03-10 RX ORDER — OMEPRAZOLE 20 MG/1
20 CAPSULE, DELAYED RELEASE ORAL DAILY
Qty: 90 CAPSULE | Refills: 3 | Status: SHIPPED | OUTPATIENT
Start: 2023-03-10

## 2023-03-10 RX ORDER — IPRATROPIUM BROMIDE 21 UG/1
2 SPRAY, METERED NASAL EVERY 12 HOURS
Qty: 30 ML | Refills: 3 | Status: SHIPPED | OUTPATIENT
Start: 2023-03-10

## 2023-03-10 RX ORDER — LISINOPRIL 2.5 MG/1
2.5 TABLET ORAL DAILY
COMMUNITY

## 2023-03-10 RX ORDER — CLOPIDOGREL BISULFATE 75 MG/1
75 TABLET ORAL DAILY
COMMUNITY

## 2023-03-10 NOTE — PATIENT INSTRUCTIONS
Medicare Wellness Visit, Male    The best way to live healthy is to have a lifestyle where you eat a well-balanced diet, exercise regularly, limit alcohol use, and quit all forms of tobacco/nicotine, if applicable. Regular preventive services are another way to keep healthy. Preventive services (vaccines, screening tests, monitoring & exams) can help personalize your care plan, which helps you manage your own care. Screening tests can find health problems at the earliest stages, when they are easiest to treat. [facility] follows the current, evidence-based guidelines published by the [facility] ([facility]) when recommending preventive services for our patients. Because we follow these guidelines, sometimes recommendations change over time as research supports it. (For example, a prostate screening blood test is no longer routinely recommended for men with no symptoms). Of course, you and your doctor may decide to screen more often for some diseases, based on your risk and co-morbidities (chronic disease you are already diagnosed with). Preventive services for you include:  - Medicare offers their members a free annual wellness visit, which is time for you and your primary care provider to discuss and plan for your preventive service needs.  Take advantage of this benefit every year!    -Over the age of 72 should receive the recommended pneumonia vaccines.   -All adults should have a flu vaccine yearly.  -All adults should receive a tetanus vaccine every 10 years.  -Over the age of 48 should receive the shingles vaccines.    -All adults should be screened once for Hepatitis C.  -All adults age 38-68 who are overweight should have a diabetes screening test once every three years.   -Other screening tests & preventive services for persons with diabetes include: an eye exam to screen for diabetic retinopathy, a kidney function test, a foot exam, and stricter control over your cholesterol.   -Cardiovascular screening for adults with routine risk involves an electrocardiogram (ECG) at intervals determined by the provider.     -Colorectal cancer screening should be done for adults age 43-69 with no increased risk factors for colorectal cancer. There are a number of acceptable methods of screening for this type of cancer. Each test has its own benefits and drawbacks. Discuss with your provider what is most appropriate for you during your annual wellness visit. The different tests include: colonoscopy (considered the best screening method), a fecal occult blood test, a fecal DNA test, and sigmoidoscopy.    -Lung cancer screening is recommended annually with a low dose CT scan for adults between age 54 and 68, who have smoked at least 30 pack years (equivalent of 1 pack per day for 30 days), and who is a current smoker or quit less than 15 years ago. -An Abdominal Aortic Aneurysm (AAA) Screening is recommended for men age 73-68 who has ever smoked in their lifetime.      Here is a list of your current Health Maintenance items (your personalized list of preventive services) with a due date:  Health Maintenance Due   Topic Date Due    COVID-19 Vaccine (4 - Booster for Moderna series) 02/08/2022    Cholesterol Test   03/04/2023

## 2023-03-10 NOTE — PROGRESS NOTES
César Blackwell is a 76 y.o. male who presents today for Annual Wellness Visit and Labs  . 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 Today patient is here for follow up. Had Emilia in December. Feels as though he has recovered overall. Afib: on [address] with eliquis. 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 Also had plavix added. 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 Also on BB. 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 They do keep an eye on his carotid artery disease. Was WNL in the fall. Had Lisinopril added. Of note this abnormality can just before he tested positive for COVID. Considering switching back to cardiology in our system so that his care can be under 1 medical chart. Joints are doing well. Did see Ortho due to some chronic wrist issues last year. Hypertension- better with the addition of low-dose lisinopril. Remains on metoprolol  Hypertension ROS: taking medications as instructed, no medication side effects noted, no TIA's, no chest pain on exertion, no dyspnea on exertion, no swelling of ankles     reports that he has never smoked.  He has never used smokeless tobacco.    reports no history of alcohol use. BP Readings from Last 2 Encounters:   03/10/23 132/78   03/04/22 124/76     Hyperlipidemia- Lipids ok in Jan. CMP WNL. He brings in these labs with him. On Lipitor. ROS: taking medications as instructed, no medication side effects noted  No new myalgias, no joint pains, no weakness  No TIA's, no chest pain on exertion, no dyspnea on exertion, no swelling of ankles. Lab Results   Component Value Date/Time    Cholesterol, total 147 03/04/2022 09:18 AM    HDL Cholesterol 56 03/04/2022 09:18 AM    LDL, calculated 73.6 03/04/2022 09:18 AM    VLDL, calculated 17.4 03/04/2022 09:18 AM    Triglyceride 87 03/04/2022 09:18 AM    CHOL/HDL Ratio 2.6 03/04/2022 09:18 AM       Health maintenance hx includes:  Exercise: moderately active. Form of exercise: gym and water aerobics. Diet: generally follows a low fat low cholesterol diet, nonsmoker, alcohol intake none  Social:  Active, water aerobics and bowling. Retired from [employer] in [place] [address]. .                  Lives at home with wife,   Two daughters and 6 grandchildren. Cancer screening:    Colon cancer screening:  Last Colonoscopy:  2019  and was normal   Prostate cancer screening: follows with Urology.      Immunizations:     Immunization History   Administered Date(s) Administered     Influenza, FLUZONE (age 72 y+), High Dose 09/11/2020    (RETIRED) Pneumococcal Vaccine (Unspecified Type) 10/24/2011    COVID-19, MODERNA BLUE border, Primary or Immunocompromised, (age 18y+), IM, 100 mcg/0.5mL 03/01/2021, 03/29/2021    COVID-19, MODERNA Booster BLUE border, (age 18y+), IM, 50mcg/0.25mL 12/14/2021    Influenza High Dose Vaccine PF 09/25/2015, 09/30/2016, 10/02/2018    Influenza Vaccine 09/22/2014, 09/21/2020, 09/29/2021    Influenza Vaccine (Tri) Adjuvanted (>65 Yrs FLUAD TRI 54928) 10/15/2018, 09/06/2019    Influenza Vaccine (Whole Virus) 10/15/2012    Influenza Vaccine Split 10/24/2011    Novel Influenza-H1N1-09, All Formulations 01/07/2010    Pneumococcal Conjugate (PCV-13) 09/25/2015    Pneumococcal Vaccine (Unspecified Type) 10/15/2012    Tdap 01/11/2016    Zoster Recombinant 10/23/2019, 01/10/2020    Zoster Vaccine, Live 07/28/2012      Immunization status: up to date and documented. ROS  Review of Systems   Constitutional:  Negative for chills, fever and weight loss. HENT:  Negative for congestion, ear discharge, ear pain, hearing loss, sore throat and tinnitus. Eyes:  Positive for blurred vision. Negative for double vision and photophobia. Respiratory:  Negative for cough and shortness of breath. Cardiovascular:  Negative for chest pain, palpitations and leg swelling. Cold feet, but no claudication   Gastrointestinal:  Negative for abdominal pain, constipation, diarrhea, heartburn, nausea and vomiting. Genitourinary:  Negative for dysuria, frequency and urgency. Musculoskeletal:  Positive for joint pain. Negative for myalgias. Skin:  Negative for rash. Neurological: Negative. Negative for headaches. Endo/Heme/Allergies:  Does not bruise/bleed easily. Psychiatric/Behavioral:  Negative for memory loss and suicidal ideas. Visit Vitals  /78 (BP 1 Location: Left upper arm, BP Patient Position: Sitting, BP Cuff Size: Adult)   Pulse 67   Temp 97.4 °F (36.3 °C) (Oral)   Resp 16   Ht 6' 2\" (1.88 m)   Wt 215 lb 3.2 oz (97.6 kg)   SpO2 97%   BMI 27.63 kg/m²       Physical Exam  Constitutional:       Appearance: He is well-developed. He is not diaphoretic. HENT:      Head: Normocephalic and atraumatic. Right Ear: Tympanic membrane normal.      Left Ear: Tympanic membrane normal.   Eyes:      Pupils: Pupils are equal, round, and reactive to light. Neck:      Vascular: No JVD. Cardiovascular:      Rate and Rhythm: Normal rate and regular rhythm. Heart sounds: No murmur heard.      Comments: Cardiac device to chest   Pulmonary: Effort: Pulmonary effort is normal. No respiratory distress. Breath sounds: Normal breath sounds. No wheezing. Abdominal:      General: Bowel sounds are normal. There is no distension. Palpations: Abdomen is soft. Tenderness: There is no abdominal tenderness. Musculoskeletal:         General: Normal range of motion. Cervical back: Normal range of motion and neck supple. Skin:     General: Skin is warm and dry. Neurological:      Mental Status: He is alert and oriented to person, place, and time. Cranial Nerves: No cranial nerve deficit. Psychiatric:         Behavior: Behavior normal.         Current Outpatient Medications   Medication Sig    apixaban (Eliquis) 5 mg tablet Take 5 mg by mouth two (2) times a day. clopidogreL (Plavix) 75 mg tab Take 75 mg by mouth daily. lisinopriL (PRINIVIL, ZESTRIL) 2.5 mg tablet Take 2.5 mg by mouth daily. dicyclomine (BENTYL) 10 mg capsule TAKE 1 CAPSULE THREE TIMES A DAY AS NEEDED FOR ABDOMINAL CRAMPS OR CRAMPING    tadalafiL (CIALIS) 5 mg tablet Take 5 mg by mouth daily as needed for Erectile Dysfunction. ipratropium (ATROVENT) 21 mcg (0.03 %) nasal spray USE 2 SPRAYS IN THE LEFT NOSTRIL EVERY 12 HOURS    omeprazole (PRILOSEC) 20 mg capsule TAKE 1 CAPSULE DAILY    atorvastatin (LIPITOR) 40 mg tablet Take 40 mg by mouth every evening.    loratadine (CLARITIN REDITABS) 10 mg dissolvable tablet Take 10 mg by mouth daily. metoprolol succinate (TOPROL-XL) 50 mg XL tablet Take 75 mg by mouth every evening. KRILL OIL PO Take 1 Cap by mouth daily. VOLTAREN 1 % gel as needed. B.animalis,bifid,infantis,long 10-15 mg TbEC Take 1 Cap by mouth daily. melatonin 5 mg cap capsule Take 5 mg by mouth nightly as needed. acetaminophen (TYLENOL) 500 mg tablet Take 2 tablets by mouth three (3) times daily as needed for Pain.    silodosin (RAPAFLO) 8 mg capsule Take 8 mg by mouth daily (with breakfast).     GLUCOSAMINE HCL/CHONDRO DONALDSON A (GLUCOSAMINE-CHONDROITIN PO) Take  by mouth daily. cholecalciferol (VITAMIN D3) (1000 Units /25 mcg) tablet Take 2,000 Units by mouth every evening. Pradaxa 150 mg capsule TAKE 1 CAPSULE TWICE A DAY     No current facility-administered medications for this visit.         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 Laith Mckeon 108  06/2017    Right Eye laser treatment     HX VASECTOMY  25-30 yrs ago    [address]      pacemaker placement    STRESS TEST CARDIOLITE  4/2008    11 min., normal perfusion, EF 57%    VAS CAROTID DUPLEX BILATERAL  8/15/11    10-49% bilateral, unchanged from one year prior      Social History     Tobacco Use    Smoking status: Never    Smokeless tobacco: Never   Substance Use Topics    Alcohol use: No      Family History   Problem Relation Age of Onset    Stroke Mother     Cancer Father         prostate    Hypertension Father     Diabetes Brother Cancer Brother         prostate    Diabetes Brother     Hypertension Brother     Cancer Brother         melanoma    Cancer Daughter         melanoma    Anesth Problems Neg Hx         Allergies   Allergen Reactions    Other Plant, Animal, Environmental Angioedema     Entire body edema with fire ants    Milk Containing Products Diarrhea    Venom-Honey Bee Rash     Yellowjackets      Amoxicillin Rash    Clindamycin Hives and Rash    Pcn [Penicillins] Rash     States he is able to tolerate cephalexin with no adverse reaction    Sulfa (Sulfonamide Antibiotics) Rash        Assessment/Plan  Diagnoses and all orders for this visit:    1. Medicare annual wellness visit, subsequent- Donta Feng was counseled on age-appropriate/ guideline-based risk prevention behaviors and screening for a 76y.o. year old   male . We also discussed adjustments in screening based on family history if necessary. Printed instructions for preventative screening guidelines and healthy behaviors given to patient with after visit summary. 2. Bilateral carotid artery disease, unspecified type (HCC)-negative exam in the fall of last year. 3. Benign prostatic hyperplasia, unspecified whether lower urinary tract symptoms present-symptoms stable    4. Dyslipidemia-at goal  -     METABOLIC PANEL, COMPREHENSIVE; Future  -     LIPID PANEL; Future  -     CBC WITH AUTOMATED DIFF; Future    5. Osteoarthritis of both knees, unspecified osteoarthritis type-overall doing pretty well    6. James-tachy syndrome (HCC)-cardiac device    7. Thrombocytopenia (HCC)-we will repeat in 6 months    8. Advanced directives, counseling/discussion    9. Allergic rhinitis, unspecified seasonality, unspecified trigger  -     ipratropium (ATROVENT) 21 mcg (0.03 %) nasal spray; 2 Sprays by Left Nostril route every twelve (12) hours. 10. Hiatal hernia-patient can experiment to see if he still needs PPI. -     omeprazole (PRILOSEC) 20 mg capsule;  Take 1 Capsule by mouth daily. 11.  Retinal artery occlusion: Reason for addition of Plavix and switch of oral anticoagulants. Blood pressure controlled. Of note this occurred just before testing positive for COVID. Jeison Stevens MD  3/10/2023    This note was created with the help of speech recognition software Neeta Garcia) and may contain some 'sound alike' errors. This is the Subsequent Medicare Annual Wellness Exam, performed 12 months or more after the Initial AWV or the last Subsequent AWV    I have reviewed the patient's medical history in detail and updated the computerized patient record. Assessment/Plan   Education and counseling provided:  Are appropriate based on today's review and evaluation  End-of-Life planning (with patient's consent)  Prostate cancer screening tests (PSA, covered annually)  Colorectal cancer screening tests  Cardiovascular screening blood test    1. Medicare annual wellness visit, subsequent  2. Bilateral carotid artery disease, unspecified type ([facility] [address]  3. Benign prostatic hyperplasia, unspecified whether lower urinary tract symptoms present  4. Dyslipidemia  5. Osteoarthritis of both knees, unspecified osteoarthritis type  6. James-tachy syndrome ([facility] [address]  7. Thrombocytopenia ([facility] [address]  8.  Advanced directives, counseling/discussion       Depression Risk Factor Screening     3 most recent PHQ Screens 3/10/2023   PHQ Not Done -   Little interest or pleasure in doing things Not at all   Feeling down, depressed, irritable, or hopeless Not at all   Total Score PHQ 2 0   Trouble falling or staying asleep, or sleeping too much -   Feeling tired or having little energy -   Poor appetite, weight loss, or overeating -   Feeling bad about yourself - or that you are a failure or have let yourself or your family down -   Trouble concentrating on things such as school, work, reading, or watching TV -   Moving or speaking so slowly that other people could have noticed; or the opposite being so fidgety that others notice -   Thoughts of being better off dead, or hurting yourself in some way -   PHQ 9 Score -   How difficult have these problems made it for you to do your work, take care of your home and get along with others -       Alcohol & Drug Abuse Risk Screen   Do you average more than 1 drink per night or more than 7 drinks a week?: (P) No  In the past three months have you had more than 4 drinks containing alcohol on one occasion?: (P) No            Functional Ability and Level of Safety   Hearing:  Hearing: (P) Patient reports hearing is good      Activities of Daily Living: The home contains: (P) grab bars, rugs  Functional ADLs: (P) Patient does total self care     Ambulation:  Patient ambulates: (P) with no difficulty     Fall Risk:  Fall Risk Assessment, last 12 mths 3/10/2023   Able to walk? Yes   Fall in past 12 months? 0   Do you feel unsteady? 0   Are you worried about falling 0   Number of falls in past 12 months -   Fall with injury?  -     Abuse Screen:  Do you ever feel afraid of your partner?: (P) No  Are you in a relationship with someone who physically or mentally threatens you?: (P) No  Is it safe for you to go home?: (P) Yes        Cognitive Screening   Has your family/caregiver stated any concerns about your memory?: (P) Additional comments below  Additional comments about memory: (P) Mild teasing at times      Health Maintenance Due     Health Maintenance Due   Topic Date Due    COVID-19 Vaccine (4 - Booster for Chuckie Dieter series) 02/08/2022    Lipid Screen  03/04/2023       Patient Care Team   Patient Care Team:  Farahna Worthy MD as PCP - General (Internal Medicine Physician)  Farhana Worthy MD as PCP - REHABILITATION [facility] Empaneled Provider  Megan Reed MD as Surgeon (Orthopedic Surgery)  Lizette Esposito MD (Urology)  Paras Cooper MD (Ophthalmology)  Juwan Mark MD (Orthopedic Surgery)  Lizette Esposito MD (Urology)  Otoniel Patel MD as Physician (Gastroenterology)  Isamar Liu Elroy Morris MD as Physician (Cardiovascular Disease Physician)    History     Patient Active Problem List   Diagnosis Code    OA (osteoarthritis) of knee M17.9    Skin moles D22.9    Colon polyp K63.5    AR (allergic rhinitis) J30.9    Thyroid nodule E04.1    BPH (benign prostatic hyperplasia) N40.0    Atrial fibrillation (HCC) I48.91    Scoliosis M41.9    Family history of prostate cancer Z80.42    Chronic maxillary sinusitis J32.0    James-tachy syndrome ([address] I49.5    Dyslipidemia E78.5    Elevated Lp(a) E78.41    Cerebral infarction ([address] I63.9    Bilateral carotid artery disease, unspecified type (HCC) I77.9    Advanced care planning/counseling discussion Z71.89    Gastroesophageal reflux disease without esophagitis K21.9    Cardiac pacemaker in situ Z95.0    Excess skin of eyelid H02.30    Chronic fatigue R53.82    Other chest pain R07.89    Thrombocytopenia (HCC) D69.6    Irritable bowel syndrome with diarrhea K58.0     Past Medical History:   Diagnosis Date    AR (allergic rhinitis) 02/11/2009    Atrial fibrillation ([address] 2003 to present    BPH (benign prostatic hypertrophy)     James-tachy syndrome ([address] 03/27/2012    CAD (coronary artery disease)     Colon polyp 02/11/2009    COVID-19 12/01/2022    Dyslipidemia 06/05/2014    GERD (gastroesophageal reflux disease) fall 2013    OA (osteoarthritis) of knee 02/11/2009    Pacemaker     PSA elevation 07/2017    Skin moles 02/11/2009    Stroke ([address] 2015    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    Thyroid nodule 02/11/2009      Past Surgical History:   Procedure Laterality Date    COLONOSCOPY N/A 10/21/2019    COLONOSCOPY- Check for device orders performed by Avelina Castorena MD at [address] [place] [address]    ECHO 2D ADULT  8/15/11    EF 55%, biatrial enlargement, mild MR    ENDOSCOPY, COLON, DIAGNOSTIC      HX HEENT  9/2008    nasal septoplasty    HX KNEE ARTHROSCOPY  07/2016    HX KNEE REPLACEMENT Right 06/2021    HX ORTHOPAEDIC  07/01/2016    Right Knee Scope with meniscus surgery    HX PACEMAKER  Aug 2009    Dr. Steve Mckeon 108  06/2017    Right Eye laser treatment     HX VASECTOMY  25-30 yrs ago    [address] [employer]      pacemaker placement    STRESS TEST CARDIOLITE  4/2008    11 min., normal perfusion, EF 57%    VAS CAROTID DUPLEX BILATERAL  8/15/11    10-49% bilateral, unchanged from one year prior     Current Outpatient Medications   Medication Sig Dispense Refill    apixaban (Eliquis) 5 mg tablet Take 5 mg by mouth two (2) times a day. clopidogreL (Plavix) 75 mg tab Take 75 mg by mouth daily. lisinopriL (PRINIVIL, ZESTRIL) 2.5 mg tablet Take 2.5 mg by mouth daily. dicyclomine (BENTYL) 10 mg capsule TAKE 1 CAPSULE THREE TIMES A DAY AS NEEDED FOR ABDOMINAL CRAMPS OR CRAMPING 90 Capsule 11    tadalafiL (CIALIS) 5 mg tablet Take 5 mg by mouth daily as needed for Erectile Dysfunction. ipratropium (ATROVENT) 21 mcg (0.03 %) nasal spray USE 2 SPRAYS IN THE LEFT NOSTRIL EVERY 12 HOURS 30 mL 3    omeprazole (PRILOSEC) 20 mg capsule TAKE 1 CAPSULE DAILY 90 Capsule 3    atorvastatin (LIPITOR) 40 mg tablet Take 40 mg by mouth every evening.      loratadine (CLARITIN REDITABS) 10 mg dissolvable tablet Take 10 mg by mouth daily. metoprolol succinate (TOPROL-XL) 50 mg XL tablet Take 75 mg by mouth every evening. KRILL OIL PO Take 1 Cap by mouth daily. VOLTAREN 1 % gel as needed. B.animalis,bifid,infantis,long 10-15 mg TbEC Take 1 Cap by mouth daily. melatonin 5 mg cap capsule Take 5 mg by mouth nightly as needed. acetaminophen (TYLENOL) 500 mg tablet Take 2 tablets by mouth three (3) times daily as needed for Pain.      silodosin (RAPAFLO) 8 mg capsule Take 8 mg by mouth daily (with breakfast). GLUCOSAMINE HCL/CHONDRO DONALDSON A (GLUCOSAMINE-CHONDROITIN PO) Take  by mouth daily. cholecalciferol (VITAMIN D3) (1000 Units /25 mcg) tablet Take 2,000 Units by mouth every evening.       Pradaxa 150 mg capsule TAKE 1 CAPSULE TWICE A  Cap 0     Allergies   Allergen Reactions    Other Plant, Animal, Environmental Angioedema     Entire body edema with fire ants    Milk Containing Products Diarrhea    Venom-Honey Bee Rash     Yellowjackets      Amoxicillin Rash    Clindamycin Hives and Rash    Pcn [Penicillins] Rash     States he is able to tolerate cephalexin with no adverse reaction    Sulfa (Sulfonamide Antibiotics) Rash       Family History   Problem Relation Age of Onset    Stroke Mother     Cancer Father         prostate    Hypertension Father     Diabetes Brother     Cancer Brother         prostate    Diabetes Brother     Hypertension Brother     Cancer Brother         melanoma    Cancer Daughter         melanoma    Anesth Problems Neg Hx      Social History     Tobacco Use    Smoking status: Never    Smokeless tobacco: Never   Substance Use Topics    Alcohol use: No         Kyree Kilgore MD

## 2023-03-10 NOTE — ACP (ADVANCE CARE PLANNING)
Advance Care Planning     General Advance Care Planning (ACP) Conversation      Date of Conversation: 3/10/2023  Conducted with: Patient with Decision Making Capacity    Healthcare Decision Maker:     Primary Decision Maker: Nilda Tony - Spouse - [phone]  Click here to complete [employer] including selection of the Healthcare Decision Maker Relationship (ie \"Primary\")    Today we documented Decision Maker(s) consistent with Legal Next of Kin hierarchy. Content/Action Overview:    Has ACP document(s) NOT on file - requested patient to provide      Length of Voluntary ACP Conversation in minutes:  <16 minutes (Non-Billable)    Cesar García MD

## 2023-04-05 NOTE — PROGRESS NOTES
CARDIOLOGY OFFICE NOTE    Shane Linder MD, [address], [place], [address] [facility]  Phone [phone]; Fax [phone]  Mobile [phone]   Voice Mail [phone]    Primary care: Sarah Romero MD       ATTENTION:   This medical record was transcribed using an electronic medical records/speech recognition system. Although proofread, it may and can contain electronic, spelling and other errors. Corrections may be executed at a later time. Please feel free to contact us for any clarifications as needed. Armando Parish is a 76 y.o. male with  referred for atrial fibrillation, dyslipidemia          Cardiac risk factors: dyslipidemia, male gender, hypertension  I have personally obtained the history from the patient. HISTORY OF PRESENTING ILLNESS    Morning this is MrWilly and Mrs. Avalos have I met you guys before now  -No BMI reason for    Ms./Mr. Armando Parish  76 y.o. is very pleasant gentleman who was followed by Dr. Abilio Sanford in the past is followed for atrial fibrillation diagnosed in 2003. He also has a history of sinus node dysfunction status post dual-chamber pacemaker and August 2009 and it was a [address] pacemaker. He has a history of orthostatic hypotension mild carotid disease. He was followed [facility] cardiology at that time he was complaining of fatigue after 1 hour of aerobic activity. He did get device check and change the river but will need to be followed here from now on. He has some issues with bleeding when he accidentally hits his arm on something we talked about the Watchman device initiated and video on as well. He should be seeing  soon.        ACTIVE PROBLEM LIST     Patient Active Problem List    Diagnosis Date Noted    Thrombocytopenia ([facility] 02/18/2020    Irritable bowel syndrome with diarrhea 02/18/2020    Chronic fatigue 01/18/2020    Other chest pain 01/18/2020    Cardiac pacemaker in situ 12/28/2016 Gastroesophageal reflux disease without esophagitis 08/05/2016    Advanced care planning/counseling discussion 05/05/2016    Excess skin of eyelid 04/25/2016    Bilateral carotid artery disease, unspecified type ([address] 11/26/2014    Dyslipidemia 06/05/2014    Elevated Lp(a) 06/05/2014    Cerebral infarction ([address] 03/15/2014    James-tachy syndrome ([address] 03/27/2012    Chronic maxillary sinusitis 12/08/2011    Family history of prostate cancer 09/16/2011    Scoliosis 04/26/2011    Atrial fibrillation ([address] 03/22/2010    BPH (benign prostatic hyperplasia) 01/27/2010    OA (osteoarthritis) of knee 02/11/2009    Skin moles 02/11/2009    Colon polyp 02/11/2009    AR (allergic rhinitis) 02/11/2009    Thyroid nodule 02/11/2009           PAST MEDICAL HISTORY     Past Medical History:   Diagnosis Date    AR (allergic rhinitis) 02/11/2009    Atrial fibrillation ([address] 2003 to present    BPH (benign prostatic hypertrophy)     James-tachy syndrome ([address] 03/27/2012    CAD (coronary artery disease)     Colon polyp 02/11/2009    COVID-19 12/01/2022    Dyslipidemia 06/05/2014    GERD (gastroesophageal reflux disease) fall 2013    OA (osteoarthritis) of knee 02/11/2009    Pacemaker     PSA elevation 07/2017    Skin moles 02/11/2009    Stroke ([address] 2015    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 Ja Thurston  06/2017    Right Eye laser treatment     HX VASECTOMY  25-30 yrs ago [place] UNLISTED PROCEDURE CARDIAC SURGERY      pacemaker placement    STRESS TEST CARDIOLITE  4/2008    11 min., normal perfusion, EF 57%    VAS CAROTID DUPLEX BILATERAL  8/15/11    10-49% bilateral, unchanged from one year prior          ALLERGIES     Allergies   Allergen Reactions    Other Plant, Animal, Environmental Angioedema     Entire body edema with fire ants    Milk Containing Products Diarrhea    Venom-Honey Bee Rash     Yellowjackets      Amoxicillin Rash    Clindamycin Hives and Rash    Pcn [Penicillins] Rash     States he is able to tolerate cephalexin with no adverse reaction    Sulfa (Sulfonamide Antibiotics) Rash          FAMILY HISTORY     Family History   Problem Relation Age of Onset    Stroke Mother     Cancer Father         prostate    Hypertension Father     Diabetes Brother     Cancer Brother         prostate    Diabetes Brother     Hypertension Brother     Cancer Brother         melanoma    Cancer Daughter         melanoma    Anesth Problems Neg Hx     negative for cardiac disease       SOCIAL HISTORY     Social History     Socioeconomic History    Marital status:    Tobacco Use    Smoking status: Never    Smokeless tobacco: Never   Vaping Use    Vaping Use: Never used   Substance and Sexual Activity    Alcohol use: No    Drug use: No    Sexual activity: Not Currently         MEDICATIONS     Current Outpatient Medications   Medication Sig    dicyclomine (BENTYL) 10 mg capsule Take 1 Capsule by mouth two (2) times a day. apixaban (ELIQUIS) 5 mg tablet Take 1 Tablet by mouth two (2) times a day. clopidogreL (PLAVIX) 75 mg tab Take 1 Tablet by mouth daily. lisinopriL (PRINIVIL, ZESTRIL) 2.5 mg tablet Take 1 Tablet by mouth daily. ipratropium (ATROVENT) 21 mcg (0.03 %) nasal spray 2 Sprays by Left Nostril route every twelve (12) hours. omeprazole (PRILOSEC) 20 mg capsule Take 1 Capsule by mouth daily. tadalafiL (CIALIS) 5 mg tablet Take 1 Tablet by mouth every other day. atorvastatin (LIPITOR) 40 mg tablet Take 1 Tablet by mouth every evening.    loratadine (CLARITIN REDITABS) 10 mg dissolvable tablet Take 1 Tablet by mouth daily. metoprolol succinate (TOPROL-XL) 50 mg XL tablet Take 1.5 Tablets by mouth every evening. KRILL OIL PO Take 1 Cap by mouth daily. VOLTAREN 1 % gel as needed. B.animalis,bifid,infantis,long 10-15 mg TbEC Take 1 Cap by mouth daily. melatonin 5 mg cap capsule Take 1 Capsule by mouth nightly as needed. acetaminophen (TYLENOL) 500 mg tablet Take 2 Tablets by mouth three (3) times daily as needed for Pain.    silodosin (RAPAFLO) 8 mg capsule Take 1 Capsule by mouth daily (with breakfast). GLUCOSAMINE HCL/CHONDRO DONALDSON A (GLUCOSAMINE-CHONDROITIN PO) Take  by mouth daily. cholecalciferol (VITAMIN D3) (1000 Units /25 mcg) tablet Take 2 Tablets by mouth every evening. No current facility-administered medications for this visit. I have reviewed the nurses notes, vitals, problem list, allergy list, medical history, family, social history and medications. REVIEW OF SYMPTOMS    As per HPI  General: Pt denies excessive weight gain or loss. Pt is able to conduct ADL's  HEENT: Denies blurred vision, headaches, hearing loss, epistaxis and difficulty swallowing. Respiratory: Denies cough, congestion, shortness of breath, CONTRERAS, wheezing or stridor. Cardiovascular: Denies precordial pain, palpitations, edema or PND  Gastrointestinal: Denies poor appetite, indigestion, abdominal pain or blood in stool  Genitourinary: Denies hematuria, dysuria, increased urinary frequency  Musculoskeletal: Denies joint pain or swelling from muscles or joints  Neurologic: Denies tremor, paresthesias, headache, or sensory motor disturbance  Psychiatric: Denies confusion, insomnia, depression  Integumentray: Denies rash, itching or ulcers.   Hematologic: Denies easy bruising, bleeding     PHYSICAL EXAMINATION      Vitals:    04/06/23 0916 04/06/23 0928   BP: (!) 160/82 (!) 140/70   Pulse: 72    Weight: 209 lb (94.8 kg)    Height: 6' 2\" (1.88 m)      General: Well developed, in no acute distress. HEENT: No jaundice, oral mucosa moist,  Neck: Supple, no stiffness, no lymphadenopathy, supple  Heart:  Normal S1/S2 negative S3 or S4. Regular, no murmur, gallop or rub, no jugular venous distention  Respiratory: Clear bilaterally x 4, no wheezing or rales  Extremities:  No edema, normal cap refill, no cyanosis. Musculoskeletal: No clubbing, no deformities  Neuro: A&Ox3, speech clear, gait stable, cooperative, no focal neurologic deficits  Skin: Skin color is normal. No rashes or lesions. Non diaphoretic, moist.           DIAGNOSTIC DATA     1. Stress Test  ETT April 2012 - 8 min, resting HR 81, peak   Exe Cardiolite 2/5/20 - 9:01. Normal perfusion. 2. Carotid duplex   8/17/2012 - 10-49% bilateral  4/15/14 - 10-49% plaque bilateral, unchanged  6/8/15: 10-49% bilateral stenosis; unchanged. 6/24/16 - 10-49% bilaterally, unchanged  4/18/17 - 10-49% bilaterally, unchanged. 7/18/19 - 10-49% bilateral, unchanged from 4/18/17 12/23/22- <50% glenroy    3. Echo  3/4/13: EF 55-60%; LA: moderately dilated; MV and TV: mild regurg.; no sig. hange from previous echo  KACEY 4/17/2014 - no intracardiac mass/thrombus or shunt, mild to moderate aortic arch plaque, EF 50%, moderate LAE, mild MR/AR.  12/21/15 - EF 65 %. No WMA. Moderate WYATT. Mild TR. Mild pulmonary HTN with PASP 38mmHg. 2/5/20 - EF 60-65%. No WMA. Mod LAE. Mild dilated RA. Mild AoV sclerosis without AS. Mild AR. Mild TR. No Pulm HTN. Trace MR.  1/27/22- EF 60-65%, mod LVH, WYATT, AV tricuspid, mild AI, AV cusps appear mildly sclerotic, mild/mod TR, PAP 30 mmHg    1/26/23- EF 55-60%, LAE, mild MR/AI    4. 3/28/17- SJM pacemaker generator change per Dr. Nic Riley    5.  Lipids  3/4/22- , HDL 56, LDL 73.6, TG 87  1/17/23- , HDL 55, LDL 75, LDL-P 820, TG 65         LABORATORY DATA       Lab Results   Component Value Date/Time    WBC 4.9 03/04/2022 09:18 AM    HGB 16.7 03/04/2022 09:18 AM    HCT 53.9 (H) 03/04/2022 09:18 AM    PLATELET 654 17/63/4015 09:18 AM    MCV 90.0 03/04/2022 09:18 AM      Lab Results   Component Value Date/Time    Sodium 140 03/04/2022 09:18 AM    Potassium 4.5 03/04/2022 09:18 AM    Chloride 107 03/04/2022 09:18 AM    CO2 31 03/04/2022 09:18 AM    Anion gap 2 (L) 03/04/2022 09:18 AM    Glucose 91 01/16/2023 12:00 AM    BUN 18 01/16/2023 12:00 AM    Creatinine 1.15 01/16/2023 12:00 AM    BUN/Creatinine ratio 16 01/16/2023 12:00 AM    GFR est AA >60 03/04/2022 09:18 AM    GFR est non-AA >60 03/04/2022 09:18 AM    Calcium 9.2 03/04/2022 09:18 AM    Bilirubin, total 1.9 01/16/2023 12:00 AM    Alk. phosphatase 121 (H) 03/04/2022 09:18 AM    Protein, total 7.2 03/04/2022 09:18 AM    Protein, Total 7.2 01/16/2023 12:00 AM    Albumin 4.0 03/04/2022 09:18 AM    Globulin 3.2 03/04/2022 09:18 AM    A-G Ratio 1.3 03/04/2022 09:18 AM    ALT (SGPT) 22 03/04/2022 09:18 AM            ICD-10-CM ICD-9-CM   1. Cardiac pacemaker in situ  Z95.0 V45.01   2. Atrial fibrillation, unspecified type ([facility] [address]  I48.91 427.31   3. Dyslipidemia  E78.5 272.4   4. Cerebral infarction, unspecified mechanism ([address]  I63.9 434.91   5. Bilateral carotid artery disease, unspecified type (HCC)  I77.9 447.9        ASSESSMENT/RECOMMENDATIONS:.      1.  Atrial fibrillation status post PPM for sick sinus syndrome  -No particular irregularity in his heart rhythm  -He will experience bleeding if he hits his arm on something but he says when he is shaving he has no episodes where it is difficult to control eating. He is on Plavix and Eliquis. The Plavix for his CVA. I educated him on the Watchman device and showed him a video on implantation  2. Dyslipidemia  -LDL is at goal at 74  3. CVA  -Continue Plavix  4.   Carotid carotid disease  -Repeat carotids in the near future last 1 was done in 2022 less than 50% bilateral disease    Orders Placed This Encounter    dicyclomine (BENTYL) 10 mg capsule     Sig: Take 1 Capsule by mouth two (2) times a day. We discussed the expected course, resolution and complications of the diagnosis(es) in detail. Medication risks, benefits, costs, interactions, and alternatives were discussed as indicated. I advised him to contact the office if his condition worsens, changes or fails to improve as anticipated. He expressed understanding with the diagnosis(es) and plan          Follow-up and Dispositions    Return in about 6 months (around 10/6/2023). I have discussed the diagnosis with  Genevieve Escobar and the intended plan as seen in the above orders. Questions were answered concerning future plans. I have discussed medication side effects and warnings with the patient as well. Thank you,  Valentino Mulder, MD for involving me in the care of  Genevieve Escobar. Please do not hesitate to contact me for further questions/concerns. Shane Valencia MD, [facility] [address] [place] [address], [address] [place] [address] [address]      [phone] / [phone] Fax

## 2023-04-05 NOTE — PATIENT INSTRUCTIONS
1) if there is any bleeding issues that he may experience she could always consider doing the Watchman device. You can google Watchman device and look at the videos on it. It is  my pleasure to have the opportunity to be involved in taking care of you and to provide you the best cardiac care. At the end of every visit I  encourage you to eat healthy and clean and reduce your red meat intake as well as exercise 30 minutes 5 days a week to ensure a healthy heart. If you are a smoker , it will be essential that you stop smoking to reduce your risk of heart disease. Part of providing you the best heart care possible IS AN ACCURATE KNOWLEDGE OF YOUR MEDICINE. It  will be  essential at each office visit that you provide me with an accurate list of your medicines. When you come into the office you should have that list or another option is lining up your pill bottles  and taking a snapshot with your cell phone of all the medicines you are taking currently and show it to the nurse in the examining room. Inaccurate reporting of your medication to the nurse may lead to adverse events and medical errors. Thank you again for giving me the opportunity to be part of your heart care and it is my pleasure. All the best,    Shane Costello MD

## 2023-04-06 ENCOUNTER — OFFICE VISIT (OUTPATIENT)
Dept: CARDIOLOGY CLINIC | Age: 78
End: 2023-04-06
Payer: MEDICARE

## 2023-04-06 PROCEDURE — 99204 OFFICE O/P NEW MOD 45 MIN: CPT | Performed by: SPECIALIST

## 2023-04-06 PROCEDURE — 1123F ACP DISCUSS/DSCN MKR DOCD: CPT | Performed by: SPECIALIST

## 2023-04-06 PROCEDURE — 1101F PT FALLS ASSESS-DOCD LE1/YR: CPT | Performed by: SPECIALIST

## 2023-04-06 PROCEDURE — G8417 CALC BMI ABV UP PARAM F/U: HCPCS | Performed by: SPECIALIST

## 2023-04-06 PROCEDURE — G8536 NO DOC ELDER MAL SCRN: HCPCS | Performed by: SPECIALIST

## 2023-04-06 PROCEDURE — G8432 DEP SCR NOT DOC, RNG: HCPCS | Performed by: SPECIALIST

## 2023-04-06 PROCEDURE — G8427 DOCREV CUR MEDS BY ELIG CLIN: HCPCS | Performed by: SPECIALIST

## 2023-04-06 NOTE — LETTER
4/6/2023    Patient: Bee Lucas   YOB: 1945   Date of Visit: 4/6/2023     Cheyenne Bardales MD  [address] [facility]  [address]  [address] [place] [address]  [place] [place]    Dear Cheyenne Bardales MD,      Thank you for referring Mr. Chong Luo to [address] [place] [address] for evaluation. My notes for this consultation are attached. If you have questions, please do not hesitate to call me. I look forward to following your patient along with you.       Sincerely,    Jes Garner MD

## 2023-04-22 DIAGNOSIS — J31.0 RHINITIS, UNSPECIFIED TYPE: ICD-10-CM

## 2023-04-22 RX ORDER — FLUTICASONE PROPIONATE 50 MCG
SPRAY, SUSPENSION (ML) NASAL
Qty: 48 G | Refills: 3 | Status: SHIPPED | OUTPATIENT
Start: 2023-04-22

## 2023-04-23 DIAGNOSIS — E78.5 DYSLIPIDEMIA: Primary | ICD-10-CM

## 2023-04-24 DIAGNOSIS — E78.5 DYSLIPIDEMIA: Primary | ICD-10-CM

## 2023-05-15 ENCOUNTER — PATIENT MESSAGE (OUTPATIENT)
Age: 78
End: 2023-05-15

## 2023-05-15 RX ORDER — IPRATROPIUM BROMIDE 21 UG/1
2 SPRAY, METERED NASAL EVERY 12 HOURS
Qty: 30 ML | Refills: 3 | Status: SHIPPED | OUTPATIENT
Start: 2023-05-15

## 2023-05-15 NOTE — TELEPHONE ENCOUNTER
From: Merlin Hassan  To: Dr. Leal Board: 5/15/2023 11:24 AM EDT  Subject: Prescription refill    Could you send a renewal for ibraproprium nasal spray to [facility]

## 2023-05-16 RX ORDER — ATORVASTATIN CALCIUM 40 MG/1
40 TABLET, FILM COATED ORAL EVERY EVENING
Qty: 90 TABLET | Refills: 3 | Status: SHIPPED | OUTPATIENT
Start: 2023-05-16

## 2023-05-16 RX ORDER — METOPROLOL SUCCINATE 50 MG/1
50 TABLET, EXTENDED RELEASE ORAL EVERY EVENING
Qty: 90 TABLET | Refills: 3 | Status: SHIPPED | OUTPATIENT
Start: 2023-05-16

## 2023-05-16 RX ORDER — CLOPIDOGREL BISULFATE 75 MG/1
75 TABLET ORAL DAILY
Qty: 90 TABLET | Refills: 3 | Status: SHIPPED | OUTPATIENT
Start: 2023-05-16

## 2023-05-16 RX ORDER — LISINOPRIL 2.5 MG/1
2.5 TABLET ORAL DAILY
Qty: 90 TABLET | Refills: 3 | Status: SHIPPED | OUTPATIENT
Start: 2023-05-16

## 2023-05-26 RX ORDER — DICYCLOMINE HYDROCHLORIDE 10 MG/1
CAPSULE ORAL
Qty: 120 CAPSULE | Refills: 1 | Status: SHIPPED | OUTPATIENT
Start: 2023-05-26

## 2023-07-27 PROBLEM — Z79.01 ANTICOAGULATION ADEQUATE: Status: ACTIVE | Noted: 2023-07-27

## 2023-07-28 ENCOUNTER — PROCEDURE VISIT (OUTPATIENT)
Age: 78
End: 2023-07-28
Payer: MEDICARE

## 2023-07-28 ENCOUNTER — OFFICE VISIT (OUTPATIENT)
Age: 78
End: 2023-07-28
Payer: MEDICARE

## 2023-07-28 VITALS
DIASTOLIC BLOOD PRESSURE: 64 MMHG | WEIGHT: 207.2 LBS | HEIGHT: 74 IN | BODY MASS INDEX: 26.59 KG/M2 | RESPIRATION RATE: 16 BRPM | OXYGEN SATURATION: 95 % | HEART RATE: 68 BPM | SYSTOLIC BLOOD PRESSURE: 108 MMHG

## 2023-07-28 DIAGNOSIS — Z95.0 PRESENCE OF CARDIAC PACEMAKER: Primary | ICD-10-CM

## 2023-07-28 DIAGNOSIS — D69.6 THROMBOCYTOPENIA (HCC): ICD-10-CM

## 2023-07-28 DIAGNOSIS — E78.5 DYSLIPIDEMIA: ICD-10-CM

## 2023-07-28 DIAGNOSIS — Z95.0 CARDIAC PACEMAKER IN SITU: ICD-10-CM

## 2023-07-28 DIAGNOSIS — I49.5 BRADY-TACHY SYNDROME (HCC): ICD-10-CM

## 2023-07-28 DIAGNOSIS — Z79.01 ANTICOAGULATION ADEQUATE: ICD-10-CM

## 2023-07-28 DIAGNOSIS — I48.0 PAROXYSMAL ATRIAL FIBRILLATION (HCC): Primary | ICD-10-CM

## 2023-07-28 PROCEDURE — 93005 ELECTROCARDIOGRAM TRACING: CPT | Performed by: INTERNAL MEDICINE

## 2023-07-28 PROCEDURE — 99215 OFFICE O/P EST HI 40 MIN: CPT | Performed by: INTERNAL MEDICINE

## 2023-07-28 PROCEDURE — 93279 PRGRMG DEV EVAL PM/LDLS PM: CPT | Performed by: INTERNAL MEDICINE

## 2023-07-28 RX ORDER — FLUTICASONE PROPIONATE 50 MCG
2 SPRAY, SUSPENSION (ML) NASAL DAILY
COMMUNITY
Start: 2023-06-28

## 2023-07-28 RX ORDER — CYANOCOBALAMIN (VITAMIN B-12) 500 MCG
TABLET ORAL DAILY
COMMUNITY

## 2023-07-28 NOTE — PATIENT INSTRUCTIONS
Schedule CT scan per watchman protocol  Schedule Watchman implantation  Hold Eliquis on the AM of procedure    Please call Renard Castaneda when you are ready to schedule - [phone]

## 2023-08-02 ENCOUNTER — TELEPHONE (OUTPATIENT)
Age: 78
End: 2023-08-02

## 2023-08-02 NOTE — TELEPHONE ENCOUNTER
Pt is calling to get schedule for the Watchman. Can you please write up the sheet? If this is what Dr. Bita Lozoya wanted.

## 2023-08-08 ENCOUNTER — TELEPHONE (OUTPATIENT)
Age: 78
End: 2023-08-08

## 2023-08-08 NOTE — TELEPHONE ENCOUNTER
Spoke with Pt of Afia/GORGE hoskins/Dr. Sheri Landau at [facility]. For 10/25/23 At 8am arrive at 6:15am Pt aware that they need a  NPO from [address] the night before. Check in at the First floor Outpt. Reg. Desk.  Pt is to have Labs done between 9/25/23-10/18/23 Medications:  Hold Eliquis day of procedure   VO by Jaspreet Hall

## 2023-08-17 ENCOUNTER — HOSPITAL ENCOUNTER (OUTPATIENT)
Facility: HOSPITAL | Age: 78
Discharge: HOME | End: 2023-08-17
Attending: INTERNAL MEDICINE
Payer: MEDICARE

## 2023-08-17 DIAGNOSIS — I48.0 PAROXYSMAL ATRIAL FIBRILLATION (HCC): ICD-10-CM

## 2023-08-17 DIAGNOSIS — D69.6 THROMBOCYTOPENIA (HCC): ICD-10-CM

## 2023-08-17 DIAGNOSIS — E78.5 DYSLIPIDEMIA: ICD-10-CM

## 2023-08-17 DIAGNOSIS — Z95.0 CARDIAC PACEMAKER IN SITU: ICD-10-CM

## 2023-08-17 DIAGNOSIS — I49.5 BRADY-TACHY SYNDROME (HCC): ICD-10-CM

## 2023-08-17 DIAGNOSIS — Z79.01 ANTICOAGULATION ADEQUATE: ICD-10-CM

## 2023-08-17 PROCEDURE — 6360000004 HC RX CONTRAST MEDICATION: Performed by: INTERNAL MEDICINE

## 2023-08-17 PROCEDURE — 82565 ASSAY OF CREATININE: CPT

## 2023-08-17 PROCEDURE — 71275 CT ANGIOGRAPHY CHEST: CPT

## 2023-08-17 RX ADMIN — IOPAMIDOL 100 ML: 755 INJECTION, SOLUTION INTRAVENOUS at 08:29

## 2023-08-18 LAB — CREAT BLD-MCNC: 1.1 MG/DL (ref 0.6–1.3)

## 2023-09-07 DIAGNOSIS — E78.5 DYSLIPIDEMIA: ICD-10-CM

## 2023-09-07 DIAGNOSIS — I49.5 BRADY-TACHY SYNDROME (HCC): ICD-10-CM

## 2023-09-07 DIAGNOSIS — Z95.0 CARDIAC PACEMAKER IN SITU: ICD-10-CM

## 2023-09-07 DIAGNOSIS — I48.0 PAROXYSMAL ATRIAL FIBRILLATION (HCC): ICD-10-CM

## 2023-09-07 DIAGNOSIS — D69.6 THROMBOCYTOPENIA (HCC): ICD-10-CM

## 2023-09-07 DIAGNOSIS — Z79.01 ANTICOAGULATION ADEQUATE: ICD-10-CM

## 2023-09-07 LAB
ANION GAP SERPL CALC-SCNC: 1 MMOL/L (ref 5–15)
BUN SERPL-MCNC: 20 MG/DL (ref 6–20)
BUN/CREAT SERPL: 19 (ref 12–20)
CALCIUM SERPL-MCNC: 9.1 MG/DL (ref 8.5–10.1)
CHLORIDE SERPL-SCNC: 107 MMOL/L (ref 97–108)
CO2 SERPL-SCNC: 30 MMOL/L (ref 21–32)
CREAT SERPL-MCNC: 1.03 MG/DL (ref 0.7–1.3)
ERYTHROCYTE [DISTWIDTH] IN BLOOD BY AUTOMATED COUNT: 13.6 % (ref 11.5–14.5)
GLUCOSE SERPL-MCNC: 94 MG/DL (ref 65–100)
HCT VFR BLD AUTO: 50.6 % (ref 36.6–50.3)
HGB BLD-MCNC: 16.3 G/DL (ref 12.1–17)
MCH RBC QN AUTO: 29.9 PG (ref 26–34)
MCHC RBC AUTO-ENTMCNC: 32.2 G/DL (ref 30–36.5)
MCV RBC AUTO: 92.7 FL (ref 80–99)
NRBC # BLD: 0 K/UL (ref 0–0.01)
NRBC BLD-RTO: 0 PER 100 WBC
PLATELET # BLD AUTO: 159 K/UL (ref 150–400)
PMV BLD AUTO: 9.8 FL (ref 8.9–12.9)
POTASSIUM SERPL-SCNC: 4.8 MMOL/L (ref 3.5–5.1)
RBC # BLD AUTO: 5.46 M/UL (ref 4.1–5.7)
SODIUM SERPL-SCNC: 138 MMOL/L (ref 136–145)
WBC # BLD AUTO: 4.8 K/UL (ref 4.1–11.1)

## 2023-09-12 DIAGNOSIS — E78.5 HYPERLIPIDEMIA, UNSPECIFIED HYPERLIPIDEMIA TYPE: Primary | ICD-10-CM

## 2023-09-13 DIAGNOSIS — E78.5 HYPERLIPIDEMIA, UNSPECIFIED HYPERLIPIDEMIA TYPE: ICD-10-CM

## 2023-09-13 LAB
ALBUMIN SERPL-MCNC: 3.9 G/DL (ref 3.5–5)
ALBUMIN/GLOB SERPL: 1.3 (ref 1.1–2.2)
ALP SERPL-CCNC: 110 U/L (ref 45–117)
ALT SERPL-CCNC: 21 U/L (ref 12–78)
AST SERPL-CCNC: 23 U/L (ref 15–37)
BILIRUB DIRECT SERPL-MCNC: 0.4 MG/DL (ref 0–0.2)
BILIRUB SERPL-MCNC: 1.4 MG/DL (ref 0.2–1)
CHOLEST SERPL-MCNC: 130 MG/DL
COMMENT:: NORMAL
GLOBULIN SER CALC-MCNC: 3 G/DL (ref 2–4)
HDLC SERPL-MCNC: 55 MG/DL
HDLC SERPL: 2.4 (ref 0–5)
LDLC SERPL CALC-MCNC: 63 MG/DL (ref 0–100)
PROT SERPL-MCNC: 6.9 G/DL (ref 6.4–8.2)
SPECIMEN HOLD: NORMAL
TRIGL SERPL-MCNC: 60 MG/DL
VLDLC SERPL CALC-MCNC: 12 MG/DL

## 2023-09-16 SDOH — ECONOMIC STABILITY: FOOD INSECURITY: WITHIN THE PAST 12 MONTHS, YOU WORRIED THAT YOUR FOOD WOULD RUN OUT BEFORE YOU GOT MONEY TO BUY MORE.: NEVER TRUE

## 2023-09-16 SDOH — ECONOMIC STABILITY: TRANSPORTATION INSECURITY
IN THE PAST 12 MONTHS, HAS LACK OF TRANSPORTATION KEPT YOU FROM MEETINGS, WORK, OR FROM GETTING THINGS NEEDED FOR DAILY LIVING?: NO

## 2023-09-16 SDOH — ECONOMIC STABILITY: FOOD INSECURITY: WITHIN THE PAST 12 MONTHS, THE FOOD YOU BOUGHT JUST DIDN'T LAST AND YOU DIDN'T HAVE MONEY TO GET MORE.: NEVER TRUE

## 2023-09-16 SDOH — ECONOMIC STABILITY: INCOME INSECURITY: HOW HARD IS IT FOR YOU TO PAY FOR THE VERY BASICS LIKE FOOD, HOUSING, MEDICAL CARE, AND HEATING?: NOT HARD AT ALL

## 2023-09-16 SDOH — ECONOMIC STABILITY: HOUSING INSECURITY
IN THE LAST 12 MONTHS, WAS THERE A TIME WHEN YOU DID NOT HAVE A STEADY PLACE TO SLEEP OR SLEPT IN A SHELTER (INCLUDING NOW)?: NO

## 2023-09-19 ENCOUNTER — OFFICE VISIT (OUTPATIENT)
Age: 78
End: 2023-09-19
Payer: MEDICARE

## 2023-09-19 VITALS
SYSTOLIC BLOOD PRESSURE: 125 MMHG | TEMPERATURE: 97.5 F | RESPIRATION RATE: 16 BRPM | BODY MASS INDEX: 26.95 KG/M2 | DIASTOLIC BLOOD PRESSURE: 71 MMHG | HEART RATE: 60 BPM | OXYGEN SATURATION: 96 % | HEIGHT: 74 IN | WEIGHT: 210 LBS

## 2023-09-19 DIAGNOSIS — K21.9 GASTROESOPHAGEAL REFLUX DISEASE, UNSPECIFIED WHETHER ESOPHAGITIS PRESENT: ICD-10-CM

## 2023-09-19 DIAGNOSIS — E78.5 DYSLIPIDEMIA: ICD-10-CM

## 2023-09-19 DIAGNOSIS — I63.9 CEREBRAL INFARCTION, UNSPECIFIED MECHANISM (HCC): ICD-10-CM

## 2023-09-19 DIAGNOSIS — I48.0 PAROXYSMAL ATRIAL FIBRILLATION (HCC): Primary | ICD-10-CM

## 2023-09-19 DIAGNOSIS — K58.0 IRRITABLE BOWEL SYNDROME WITH DIARRHEA: ICD-10-CM

## 2023-09-19 PROCEDURE — 1036F TOBACCO NON-USER: CPT | Performed by: INTERNAL MEDICINE

## 2023-09-19 PROCEDURE — G8427 DOCREV CUR MEDS BY ELIG CLIN: HCPCS | Performed by: INTERNAL MEDICINE

## 2023-09-19 PROCEDURE — 1123F ACP DISCUSS/DSCN MKR DOCD: CPT | Performed by: INTERNAL MEDICINE

## 2023-09-19 PROCEDURE — G8419 CALC BMI OUT NRM PARAM NOF/U: HCPCS | Performed by: INTERNAL MEDICINE

## 2023-09-19 PROCEDURE — 99214 OFFICE O/P EST MOD 30 MIN: CPT | Performed by: INTERNAL MEDICINE

## 2023-09-19 RX ORDER — DICYCLOMINE HYDROCHLORIDE 10 MG/1
10 CAPSULE ORAL 2 TIMES DAILY
Qty: 180 CAPSULE | Refills: 1 | Status: SHIPPED | OUTPATIENT
Start: 2023-09-19 | End: 2023-09-22 | Stop reason: SDUPTHER

## 2023-09-19 ASSESSMENT — ENCOUNTER SYMPTOMS
DIARRHEA: 0
WHEEZING: 0
CONSTIPATION: 0
BACK PAIN: 0
SHORTNESS OF BREATH: 0
ABDOMINAL PAIN: 0

## 2023-09-19 NOTE — PROGRESS NOTES
Ania Damon is a 78 y.o. male who presents today for No chief complaint on file. Titi Dias He has a history of   Patient Active Problem List   Diagnosis    Excess skin of eyelid    Gastroesophageal reflux disease without esophagitis    OA (osteoarthritis) of knee    Scoliosis    Elevated Lp(a)    Irritable bowel syndrome with diarrhea    Dyslipidemia    Cerebral infarction (HCC)    Thrombocytopenia (HCC)    Thyroid nodule    Cardiac pacemaker in situ    Colon polyp    BPH (benign prostatic hyperplasia)    Aron-tachy syndrome (HCC)    Atrial fibrillation (HCC)    Bilateral carotid artery disease, unspecified type (HCC)    Chronic fatigue    Chronic maxillary sinusitis    Other chest pain    AR (allergic rhinitis)    Family history of prostate cancer    Advanced care planning/counseling discussion    Anticoagulation adequate   . Today patient is here for follow up.   he does not have other concerns. Hyperlipidemia-lipids are at goal.  Continue current statin therapy. ROS: taking medications as instructed, no medication side effects noted  No new myalgias, no joint pains, no weakness  No TIA's, no chest pain on exertion, no dyspnea on exertion, no swelling of ankles. Lab Results   Component Value Date/Time    CHOL 130 09/13/2023 10:38 AM    HDL 55 09/13/2023 10:38 AM     Hypertension-denies any orthostatic symptoms  Hypertension ROS: taking medications as instructed, no medication side effects noted, no TIA's, no chest pain on exertion, no dyspnea on exertion, no swelling of ankles     reports that he has never smoked. He has never used smokeless tobacco.    reports that he does not currently use alcohol. BP Readings from Last 2 Encounters:   09/19/23 125/71   07/28/23 108/64     A-fib: Patient is current on 75 mg of metoprolol. Plan is to proceed with placement of Watchman device. This is scheduled for late October.   Reports that he is told that he should be able to come off all anticoagulation with

## 2023-09-22 ENCOUNTER — TELEPHONE (OUTPATIENT)
Age: 78
End: 2023-09-22

## 2023-09-22 DIAGNOSIS — K58.0 IRRITABLE BOWEL SYNDROME WITH DIARRHEA: ICD-10-CM

## 2023-09-22 RX ORDER — DICYCLOMINE HYDROCHLORIDE 10 MG/1
10 CAPSULE ORAL 2 TIMES DAILY
Qty: 180 CAPSULE | Refills: 1 | Status: SHIPPED | OUTPATIENT
Start: 2023-09-22

## 2023-09-22 NOTE — TELEPHONE ENCOUNTER
Gutierrez Mast from [facility] is requesting a call back.  The pharmacy Needs a clarification on dicyclomine (BENTYL) 10 MG capsule   (direction are needed)    P:[phone]   Ref# [id number]

## 2023-09-25 ENCOUNTER — PREP FOR PROCEDURE (OUTPATIENT)
Age: 78
End: 2023-09-25

## 2023-10-03 RX ORDER — SODIUM CHLORIDE 0.9 % (FLUSH) 0.9 %
5-40 SYRINGE (ML) INJECTION PRN
Status: CANCELLED | OUTPATIENT
Start: 2023-10-03

## 2023-10-03 RX ORDER — SODIUM CHLORIDE 9 MG/ML
INJECTION, SOLUTION INTRAVENOUS PRN
Status: CANCELLED | OUTPATIENT
Start: 2023-10-03

## 2023-10-03 RX ORDER — SODIUM CHLORIDE 0.9 % (FLUSH) 0.9 %
5-40 SYRINGE (ML) INJECTION EVERY 12 HOURS SCHEDULED
Status: CANCELLED | OUTPATIENT
Start: 2023-10-03

## 2023-10-10 ENCOUNTER — OFFICE VISIT (OUTPATIENT)
Age: 78
End: 2023-10-10
Payer: MEDICARE

## 2023-10-10 VITALS
SYSTOLIC BLOOD PRESSURE: 119 MMHG | WEIGHT: 206 LBS | DIASTOLIC BLOOD PRESSURE: 70 MMHG | BODY MASS INDEX: 26.44 KG/M2 | HEART RATE: 64 BPM | HEIGHT: 74 IN

## 2023-10-10 DIAGNOSIS — I48.91 ATRIAL FIBRILLATION, UNSPECIFIED TYPE (HCC): Primary | ICD-10-CM

## 2023-10-10 DIAGNOSIS — E78.5 DYSLIPIDEMIA: ICD-10-CM

## 2023-10-10 DIAGNOSIS — E78.5 HYPERLIPIDEMIA, UNSPECIFIED HYPERLIPIDEMIA TYPE: ICD-10-CM

## 2023-10-10 DIAGNOSIS — Z79.01 ANTICOAGULATION ADEQUATE: ICD-10-CM

## 2023-10-10 DIAGNOSIS — Z95.0 PRESENCE OF CARDIAC PACEMAKER: ICD-10-CM

## 2023-10-10 PROCEDURE — G8484 FLU IMMUNIZE NO ADMIN: HCPCS | Performed by: SPECIALIST

## 2023-10-10 PROCEDURE — 99214 OFFICE O/P EST MOD 30 MIN: CPT | Performed by: SPECIALIST

## 2023-10-10 PROCEDURE — 1123F ACP DISCUSS/DSCN MKR DOCD: CPT | Performed by: SPECIALIST

## 2023-10-10 PROCEDURE — 1036F TOBACCO NON-USER: CPT | Performed by: SPECIALIST

## 2023-10-10 PROCEDURE — G8419 CALC BMI OUT NRM PARAM NOF/U: HCPCS | Performed by: SPECIALIST

## 2023-10-10 PROCEDURE — G8427 DOCREV CUR MEDS BY ELIG CLIN: HCPCS | Performed by: SPECIALIST

## 2023-10-10 NOTE — PATIENT INSTRUCTIONS

## 2023-10-10 NOTE — PROGRESS NOTES
from Last 3 Encounters:   09/19/23 125/71   07/28/23 108/64   04/06/23 (!) 140/70     Pulse Readings from Last 3 Encounters:   09/19/23 60   07/28/23 68   04/06/23 72         SPECIALTY COMMENTS  1. Stress Test   ETT April 2012 - 8 min, resting HR 81, peak    Exe Cardiolite 2/5/20 - 9:01. Normal perfusion. 2. Carotid duplex    8/17/2012 - 10-49% bilateral   4/15/14 - 10-49% plaque bilateral, unchanged   6/8/15: 10-49% bilateral stenosis; unchanged. 6/24/16 - 10-49% bilaterally, unchanged   4/18/17 - 10-49% bilaterally, unchanged. 7/18/19 - 10-49% bilateral, unchanged from 4/18/17 12/23/22- <50% mallory      3. Echo   3/4/13: EF 55-60%; LA: moderately dilated; MV and TV: mild regurg.; no sig. hange from previous echo   GORGE 4/17/2014 - no intracardiac mass/thrombus or shunt, mild to moderate aortic arch plaque, EF 50%, moderate LAE, mild MR/AR.   12/21/15 - EF 65 %. No WMA. Moderate BRYN. Mild TR. Mild pulmonary HTN with PASP 38mmHg. 2/5/20 - EF 60-65%. No WMA. Mod LAE. Mild dilated RA. Mild AoV sclerosis without AS. Mild AR. Mild TR. No Pulm HTN. Trace MR.   1/27/22- EF 60-65%, mod LVH, BRYN, AV tricuspid, mild AI, AV cusps appear mildly sclerotic, mild/mod TR, PAP 30 mmHg   1/26/23- EF 55-60%, LAE, mild MR/AI    4. 3/28/17- SJ pacemaker generator change per Dr. Jason Donovan      5.  Lipids   3/4/22- , HDL 56, LDL 73.6, TG 87   1/17/23- , HDL 55, LDL 75, LDL-P 820, TG 65  9/13/23- , HDL 55, LDL 63, TG 60
for this visit. I have reviewed the nurses notes, vitals, problem list, allergy list, medical history, family, social history and medications. REVIEW OF SYMPTOMS    As per HPI  General: Pt denies excessive weight gain or loss. Pt is able to conduct ADL's  HEENT: Denies blurred vision, headaches, hearing loss, epistaxis and difficulty swallowing. Respiratory: Denies cough, congestion, shortness of breath, RANDALL, wheezing or stridor. Cardiovascular: Denies precordial pain, palpitations, edema or PND  Gastrointestinal: Denies poor appetite, indigestion, abdominal pain or blood in stool  Genitourinary: Denies hematuria, dysuria, increased urinary frequency  Musculoskeletal: Denies joint pain or swelling from muscles or joints  Neurologic: Denies tremor, paresthesias, headache, or sensory motor disturbance  Psychiatric: Denies confusion, insomnia, depression  Integumentray: Denies rash, itching or ulcers. Hematologic: Denies easy bruising, bleeding     PHYSICAL EXAMINATION      Vitals:    10/10/23 1310   BP: 119/70   Pulse: 64   Weight: 206 lb (93.4 kg)   Height: 6' 2\" (1.88 m)     General: Well developed, in no acute distress. HEENT: No jaundice  Neck: Supple, no stiffness  Heart: Regular rate and rhythm  Respiratory: Clear bilaterally x 4, no wheezing or rales  Extremities:  No edema, normal cap refill, no cyanosis. Musculoskeletal: No clubbing, no deformities  Neuro: A&Ox3, speech clear, gait stable, cooperative, no focal neurologic deficits  Skin: Skin color is normal. No rashes or lesions. Non diaphoretic, moist.         DIAGNOSTIC DATA     1. Stress Test   ETT April 2012 - 8 min, resting HR 81, peak    Exe Cardiolite 2/5/20 - 9:01. Normal perfusion. 2. Carotid duplex    8/17/2012 - 10-49% bilateral   4/15/14 - 10-49% plaque bilateral, unchanged   6/8/15: 10-49% bilateral stenosis; unchanged. 6/24/16 - 10-49% bilaterally, unchanged   4/18/17 - 10-49% bilaterally, unchanged.    7/18/19 -

## 2023-10-13 ENCOUNTER — TELEPHONE (OUTPATIENT)
Age: 78
End: 2023-10-13

## 2023-10-13 DIAGNOSIS — I48.91 UNSPECIFIED ATRIAL FIBRILLATION (HCC): ICD-10-CM

## 2023-10-13 DIAGNOSIS — Z95.0 PRESENCE OF CARDIAC PACEMAKER: ICD-10-CM

## 2023-10-13 DIAGNOSIS — I49.5 BRADY-TACHY SYNDROME (HCC): ICD-10-CM

## 2023-10-13 DIAGNOSIS — I48.0 PAROXYSMAL ATRIAL FIBRILLATION (HCC): ICD-10-CM

## 2023-10-13 DIAGNOSIS — I48.91 ATRIAL FIBRILLATION, UNSPECIFIED TYPE (HCC): Primary | ICD-10-CM

## 2023-10-13 NOTE — TELEPHONE ENCOUNTER
Pt called When he went to get his lab work done for his PCP  they used Dr. Crow Waite order also pt will need new orders for Dr. Crow Waite

## 2023-10-13 NOTE — TELEPHONE ENCOUNTER
Pre procedure lab orders placed.     Orders Placed This Encounter   Procedures    CBC     Standing Status:   Future     Standing Expiration Date:   27/53/4018    Basic Metabolic Panel     Standing Status:   Future     Standing Expiration Date:   10/13/2024

## 2023-10-21 ENCOUNTER — PATIENT MESSAGE (OUTPATIENT)
Age: 78
End: 2023-10-21

## 2023-10-24 ENCOUNTER — ANESTHESIA EVENT (OUTPATIENT)
Facility: HOSPITAL | Age: 78
DRG: 274 | End: 2023-10-24
Payer: MEDICARE

## 2023-10-24 NOTE — H&P
Office visit from 23 reviewed. No interim changes. -------------------------------------------  Cardiac Electrophysiology Office Follow-up     NAME:            Elsa Davis           :               1945  MRM:              [id number]     Date:               2023         General Cardiologist: Dr. Kaitlynn Pérez and Plan:      1. Paroxysmal atrial fibrillation (HCC)  -     EKG 12 Lead  2. Aron-tachy syndrome (HCC)  3. Cardiac pacemaker in situ  4. Dyslipidemia  5. Thrombocytopenia ([address])  6. Anticoagulation adequate        Chronic atrial fibrillation  Ongoing atrial fibrillation not rapid. Predominantly pacer dependent ventricular pacing 94% of the time. Has had multiple prior history of CVA most recently occurring in December of last year. Has previously been on Pradaxa now transition to Eliquis. Also previously on aspirin now on Plavix. Given recurrent CVA despite anticoagulation and long-term risks of bleeding, he is interested in proceeding with watchman implantation  - The patient has non-valvular AF with a CHADS2 score > 2 or [id number] > 3. The patient is appropriate for short-term treatment with anticoagulation but unsuitable for long-term anticoagulation. We had a shared-decision making discussion about risk of thrombosis from AF and risk of bleeding from anticoagulation using a decision-making tool to calculate their risk and discussed appropriate rationale for Watchman left atrial appendage closure as an alternative. www.healthdecision. org/tool.html#/tool/afib. Patient reports full understanding of discussions and recommendations and wish to proceed with the procedure. - Obtain CT scan per watchman protocol  - Schedule for watchman implantation  - Obtain blood work prior to procedure     Bradycardia  Continue ventricular pacing 94% on interrogation today  - Normal pacemaker interrogation     Pacemaker  DOI 3/28/17, SJM Single chamber pacemaker with normal function.

## 2023-10-25 ENCOUNTER — HOSPITAL ENCOUNTER (INPATIENT)
Facility: HOSPITAL | Age: 78
LOS: 1 days | Discharge: HOME | DRG: 274 | End: 2023-10-25
Attending: INTERNAL MEDICINE | Admitting: INTERNAL MEDICINE
Payer: MEDICARE

## 2023-10-25 ENCOUNTER — APPOINTMENT (OUTPATIENT)
Facility: HOSPITAL | Age: 78
DRG: 274 | End: 2023-10-25
Attending: INTERNAL MEDICINE
Payer: MEDICARE

## 2023-10-25 ENCOUNTER — ANESTHESIA (OUTPATIENT)
Facility: HOSPITAL | Age: 78
DRG: 274 | End: 2023-10-25
Payer: MEDICARE

## 2023-10-25 VITALS
OXYGEN SATURATION: 96 % | RESPIRATION RATE: 16 BRPM | BODY MASS INDEX: 26.31 KG/M2 | SYSTOLIC BLOOD PRESSURE: 115 MMHG | HEIGHT: 74 IN | DIASTOLIC BLOOD PRESSURE: 69 MMHG | WEIGHT: 205 LBS | TEMPERATURE: 98.3 F | HEART RATE: 60 BPM

## 2023-10-25 DIAGNOSIS — I48.91 ATRIAL FIBRILLATION, UNSPECIFIED TYPE (HCC): ICD-10-CM

## 2023-10-25 PROBLEM — Z95.818 PRESENCE OF WATCHMAN LEFT ATRIAL APPENDAGE CLOSURE DEVICE: Status: ACTIVE | Noted: 2023-10-25

## 2023-10-25 LAB
ABO + RH BLD: NORMAL
ACT BLD: 287 SECS (ref 79–138)
BLOOD GROUP ANTIBODIES SERPL: NORMAL
ECHO BSA: 2.2 M2
SPECIMEN EXP DATE BLD: NORMAL

## 2023-10-25 PROCEDURE — C1889 IMPLANT/INSERT DEVICE, NOC: HCPCS | Performed by: INTERNAL MEDICINE

## 2023-10-25 PROCEDURE — 93662 INTRACARDIAC ECG (ICE): CPT | Performed by: INTERNAL MEDICINE

## 2023-10-25 PROCEDURE — 2709999900 HC NON-CHARGEABLE SUPPLY: Performed by: INTERNAL MEDICINE

## 2023-10-25 PROCEDURE — 2500000003 HC RX 250 WO HCPCS: Performed by: NURSE ANESTHETIST, CERTIFIED REGISTERED

## 2023-10-25 PROCEDURE — 1100000003 HC PRIVATE W/ TELEMETRY

## 2023-10-25 PROCEDURE — C1759 CATH, INTRA ECHOCARDIOGRAPHY: HCPCS | Performed by: INTERNAL MEDICINE

## 2023-10-25 PROCEDURE — 86850 RBC ANTIBODY SCREEN: CPT

## 2023-10-25 PROCEDURE — 86900 BLOOD TYPING SEROLOGIC ABO: CPT

## 2023-10-25 PROCEDURE — C1769 GUIDE WIRE: HCPCS | Performed by: INTERNAL MEDICINE

## 2023-10-25 PROCEDURE — 76937 US GUIDE VASCULAR ACCESS: CPT | Performed by: INTERNAL MEDICINE

## 2023-10-25 PROCEDURE — 2580000003 HC RX 258: Performed by: NURSE ANESTHETIST, CERTIFIED REGISTERED

## 2023-10-25 PROCEDURE — 02L73DK OCCLUSION OF LEFT ATRIAL APPENDAGE WITH INTRALUMINAL DEVICE, PERCUTANEOUS APPROACH: ICD-10-PCS | Performed by: INTERNAL MEDICINE

## 2023-10-25 PROCEDURE — 86901 BLOOD TYPING SEROLOGIC RH(D): CPT

## 2023-10-25 PROCEDURE — 71045 X-RAY EXAM CHEST 1 VIEW: CPT

## 2023-10-25 PROCEDURE — 36415 COLL VENOUS BLD VENIPUNCTURE: CPT

## 2023-10-25 PROCEDURE — 6360000004 HC RX CONTRAST MEDICATION: Performed by: INTERNAL MEDICINE

## 2023-10-25 PROCEDURE — 3700000001 HC ADD 15 MINUTES (ANESTHESIA): Performed by: INTERNAL MEDICINE

## 2023-10-25 PROCEDURE — 85347 COAGULATION TIME ACTIVATED: CPT

## 2023-10-25 PROCEDURE — C1894 INTRO/SHEATH, NON-LASER: HCPCS | Performed by: INTERNAL MEDICINE

## 2023-10-25 PROCEDURE — 6360000002 HC RX W HCPCS: Performed by: NURSE ANESTHETIST, CERTIFIED REGISTERED

## 2023-10-25 PROCEDURE — 3700000000 HC ANESTHESIA ATTENDED CARE: Performed by: INTERNAL MEDICINE

## 2023-10-25 PROCEDURE — 33340 PERQ CLSR TCAT L ATR APNDGE: CPT | Performed by: INTERNAL MEDICINE

## 2023-10-25 DEVICE — LEFT ATRIAL APPENDAGE CLOSURE DEVICE WITH DELIVERY SYSTEM
Type: IMPLANTABLE DEVICE | Status: FUNCTIONAL
Brand: WATCHMAN FLX™

## 2023-10-25 RX ORDER — SODIUM CHLORIDE 0.9 % (FLUSH) 0.9 %
5-40 SYRINGE (ML) INJECTION PRN
Status: DISCONTINUED | OUTPATIENT
Start: 2023-10-25 | End: 2023-10-25 | Stop reason: HOSPADM

## 2023-10-25 RX ORDER — FENTANYL CITRATE 50 UG/ML
25 INJECTION, SOLUTION INTRAMUSCULAR; INTRAVENOUS EVERY 5 MIN PRN
Status: DISCONTINUED | OUTPATIENT
Start: 2023-10-25 | End: 2023-10-25 | Stop reason: HOSPADM

## 2023-10-25 RX ORDER — PROCHLORPERAZINE EDISYLATE 5 MG/ML
5 INJECTION INTRAMUSCULAR; INTRAVENOUS
Status: DISCONTINUED | OUTPATIENT
Start: 2023-10-25 | End: 2023-10-25 | Stop reason: HOSPADM

## 2023-10-25 RX ORDER — ONDANSETRON 2 MG/ML
INJECTION INTRAMUSCULAR; INTRAVENOUS PRN
Status: DISCONTINUED | OUTPATIENT
Start: 2023-10-25 | End: 2023-10-25 | Stop reason: SDUPTHER

## 2023-10-25 RX ORDER — SUCCINYLCHOLINE/SOD CL,ISO/PF 200MG/10ML
SYRINGE (ML) INTRAVENOUS PRN
Status: DISCONTINUED | OUTPATIENT
Start: 2023-10-25 | End: 2023-10-25 | Stop reason: SDUPTHER

## 2023-10-25 RX ORDER — HYDRALAZINE HYDROCHLORIDE 20 MG/ML
10 INJECTION INTRAMUSCULAR; INTRAVENOUS
Status: DISCONTINUED | OUTPATIENT
Start: 2023-10-25 | End: 2023-10-25 | Stop reason: HOSPADM

## 2023-10-25 RX ORDER — SODIUM CHLORIDE, SODIUM LACTATE, POTASSIUM CHLORIDE, CALCIUM CHLORIDE 600; 310; 30; 20 MG/100ML; MG/100ML; MG/100ML; MG/100ML
INJECTION, SOLUTION INTRAVENOUS CONTINUOUS PRN
Status: DISCONTINUED | OUTPATIENT
Start: 2023-10-25 | End: 2023-10-25 | Stop reason: SDUPTHER

## 2023-10-25 RX ORDER — MIDAZOLAM HYDROCHLORIDE 2 MG/2ML
2 INJECTION, SOLUTION INTRAMUSCULAR; INTRAVENOUS
Status: DISCONTINUED | OUTPATIENT
Start: 2023-10-25 | End: 2023-10-25 | Stop reason: HOSPADM

## 2023-10-25 RX ORDER — OXYCODONE HYDROCHLORIDE 5 MG/1
10 TABLET ORAL EVERY 4 HOURS PRN
Status: DISCONTINUED | OUTPATIENT
Start: 2023-10-25 | End: 2023-10-25 | Stop reason: HOSPADM

## 2023-10-25 RX ORDER — FENTANYL CITRATE 50 UG/ML
100 INJECTION, SOLUTION INTRAMUSCULAR; INTRAVENOUS
Status: DISCONTINUED | OUTPATIENT
Start: 2023-10-25 | End: 2023-10-25 | Stop reason: HOSPADM

## 2023-10-25 RX ORDER — FENTANYL CITRATE 50 UG/ML
INJECTION, SOLUTION INTRAMUSCULAR; INTRAVENOUS PRN
Status: DISCONTINUED | OUTPATIENT
Start: 2023-10-25 | End: 2023-10-25 | Stop reason: SDUPTHER

## 2023-10-25 RX ORDER — SODIUM CHLORIDE, SODIUM LACTATE, POTASSIUM CHLORIDE, CALCIUM CHLORIDE 600; 310; 30; 20 MG/100ML; MG/100ML; MG/100ML; MG/100ML
INJECTION, SOLUTION INTRAVENOUS CONTINUOUS
Status: DISCONTINUED | OUTPATIENT
Start: 2023-10-25 | End: 2023-10-25 | Stop reason: HOSPADM

## 2023-10-25 RX ORDER — SODIUM CHLORIDE 0.9 % (FLUSH) 0.9 %
5-40 SYRINGE (ML) INJECTION EVERY 12 HOURS SCHEDULED
Status: DISCONTINUED | OUTPATIENT
Start: 2023-10-25 | End: 2023-10-25 | Stop reason: HOSPADM

## 2023-10-25 RX ORDER — HEPARIN SODIUM 1000 [USP'U]/ML
INJECTION, SOLUTION INTRAVENOUS; SUBCUTANEOUS PRN
Status: DISCONTINUED | OUTPATIENT
Start: 2023-10-25 | End: 2023-10-25 | Stop reason: SDUPTHER

## 2023-10-25 RX ORDER — SODIUM CHLORIDE 9 MG/ML
INJECTION, SOLUTION INTRAVENOUS PRN
Status: DISCONTINUED | OUTPATIENT
Start: 2023-10-25 | End: 2023-10-25 | Stop reason: HOSPADM

## 2023-10-25 RX ORDER — CEFAZOLIN SODIUM 1 G/3ML
INJECTION, POWDER, FOR SOLUTION INTRAMUSCULAR; INTRAVENOUS PRN
Status: DISCONTINUED | OUTPATIENT
Start: 2023-10-25 | End: 2023-10-25 | Stop reason: SDUPTHER

## 2023-10-25 RX ORDER — ROCURONIUM BROMIDE 10 MG/ML
INJECTION, SOLUTION INTRAVENOUS PRN
Status: DISCONTINUED | OUTPATIENT
Start: 2023-10-25 | End: 2023-10-25 | Stop reason: SDUPTHER

## 2023-10-25 RX ORDER — OXYCODONE HYDROCHLORIDE 5 MG/1
5 TABLET ORAL
Status: DISCONTINUED | OUTPATIENT
Start: 2023-10-25 | End: 2023-10-25 | Stop reason: HOSPADM

## 2023-10-25 RX ORDER — LIDOCAINE HYDROCHLORIDE 20 MG/ML
INJECTION, SOLUTION EPIDURAL; INFILTRATION; INTRACAUDAL; PERINEURAL PRN
Status: DISCONTINUED | OUTPATIENT
Start: 2023-10-25 | End: 2023-10-25 | Stop reason: SDUPTHER

## 2023-10-25 RX ORDER — PROTAMINE SULFATE 10 MG/ML
INJECTION, SOLUTION INTRAVENOUS PRN
Status: DISCONTINUED | OUTPATIENT
Start: 2023-10-25 | End: 2023-10-25 | Stop reason: SDUPTHER

## 2023-10-25 RX ORDER — LIDOCAINE HYDROCHLORIDE 10 MG/ML
1 INJECTION, SOLUTION EPIDURAL; INFILTRATION; INTRACAUDAL; PERINEURAL
Status: DISCONTINUED | OUTPATIENT
Start: 2023-10-25 | End: 2023-10-25 | Stop reason: HOSPADM

## 2023-10-25 RX ORDER — OXYCODONE HYDROCHLORIDE 5 MG/1
5 TABLET ORAL EVERY 4 HOURS PRN
Status: DISCONTINUED | OUTPATIENT
Start: 2023-10-25 | End: 2023-10-25 | Stop reason: HOSPADM

## 2023-10-25 RX ORDER — HYDROMORPHONE HYDROCHLORIDE 1 MG/ML
0.5 INJECTION, SOLUTION INTRAMUSCULAR; INTRAVENOUS; SUBCUTANEOUS EVERY 5 MIN PRN
Status: DISCONTINUED | OUTPATIENT
Start: 2023-10-25 | End: 2023-10-25 | Stop reason: HOSPADM

## 2023-10-25 RX ORDER — PHENYLEPHRINE HCL IN 0.9% NACL 0.4MG/10ML
SYRINGE (ML) INTRAVENOUS PRN
Status: DISCONTINUED | OUTPATIENT
Start: 2023-10-25 | End: 2023-10-25 | Stop reason: SDUPTHER

## 2023-10-25 RX ORDER — ONDANSETRON 2 MG/ML
4 INJECTION INTRAMUSCULAR; INTRAVENOUS
Status: DISCONTINUED | OUTPATIENT
Start: 2023-10-25 | End: 2023-10-25 | Stop reason: HOSPADM

## 2023-10-25 RX ORDER — ACETAMINOPHEN 325 MG/1
650 TABLET ORAL EVERY 4 HOURS PRN
Status: DISCONTINUED | OUTPATIENT
Start: 2023-10-25 | End: 2023-10-25 | Stop reason: HOSPADM

## 2023-10-25 RX ORDER — ONDANSETRON 2 MG/ML
4 INJECTION INTRAMUSCULAR; INTRAVENOUS EVERY 6 HOURS PRN
Status: DISCONTINUED | OUTPATIENT
Start: 2023-10-25 | End: 2023-10-25 | Stop reason: HOSPADM

## 2023-10-25 RX ORDER — ACETAMINOPHEN 500 MG
1000 TABLET ORAL ONCE
Status: DISCONTINUED | OUTPATIENT
Start: 2023-10-25 | End: 2023-10-25 | Stop reason: HOSPADM

## 2023-10-25 RX ADMIN — PHENYLEPHRINE HYDROCHLORIDE 40 MCG/MIN: 10 INJECTION INTRAVENOUS at 07:40

## 2023-10-25 RX ADMIN — CEFAZOLIN 2 G: 330 INJECTION, POWDER, FOR SOLUTION INTRAMUSCULAR; INTRAVENOUS at 07:43

## 2023-10-25 RX ADMIN — Medication 80 MCG: at 07:55

## 2023-10-25 RX ADMIN — HEPARIN SODIUM 1000 UNITS: 1000 INJECTION, SOLUTION INTRAVENOUS; SUBCUTANEOUS at 08:06

## 2023-10-25 RX ADMIN — HEPARIN SODIUM 10000 UNITS: 1000 INJECTION, SOLUTION INTRAVENOUS; SUBCUTANEOUS at 07:47

## 2023-10-25 RX ADMIN — PROTAMINE SULFATE 40 MG: 10 INJECTION, SOLUTION INTRAVENOUS at 08:16

## 2023-10-25 RX ADMIN — PROPOFOL 150 MG: 10 INJECTION, EMULSION INTRAVENOUS at 07:28

## 2023-10-25 RX ADMIN — LIDOCAINE HYDROCHLORIDE 80 MG: 20 INJECTION, SOLUTION EPIDURAL; INFILTRATION; INTRACAUDAL; PERINEURAL at 07:27

## 2023-10-25 RX ADMIN — SODIUM CHLORIDE, POTASSIUM CHLORIDE, SODIUM LACTATE AND CALCIUM CHLORIDE: 600; 310; 30; 20 INJECTION, SOLUTION INTRAVENOUS at 07:22

## 2023-10-25 RX ADMIN — SUGAMMADEX 200 MG: 100 INJECTION, SOLUTION INTRAVENOUS at 08:19

## 2023-10-25 RX ADMIN — Medication 180 MG: at 07:29

## 2023-10-25 RX ADMIN — SODIUM CHLORIDE, POTASSIUM CHLORIDE, SODIUM LACTATE AND CALCIUM CHLORIDE: 600; 310; 30; 20 INJECTION, SOLUTION INTRAVENOUS at 08:35

## 2023-10-25 RX ADMIN — ROCURONIUM BROMIDE 20 MG: 10 SOLUTION INTRAVENOUS at 07:45

## 2023-10-25 RX ADMIN — FENTANYL CITRATE 50 MCG: 50 INJECTION, SOLUTION INTRAMUSCULAR; INTRAVENOUS at 07:27

## 2023-10-25 RX ADMIN — ONDANSETRON HYDROCHLORIDE 4 MG: 2 INJECTION, SOLUTION INTRAMUSCULAR; INTRAVENOUS at 08:20

## 2023-10-25 NOTE — PROCEDURES
PERCUTANEOUS LEFT ATRIAL APPENDAGE OCCLUSION (WATCHMAN FLX) PROCEDURE    DATE OF PROCEDURE: 10/25/23  ATTENDING PHYSICIAN: Sanjuana Bolaños MD, [facility] [place], [place]    INDICATIONS:  77 yo gentleman with a history of SSS s/p PPM, HLD, history of multiple CVAs, chronic AF on Eliquis and plavix now referred for percutaneous left atrial appendage occlusion procedure. ANESTHESIA:  General anesthesia with endotracheal intubation was performed for the entire duration of the procedure with monitoring with transesophageal echocardiogram.    ESTIMATED BLOOD LOSS:  30 cc    COMPLICATIONS:  None    SPECIMENS:  None    FINDINGS:  After informed written consent, the patient was brought to the EP lab in the fasting, nonsedated state. Preoperative transesophageal echocardiogram revealed no left atrial appendage thrombus and no evidence of pericardial effusion. Pre-procedure measurements of the left atrial appendage was performed. The patient was prepped and draped in the usual sterile fashion. Ultrasound evaluation of possible access sites. Patency of the selected vessel. Realtime visualization of the vascular needle entry was performed. Left femoral venous access was obtained using modified Seldinger technique for placement of a 11 [place] venous sheath followed by a inContact intracardiac ultrasound. Intracardiac Ultrasound was used to assess baseline anatomy, facilitate transeptal puncture, and confirmation of AGGIE location during placement. Right femoral venous access was obtained using modified Seldinger technique with an 11 Fr short sheath. Transseptal puncture was then performed using an 12 [place] dilator with a Watchman sheath using the [employer] system under fluoroscopic and intracardiac ultrasound guidance. The [employer] 911man access system (14 [place], double curve, [employer]) was used.   A 6 [place] pigtail catheter was then advanced through the [employer] access system and advanced into the left atrial

## 2023-10-25 NOTE — PROGRESS NOTES
EP/Cath LAB to Recovery Room Report    Procedure: Afia WEAVER: JUAN C Moses MD    Verbal Report given to Recovery Nurse on patient being transferred to Recovery Room for routine post-op. Patient stable upon transfer to . Pt had general sedation with Anesthesia team, who managed MAR, vitals, and airway. Vitals, mental status, MAR, procedural summary discussed with recovery RN. Right femoral vein 15 fr sheath removed, closed at 0816 with manual compression. .  Left femoral vein 11 fr sheath removed, closed at 0821 with manual compression.

## 2023-10-25 NOTE — ANESTHESIA PRE PROCEDURE
Department of Anesthesiology  Preprocedure Note       Name:  Yara Downing   Age:  78 y.o.  :  1945                                          MRN:  [id number]         Date:  10/25/2023      Surgeon: Siddhartha Haskins):  Sanjuana Wells MD    Procedure: Procedure(s): Watchman paolo closure device    Medications prior to admission:   Prior to Admission medications    Medication Sig Start Date End Date Taking?  Authorizing Provider   Misc Natural Products (GLUCOSAMINE CHOND CMP ADVANCED PO) Take 1 tablet by mouth   Yes Maximus Flores MD   dicyclomine (BENTYL) 10 MG capsule Take 1 capsule by mouth 2 times daily 23   Jolanta Styles MD   fluticasone Lawrence Drone) 50 MCG/ACT nasal spray 2 sprays by Nasal route daily 23   Maximus Flores MD   Cyanocobalamin (VITAMIN B 12) 500 MCG TABS Take 2,000 mg by mouth daily lozenges    Maximus Flores MD   Probiotic Product (PROBIOTIC-10 PO) Take 1 capsule by mouth daily    Maximus Flores MD   apixaban (ELIQUIS) 5 MG TABS tablet Take 1 tablet by mouth 2 times daily 23   Hannah Whipple MD   atorvastatin (LIPITOR) 40 MG tablet Take 1 tablet by mouth every evening 23   Hannah Whipple MD   clopidogrel (PLAVIX) 75 MG tablet Take 1 tablet by mouth daily 23   Hannah Whipple MD   lisinopril (PRINIVIL;ZESTRIL) 2.5 MG tablet Take 1 tablet by mouth daily 23   Walt Hernández MD   KRILL OIL PO Take 1 capsule by mouth daily    Automatic Reconciliation, Ar   acetaminophen (TYLENOL) 500 MG tablet Take by mouth 3 times daily as needed    Automatic Reconciliation, Ar   vitamin D (CHOLECALCIFEROL) 25 MCG (1000 UT) TABS tablet Take 2 tablets by mouth every evening    Automatic Reconciliation, Ar   diclofenac sodium (VOLTAREN) 1 % GEL as needed 17   Automatic Reconciliation, Ar   loratadine (CLARITIN REDITABS) 10 MG dissolvable tablet Take by mouth daily    Automatic Reconciliation, Ar   Melatonin 5 MG CAPS Take by mouth nightly as needed 16

## 2023-10-25 NOTE — ANESTHESIA PROCEDURE NOTES
Procedure Performed: GORGE       Start Time:  10/25/2023 8:21 AM       End Time:      Preanesthesia Checklist:  Patient identified, IV assessed, risks and benefits discussed, monitors and equipment assessed, procedure being performed at surgeon's request and anesthesia consent obtained. General Procedure Information  Diagnostic Indications for Echo:  assessment of ascending aorta, assessment of surgical repair, hemodynamic monitoring and assessment of valve function  Location performed:  OR  Intubated  Bite block placed  Heart visualized  Probe Insertion:  Easy  Probe Type:  3D, mulitplane, epiaortic and biplane  Modalities:  2D, 3D, color flow mapping, continuous wave Doppler, pulse wave Doppler and M-mode    Echocardiographic and Doppler Measurements    Ventricles    Right Ventricle:  Cavity size normal.  Hypertrophy not present. Thrombus not present. Global function normal.    Left Ventricle:  Cavity size normal.  Hypertrophy not present. Thrombus not present. Global Function normal.    Other Ventricular Findings:       LVEF >55%    Ventricular Regional Function:  1- Basal Anteroseptal:  normal  2- Basal Anterior:  normal  3- Basal Anterolateral:  normal  4- Basal Inferolateral:  normal  5- Basal Inferior:  normal  6- Basal Inferoseptal:  normal  7- Mid Anteroseptal:  normal  8- Mid Anterior:  normal  9- Mid Anterolateral:  normal  10- Mid Inferolateral:  normal  11- Mid Inferior:  normal  12- Mid Inferoseptal:  normal  13- Apical Anterior:  normal  14- Apical Lateral:  normal  15- Apical Inferior:  normal  16- Apical Septal:  normal  17- Mcdonough:  normal    Wall Motion Comments:       No WMA    Valves    Aortic Valve: Annulus normal.  Stenosis not present. Regurgitation mild. Leaflet motions normal.      Mitral Valve: Annulus normal.  Stenosis not present. Regurgitation mild. Leaflets thickened. Leaflet motions normal.      Tricuspid Valve: Annulus normal.  Stenosis not present. Regurgitation none.

## 2023-10-25 NOTE — DISCHARGE SUMMARY
pacer dependent (RV pacing 94%). -See above, now s/p Watchman due to recurrent CVAs on [address] [place] single lead pacemaker (DOI 03/28/2017):  -Implanted for bradycardia. -Device check on 07/28/2023 showed proper lead & generator function, 4.7-5.2 years generator longevity. RV 94%. Follow up as noted below.     Future Appointments   Date Time Provider [address]   11/7/2023  1:40 PM MERCEDEZ Parisi NP BS AMB   3/19/2024  1:30 PM Jolanta Styles MD [facility] BS AMB   4/10/2024  1:40 PM Walt Hernández MD [facility] BS AMB   7/30/2024 10:20 AM PACEMAKERWALLY BS AMB   7/30/2024 10:40 AM MERCEDEZ Parisi NP [facility] BS AMB             Signed:  MERCEDEZ Werner NP  10/25/2023  7:30 AM

## 2023-10-25 NOTE — PROGRESS NOTES
Cardiac Cath Lab Recovery Arrival Note:      Taj Randall arrived to Cardiac Cath Lab, Recovery Area. Staff introduced to patient. Patient identifiers verified with NAME and DATE OF BIRTH. Procedure verified with patient. Consent forms reviewed and signed by patient or authorized representative and verified. Allergies verified. Patient and family oriented to department. Patient and family informed of procedure and plan of care. Questions answered with review. Patient prepped for procedure, per orders from physician, prior to arrival.    Patient on cardiac monitor, non-invasive blood pressure, SPO2 monitor. On room air. Patient is A&Ox 4. Patient reports no complaints. Patient in stretcher, in low position, with side rails up, call bell within reach, patient instructed to call if assistance as needed. Patient prep in: CCL fast track [address] [place] [address]. Patient family has pager # Micheline Parcel [phone]  Family in: waiting upstairs.

## 2023-10-25 NOTE — PROGRESS NOTES
Cardiac Cath Lab Procedure Area Arrival Note:    El Harris arrived to Cardiac Cath Lab, Procedure Area. Patient identifiers verified with NAME and DATE OF BIRTH. Procedure verified with patient. Consent forms verified. Allergies verified. Patient informed of procedure and plan of care. Questions answered with review. Patient voiced understanding of procedure and plan of care. Patient on cardiac monitor, non-invasive blood pressure, SPO2 monitor. On stretcher to room 3 for a GORGE and AGGIE closure device under general anesthesia. Patient status doing well without problems. Patient is A&Ox 4. Patient reports no co.     Patient medicated during procedure with orders obtained and verified by Dr. Danielle Owens. Refer to patients Cardiac Cath Lab PROCEDURE REPORT for vital signs, assessment, status, and response during procedure, printed at end of case. Printed report on chart or scanned into chart.

## 2023-10-25 NOTE — PROGRESS NOTES
1145 Ambulated patient to the bathroom with a steady gait, voided without difficulty. Patient denies chest pain, shortness of breath, or dizziness. Returned to stretcher. Vital signs stable. 1155 Patient developed hematoma post walking 1st time. Will try again at 9911 0053 Patient walked again this time no hematoma or bleeding. [phone]  Patient dressed self. 36 Educated patient about their sedation precautions such as not driving, operating any machinery, or signing legal documents 24 hours post procedure. Reviewed discharge instructions, including medications and site care using the teach back method. Answered all questions. Verbalized understanding. 1250 Removed peripheral IV. Escorted to discharge area in a wheelchair with all of their belongings. Patient's spouse present to take patient home. Reviewed discharge instructions with patients' spouse, they verbalized understanding.

## 2023-11-02 ENCOUNTER — TELEPHONE (OUTPATIENT)
Age: 78
End: 2023-11-02

## 2023-11-02 NOTE — TELEPHONE ENCOUNTER
Called patient back to inform him will have supervisor contact him for denial medication from messages sent to Nurse on Monday    Patient#[phone]

## 2023-11-03 ENCOUNTER — TELEPHONE (OUTPATIENT)
Age: 78
End: 2023-11-03

## 2023-11-03 RX ORDER — METOPROLOL SUCCINATE 50 MG/1
75 TABLET, EXTENDED RELEASE ORAL DAILY
Qty: 135 TABLET | Refills: 1 | Status: SHIPPED | OUTPATIENT
Start: 2023-11-03

## 2023-11-03 NOTE — TELEPHONE ENCOUNTER
Pt called for status. Advise to allow for the call back. He left a different number for contact.          Pt #  [phone]

## 2023-11-03 NOTE — TELEPHONE ENCOUNTER
[address] with patient to clarify confusion about Metorpolol order. States [facility] did not have correct dosage/timing of medication. [address] [facility] to verify prescription order/ask what was missing from original order. Per [facility] nobody contacted pt stating there was incomplete info for rx. Rx that was entered on 10/27 was for capsules, not tablets, thus creating discrepancy. New order placed for Metoprolol Succinate 50mg, dose 75mg, take 1.5 tabs daily, dispense 135. Refill 1.

## 2023-11-06 NOTE — PROGRESS NOTES
Cardiac Electrophysiology Office Follow-up    NAME:  Yaritza Horner   :  1945  MRM:  [id number]    Date:  2023     General Cardiologist: Dr. Amelie La and Plan:     1. Presence of Watchman left atrial appendage closure device  2. Anticoagulation adequate  3. Cardiac pacemaker in situ         Chronic atrial fibrillation  Ongoing atrial fibrillation not rapid. Predominantly pacer dependent ventricular pacing 94% of the time. Has had multiple prior history of CVA most recently occurring in December of last year. Has previously been on Pradaxa now transition to Eliquis. Also previously on aspirin now on Plavix. Given recurrent CVA despite anticoagulation and long-term risks of bleeding, he is interested in proceeding with watchman implantation  - S/p Watchman 10/25/23  - Continue Eliquis and Plavix  - GORGE 45 days post Watchman implant   - Follow up after Watchman     Bradycardia  - S/p pacemaker    Pacemaker  DOI 3/28/17, [facility] Single chamber pacemaker   - Remote transmission every 3 months    Anticoagulation  Multiple CVAs despite anticoagulation as noted above  - Continue Eliquis and Plavix                     Subjective:     Yaritza Horner, a 78y.o. year-old who presents for followup of Pacemaker and AF. Prior patient of Dr. Roxanna Mathew with history of dual-chamber pacemaker, tachycardia/bradycardia syndrome, GERD, peripheral vascular disease, dyslipidemia,  CVA, atrial fibrillation. He has had issues with bleeding on anticoagulation and is being referred for possible Watchman implantation. History of prior CVA in  with residual visual deficit. Had another CVA in  affecting his right eye. Previously on Pradaxa, now on Eliquis, ASA transition to Plavix. Aside from his visual disturbance denies of any chest pain or shortness of breath. S/p Watchman implant 10/25/23. He is doing well and denies chest pain, dyspnea, dizziness or syncope.  His mallory groin sites were initially

## 2023-11-07 ENCOUNTER — OFFICE VISIT (OUTPATIENT)
Age: 78
End: 2023-11-07
Payer: MEDICARE

## 2023-11-07 ENCOUNTER — TELEPHONE (OUTPATIENT)
Age: 78
End: 2023-11-07

## 2023-11-07 VITALS
HEART RATE: 64 BPM | DIASTOLIC BLOOD PRESSURE: 64 MMHG | BODY MASS INDEX: 27.08 KG/M2 | OXYGEN SATURATION: 94 % | WEIGHT: 211 LBS | RESPIRATION RATE: 14 BRPM | HEIGHT: 74 IN | SYSTOLIC BLOOD PRESSURE: 116 MMHG

## 2023-11-07 DIAGNOSIS — Z95.0 CARDIAC PACEMAKER IN SITU: ICD-10-CM

## 2023-11-07 DIAGNOSIS — Z79.01 ANTICOAGULATION ADEQUATE: ICD-10-CM

## 2023-11-07 DIAGNOSIS — Z95.818 PRESENCE OF WATCHMAN LEFT ATRIAL APPENDAGE CLOSURE DEVICE: Primary | ICD-10-CM

## 2023-11-07 PROCEDURE — 99214 OFFICE O/P EST MOD 30 MIN: CPT | Performed by: NURSE PRACTITIONER

## 2023-11-07 PROCEDURE — 1123F ACP DISCUSS/DSCN MKR DOCD: CPT | Performed by: NURSE PRACTITIONER

## 2023-11-07 NOTE — TELEPHONE ENCOUNTER
Spoke with Pt of GORGE giles for 12/15/23 at 10am arrive at 9am   Check in at the first floor Outpt reg. Desk.   Pt Needs a   Pt is to be NPO from midnight the night before.   Medications:   Ok to take   VO by /Dr. Moses/nurse Laure HILARIO

## 2023-11-30 DIAGNOSIS — Z95.818 PRESENCE OF WATCHMAN LEFT ATRIAL APPENDAGE CLOSURE DEVICE: Primary | ICD-10-CM

## 2023-11-30 RX ORDER — SODIUM CHLORIDE 0.9 % (FLUSH) 0.9 %
5-40 SYRINGE (ML) INJECTION EVERY 12 HOURS SCHEDULED
OUTPATIENT
Start: 2023-12-13

## 2023-11-30 RX ORDER — SODIUM CHLORIDE 0.9 % (FLUSH) 0.9 %
5-40 SYRINGE (ML) INJECTION PRN
OUTPATIENT
Start: 2023-12-13

## 2023-12-01 ENCOUNTER — OFFICE VISIT (OUTPATIENT)
Age: 78
End: 2023-12-01

## 2023-12-01 DIAGNOSIS — I77.9 BILATERAL CAROTID ARTERY DISEASE, UNSPECIFIED TYPE (HCC): ICD-10-CM

## 2023-12-01 DIAGNOSIS — Z95.0 CARDIAC PACEMAKER IN SITU: ICD-10-CM

## 2023-12-01 DIAGNOSIS — K21.9 GASTROESOPHAGEAL REFLUX DISEASE WITHOUT ESOPHAGITIS: ICD-10-CM

## 2023-12-01 DIAGNOSIS — Z95.818 PRESENCE OF WATCHMAN LEFT ATRIAL APPENDAGE CLOSURE DEVICE: ICD-10-CM

## 2023-12-01 DIAGNOSIS — I49.5 BRADY-TACHY SYNDROME (HCC): ICD-10-CM

## 2023-12-01 DIAGNOSIS — I48.21 PERMANENT ATRIAL FIBRILLATION (HCC): Primary | ICD-10-CM

## 2023-12-01 DIAGNOSIS — Z79.01 ANTICOAGULATION ADEQUATE: ICD-10-CM

## 2023-12-01 NOTE — PROGRESS NOTES
Patient presents for a groin wound check. Dr. Nan Kerns inspected the site. No ecchymosis, no redness, some focal tenderness. Nothing to be done.
PASP 38mmHg. 2/5/20 - EF 60-65%. No WMA. Mod LAE. Mild dilated RA. Mild AoV sclerosis without AS. Mild AR. Mild TR. No Pulm HTN. Trace MR.   1/27/22- EF 60-65%, mod LVH, BRYN, AV tricuspid, mild AI, AV cusps appear mildly sclerotic, mild/mod TR, PAP 30 mmHg   1/26/23- EF 55-60%, LAE, mild MR/AI    4. 3/28/17- SJM pacemaker generator change per Dr. May Petty      5. Lipids   3/4/22- , HDL 56, LDL 73.6, TG 87   1/17/23- , HDL 55, LDL 75, LDL-P 820, TG 65  9/13/23- , HDL 55, LDL 63, TG 60      Lab Review:     Lab Results   Component Value Date/Time    CHOL 130 09/13/2023 10:38 AM    HDL 55 09/13/2023 10:38 AM     No results found for: \"PRAKASH\", \"CREAPOC\", \"CREA\"  Lab Results   Component Value Date/Time    BUN 17 10/18/2023 10:14 AM     Lab Results   Component Value Date/Time    K 4.8 10/18/2023 10:14 AM     No results found for: \"HBA1C\"  Lab Results   Component Value Date/Time    HGB 15.6 10/18/2023 10:14 AM     Lab Results   Component Value Date/Time     10/18/2023 10:14 AM     No results for input(s): \"CPK\", \"CKMB\" in the last 72 hours. Invalid input(s): \"CKQMB\", \"CPKMB\", \"TROIQ\"             ___________________________________________________    An Apolonia Frost MD, [facility] [place], [address] [facility] [place]  [address] [place], [address]                              [phone]     [address] [place] [address].  [address] [facility] [place], [facility]  [phone]

## 2023-12-13 ENCOUNTER — HOSPITAL ENCOUNTER (OUTPATIENT)
Facility: HOSPITAL | Age: 78
Discharge: HOME | End: 2023-12-13
Attending: SPECIALIST
Payer: MEDICARE

## 2023-12-13 VITALS
WEIGHT: 211 LBS | OXYGEN SATURATION: 100 % | HEIGHT: 74 IN | SYSTOLIC BLOOD PRESSURE: 152 MMHG | RESPIRATION RATE: 17 BRPM | HEART RATE: 60 BPM | BODY MASS INDEX: 27.08 KG/M2 | DIASTOLIC BLOOD PRESSURE: 80 MMHG

## 2023-12-13 DIAGNOSIS — I48.91 A-FIB (HCC): ICD-10-CM

## 2023-12-13 LAB — ECHO BSA: 2.24 M2

## 2023-12-13 PROCEDURE — 93312 ECHO TRANSESOPHAGEAL: CPT

## 2023-12-13 PROCEDURE — 6360000002 HC RX W HCPCS: Performed by: SPECIALIST

## 2023-12-13 PROCEDURE — 6370000000 HC RX 637 (ALT 250 FOR IP): Performed by: SPECIALIST

## 2023-12-13 PROCEDURE — 99152 MOD SED SAME PHYS/QHP 5/>YRS: CPT

## 2023-12-13 RX ORDER — LIDOCAINE HYDROCHLORIDE 20 MG/ML
JELLY TOPICAL
Status: DISCONTINUED
Start: 2023-12-13 | End: 2023-12-13 | Stop reason: HOSPADM

## 2023-12-13 RX ORDER — LIDOCAINE 50 MG/G
OINTMENT TOPICAL
Status: DISCONTINUED
Start: 2023-12-13 | End: 2023-12-13 | Stop reason: HOSPADM

## 2023-12-13 RX ORDER — FENTANYL CITRATE 50 UG/ML
INJECTION, SOLUTION INTRAMUSCULAR; INTRAVENOUS PRN
Status: COMPLETED | OUTPATIENT
Start: 2023-12-13 | End: 2023-12-13

## 2023-12-13 RX ORDER — FENTANYL CITRATE 50 UG/ML
INJECTION, SOLUTION INTRAMUSCULAR; INTRAVENOUS
Status: DISCONTINUED
Start: 2023-12-13 | End: 2023-12-13 | Stop reason: HOSPADM

## 2023-12-13 RX ORDER — MIDAZOLAM HYDROCHLORIDE 1 MG/ML
INJECTION INTRAMUSCULAR; INTRAVENOUS PRN
Status: COMPLETED | OUTPATIENT
Start: 2023-12-13 | End: 2023-12-13

## 2023-12-13 RX ORDER — LIDOCAINE 50 MG/G
OINTMENT TOPICAL PRN
Status: COMPLETED | OUTPATIENT
Start: 2023-12-13 | End: 2023-12-13

## 2023-12-13 RX ORDER — LIDOCAINE HYDROCHLORIDE 20 MG/ML
JELLY TOPICAL PRN
Status: COMPLETED | OUTPATIENT
Start: 2023-12-13 | End: 2023-12-13

## 2023-12-13 RX ORDER — LIDOCAINE HYDROCHLORIDE 20 MG/ML
SOLUTION OROPHARYNGEAL
Status: DISCONTINUED
Start: 2023-12-13 | End: 2023-12-13 | Stop reason: HOSPADM

## 2023-12-13 RX ORDER — LIDOCAINE HYDROCHLORIDE 20 MG/ML
SOLUTION OROPHARYNGEAL PRN
Status: COMPLETED | OUTPATIENT
Start: 2023-12-13 | End: 2023-12-13

## 2023-12-13 RX ORDER — MIDAZOLAM HYDROCHLORIDE 1 MG/ML
INJECTION INTRAMUSCULAR; INTRAVENOUS
Status: DISCONTINUED
Start: 2023-12-13 | End: 2023-12-13 | Stop reason: HOSPADM

## 2023-12-13 RX ADMIN — FENTANYL CITRATE 25 MCG: 50 INJECTION, SOLUTION INTRAMUSCULAR; INTRAVENOUS at 10:21

## 2023-12-13 RX ADMIN — MIDAZOLAM HYDROCHLORIDE 3 MG: 1 INJECTION, SOLUTION INTRAMUSCULAR; INTRAVENOUS at 10:21

## 2023-12-13 RX ADMIN — LIDOCAINE 5 ML: 50 OINTMENT TOPICAL at 09:52

## 2023-12-13 RX ADMIN — LIDOCAINE HYDROCHLORIDE 5 ML: 20 JELLY TOPICAL at 10:25

## 2023-12-13 RX ADMIN — LIDOCAINE HYDROCHLORIDE 15 ML: 20 SOLUTION ORAL; TOPICAL at 09:46

## 2023-12-13 NOTE — PROCEDURES
She needed 3 D screening mammo    You did DX    I ordered it    It should be in your box to sign Yung Kline MD. [facility] [place]              Patient: Bert Garcia  : 1945      Today's Date: 2023      BRIEF GORGE PROCEDURE NOTE    Date of Procedure: 2023   Preoperative Diagnosis: s/p Watchman   Postoperative Diagnosis: stable Watchman without a leak   Procedure: Transesophageal Echo  Cardiologist: Sharath Amador MD  Anesthesia: local + IV sedation  Estimated Blood Loss: None  Specimens Removed: None  Assistants: None  Grafts, transplants, or devices implanted: None  Findings: stable Watchman without a leak   See full GORGE note.   Complications: none

## 2023-12-13 NOTE — PROGRESS NOTES
Patient arrived to Non-Invasive Cardiology Lab for Out Patient GORGE Procedure. Staff introduced to patient. Patient identifiers verified with Name and Date of Birth. Procedure verified with patient. Consent forms reviewed and signed by patient or authorized representative and verified. Allergies verified. Patient informed of procedure and plan of care. Questions answered with review. Patient on cardiac monitor, non-invasive blood pressure, SPO2 monitor. Patient is A&Ox3. Patient reports no complaints. Patient belongings and valuables to remain in the room with patient. Patient on stretcher, in low position, with side rails up and brakes locked. Patient instructed to call for assistance as needed. Family in waiting room.

## 2023-12-13 NOTE — H&P
Tawanna Hartmann MD. [facility] [place]          Patient: Terri Chan  : 1945      Today's Date: 2023        HISTORY OF PRESENT ILLNESS:     History of Present Illness:    Here for post-watchman GORGE      PAST MEDICAL HISTORY:     Past Medical History:   Diagnosis Date    AR (allergic rhinitis) 2009    Atrial fibrillation ([address])  to present    BPH (benign prostatic hypertrophy)     Aron-tachy syndrome ([address]) 2012    CAD (coronary artery disease)     Colon polyp 2009    COVID-19 2022    Dyslipidemia 2014    GERD (gastroesophageal reflux disease) fall     OA (osteoarthritis) of knee 2009    Pacemaker     PSA elevation 2017    Skin moles 2009    Stroke ([address])     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    Thyroid nodule 2009       Past Surgical History:   Procedure Laterality Date    CARDIAC PROCEDURE N/A 10/25/2023    Intracardiac echocardiogram performed by Sanjuana Moses MD at [facility] CATH LAB    COLONOSCOPY      COLONOSCOPY N/A 10/21/2019    COLONOSCOPY- Check for device orders performed by Devi Sheth MD at [address] [place] [address]    ECHO 2D ADULT  8/15/11    EF 55%, biatrial enlargement, mild MR    EP DEVICE PROCEDURE N/A 10/25/2023    Watchman paolo closure device performed [facility] Sanjuana Mallory MD [facility]    EYE SURGERY      Cataract    HEENT  2008    nasal septoplasty    INVASIVE VASCULAR N/A 10/25/2023    Ultrasound guided vascular access performed by Jaylin Washington MD at [facility] CATH LAB    JOINT REPLACEMENT  2021    Knee    KNEE ARTHROSCOPY  2016    ORTHOPEDIC SURGERY  2016    Right Knee Scope with meniscus surgery    PACEMAKER  Aug 2009    Dr. Maria E Rea      pacemaker placement    REFRACTIVE SURGERY  2017    Right Eye laser treatment     STRESS TEST, LEXISCAN  2008    11 min., normal perfusion, EF 57%    TOTAL KNEE ARTHROPLASTY Right 2021    VAS CAROTID DUPLEX BILATERAL  8/15/11

## 2023-12-15 ENCOUNTER — OFFICE VISIT (OUTPATIENT)
Age: 78
End: 2023-12-15
Payer: MEDICARE

## 2023-12-15 VITALS
OXYGEN SATURATION: 98 % | SYSTOLIC BLOOD PRESSURE: 130 MMHG | BODY MASS INDEX: 28.13 KG/M2 | HEART RATE: 72 BPM | DIASTOLIC BLOOD PRESSURE: 82 MMHG | RESPIRATION RATE: 16 BRPM | WEIGHT: 219.2 LBS | HEIGHT: 74 IN

## 2023-12-15 DIAGNOSIS — Z79.01 ANTICOAGULATION ADEQUATE: ICD-10-CM

## 2023-12-15 DIAGNOSIS — I48.21 PERMANENT ATRIAL FIBRILLATION (HCC): ICD-10-CM

## 2023-12-15 DIAGNOSIS — Z95.818 PRESENCE OF WATCHMAN LEFT ATRIAL APPENDAGE CLOSURE DEVICE: Primary | ICD-10-CM

## 2023-12-15 DIAGNOSIS — Z95.0 CARDIAC PACEMAKER IN SITU: ICD-10-CM

## 2023-12-15 PROCEDURE — 99214 OFFICE O/P EST MOD 30 MIN: CPT | Performed by: NURSE PRACTITIONER

## 2023-12-15 RX ORDER — ASPIRIN 81 MG/1
81 TABLET ORAL DAILY
Qty: 90 TABLET | Refills: 1
Start: 2023-12-15

## 2024-01-10 ENCOUNTER — OFFICE VISIT (OUTPATIENT)
Age: 79
End: 2024-01-10
Payer: MEDICARE

## 2024-01-10 VITALS
BODY MASS INDEX: 27.85 KG/M2 | HEIGHT: 74 IN | OXYGEN SATURATION: 100 % | TEMPERATURE: 97.6 F | SYSTOLIC BLOOD PRESSURE: 107 MMHG | DIASTOLIC BLOOD PRESSURE: 71 MMHG | HEART RATE: 58 BPM | WEIGHT: 217 LBS | RESPIRATION RATE: 16 BRPM

## 2024-01-10 DIAGNOSIS — R05.9 COUGH, UNSPECIFIED TYPE: Primary | ICD-10-CM

## 2024-01-10 DIAGNOSIS — J06.9 UPPER RESPIRATORY TRACT INFECTION, UNSPECIFIED TYPE: ICD-10-CM

## 2024-01-10 PROCEDURE — G8427 DOCREV CUR MEDS BY ELIG CLIN: HCPCS | Performed by: INTERNAL MEDICINE

## 2024-01-10 PROCEDURE — 1123F ACP DISCUSS/DSCN MKR DOCD: CPT | Performed by: INTERNAL MEDICINE

## 2024-01-10 PROCEDURE — G8419 CALC BMI OUT NRM PARAM NOF/U: HCPCS | Performed by: INTERNAL MEDICINE

## 2024-01-10 PROCEDURE — G8484 FLU IMMUNIZE NO ADMIN: HCPCS | Performed by: INTERNAL MEDICINE

## 2024-01-10 PROCEDURE — 99213 OFFICE O/P EST LOW 20 MIN: CPT | Performed by: INTERNAL MEDICINE

## 2024-01-10 PROCEDURE — 1036F TOBACCO NON-USER: CPT | Performed by: INTERNAL MEDICINE

## 2024-01-10 RX ORDER — PREDNISONE 20 MG/1
40 TABLET ORAL DAILY
Qty: 12 TABLET | Refills: 0 | Status: SHIPPED | OUTPATIENT
Start: 2024-01-10 | End: 2024-01-16

## 2024-01-10 RX ORDER — ALBUTEROL SULFATE 90 UG/1
2 AEROSOL, METERED RESPIRATORY (INHALATION) EVERY 6 HOURS PRN
COMMUNITY

## 2024-01-10 ASSESSMENT — ENCOUNTER SYMPTOMS
COUGH: 1
FACIAL SWELLING: 0
SHORTNESS OF BREATH: 0
CONSTIPATION: 0
EYE ITCHING: 0
EYE DISCHARGE: 0
ABDOMINAL PAIN: 0
SORE THROAT: 0
BACK PAIN: 0
COLOR CHANGE: 0
WHEEZING: 0
EYE PAIN: 0
DIARRHEA: 0

## 2024-01-10 ASSESSMENT — PATIENT HEALTH QUESTIONNAIRE - PHQ9
SUM OF ALL RESPONSES TO PHQ QUESTIONS 1-9: 0
SUM OF ALL RESPONSES TO PHQ9 QUESTIONS 1 & 2: 0
SUM OF ALL RESPONSES TO PHQ QUESTIONS 1-9: 0
2. FEELING DOWN, DEPRESSED OR HOPELESS: 0
1. LITTLE INTEREST OR PLEASURE IN DOING THINGS: 0

## 2024-01-10 NOTE — PROGRESS NOTES
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    Thyroid nodule 02/11/2009      Past Surgical History:   Procedure Laterality Date    CARDIAC PROCEDURE N/A 10/25/2023    Intracardiac echocardiogram performed by Sanjuana Moses MD at [facility] CARDIAC CATH LAB    COLONOSCOPY      COLONOSCOPY N/A 10/21/2019    COLONOSCOPY- Check for device orders performed by Lawson Correia MD at [facility] ENDOSCOPY    ECHO 2D ADULT  8/15/11    EF 55%, biatrial enlargement, mild MR    EP DEVICE PROCEDURE N/A 10/25/2023    Watchman paolo closure device performed by Sanjuana Moses MD at [facility] CARDIAC CATH LAB    EYE SURGERY  2017    Cataract    HEENT  9/2008    nasal septoplasty    INVASIVE VASCULAR N/A 10/25/2023    Ultrasound guided vascular access performed by Sanjuana Moses MD at [facility] CARDIAC CATH LAB    JOINT REPLACEMENT  06/2021    Knee    KNEE ARTHROSCOPY  07/2016    ORTHOPEDIC SURGERY  07/01/2016    Right Knee Scope with meniscus surgery    PACEMAKER  Aug 2009    Dr. Marie    [place] UNLISTED PROCEDURE CARDIAC SURGERY      pacemaker placement    REFRACTIVE SURGERY  06/2017    Right Eye laser treatment     STRESS TEST, LEXISCAN  4/2008    11 min., normal perfusion, EF 57%    TOTAL KNEE ARTHROPLASTY Right 06/2021    VAS CAROTID DUPLEX BILATERAL  8/15/11    10-49% bilateral, unchanged from one year prior    VASECTOMY  25-30 yrs ago      Social History     Tobacco Use    Smoking status: Never    Smokeless tobacco: Never    Tobacco comments:     Never smoked   Substance Use Topics    Alcohol use: Not Currently      Family History   Problem Relation Age of Onset    Hypertension Father     Cancer Father         prostate    Alcohol Abuse Father     Heart Attack Father     Heart Disease Father     High Blood Pressure Father     High Cholesterol Father     Cancer Daughter         melanoma    Cancer Brother         melanoma    Hypertension Brother     Diabetes Brother     Arthritis Brother     High Blood Pressure Brother

## 2024-02-09 DIAGNOSIS — K58.0 IRRITABLE BOWEL SYNDROME WITH DIARRHEA: ICD-10-CM

## 2024-02-09 RX ORDER — DICYCLOMINE HYDROCHLORIDE 10 MG/1
10 CAPSULE ORAL 2 TIMES DAILY
Qty: 180 CAPSULE | Refills: 3 | Status: SHIPPED | OUTPATIENT
Start: 2024-02-09

## 2024-02-12 RX ORDER — OMEPRAZOLE 20 MG/1
20 CAPSULE, DELAYED RELEASE ORAL DAILY
Qty: 90 CAPSULE | Refills: 3 | Status: SHIPPED | OUTPATIENT
Start: 2024-02-12

## 2024-02-22 ENCOUNTER — APPOINTMENT (OUTPATIENT)
Facility: HOSPITAL | Age: 79
End: 2024-02-22
Payer: MEDICARE

## 2024-02-22 ENCOUNTER — HOSPITAL ENCOUNTER (EMERGENCY)
Facility: HOSPITAL | Age: 79
Discharge: HOME | End: 2024-02-22
Attending: EMERGENCY MEDICINE
Payer: MEDICARE

## 2024-02-22 VITALS
BODY MASS INDEX: 27.59 KG/M2 | TEMPERATURE: 98.2 F | DIASTOLIC BLOOD PRESSURE: 83 MMHG | OXYGEN SATURATION: 98 % | SYSTOLIC BLOOD PRESSURE: 184 MMHG | HEART RATE: 63 BPM | WEIGHT: 215 LBS | HEIGHT: 74 IN | RESPIRATION RATE: 18 BRPM

## 2024-02-22 DIAGNOSIS — S62.392A CLOSED NONDISPLACED FRACTURE OF OTHER PART OF THIRD METACARPAL BONE OF RIGHT HAND, INITIAL ENCOUNTER: ICD-10-CM

## 2024-02-22 DIAGNOSIS — S01.81XA CHIN LACERATION, INITIAL ENCOUNTER: Primary | ICD-10-CM

## 2024-02-22 DIAGNOSIS — S09.8XXA BLUNT HEAD TRAUMA, INITIAL ENCOUNTER: ICD-10-CM
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
## 2024-02-22 PROCEDURE — 99284 EMERGENCY DEPT VISIT MOD MDM: CPT

## 2024-02-22 PROCEDURE — 29125 APPL SHORT ARM SPLINT STATIC: CPT

## 2024-02-22 PROCEDURE — 73130 X-RAY EXAM OF HAND: CPT

## 2024-02-22 PROCEDURE — 70450 CT HEAD/BRAIN W/O DYE: CPT

## 2024-02-22 PROCEDURE — 73110 X-RAY EXAM OF WRIST: CPT

## 2024-02-22 PROCEDURE — 72125 CT NECK SPINE W/O DYE: CPT

## 2024-02-22 PROCEDURE — 70486 CT MAXILLOFACIAL W/O DYE: CPT

## 2024-02-22 ASSESSMENT — PAIN - FUNCTIONAL ASSESSMENT: PAIN_FUNCTIONAL_ASSESSMENT: 0-10

## 2024-02-22 ASSESSMENT — PAIN DESCRIPTION - LOCATION: LOCATION: FACE;HAND;KNEE

## 2024-02-22 ASSESSMENT — PAIN DESCRIPTION - ORIENTATION: ORIENTATION: RIGHT

## 2024-02-22 ASSESSMENT — PAIN SCALES - GENERAL: PAINLEVEL_OUTOF10: 6

## 2024-02-22 ASSESSMENT — PAIN DESCRIPTION - DESCRIPTORS: DESCRIPTORS: ACHING

## 2024-02-22 NOTE — ED TRIAGE NOTES
Patient feel while gong to pump gas.  Area to the nose, chin, right wrist, middle finger on the right side and right knee.     Tripped, denies passing out.

## 2024-02-22 NOTE — ED PROVIDER NOTES
ED Attending Physician Addendum    1:31 PM    MDM:    78M w/ mechanical trip and fall. On plavix. Now c/o facial pain w/ chin lac and right hand pain/swelling. Pt is hemodynamically stable w/o resp distress. No focal neuro deficits and able to stand and walk. CT head/cspine/max/face neg for acute findings. Xray hand showing nondisplaced 3rd metacarpal fx. Placed in volar splint and advised to f/u w/ hand surgery. I irrigated, explored and closed chin wound w/ dermabond. Recommended tylenol for pain.    Patient given specific return precautions and explained signs/symptoms for which to come back to ED immediately but otherwise advised to f/u w/ PCP over next 2-3days.        Splint Application    Date/Time: 2/22/2024 3:14 PM    Performed by: Arnold Haines MD  Authorized by: Arnold Haines MD    Consent:     Consent obtained:  Verbal    Consent given by:  Patient    Risks, benefits, and alternatives were discussed: yes      Risks discussed:  Discoloration, numbness, pain and swelling    Alternatives discussed:  No treatment  Universal protocol:     Patient identity confirmed:  Verbally with patient  Pre-procedure details:     Distal neurologic exam:  Normal    Distal perfusion: distal pulses strong and brisk capillary refill    Procedure details:     Location:  Hand    Hand location:  R hand    Splint type:  Volar short arm    Supplies:  Cotton padding and fiberglass  Post-procedure details:     Distal neurologic exam:  Unchanged    Distal perfusion: unchanged      Procedure completion:  Tolerated    Post-procedure imaging: not applicable    Lac Repair    Date/Time: 2/22/2024 3:15 PM    Performed by: Arnold Haines MD  Authorized by: Arnold Haines MD    Consent:     Consent obtained:  Verbal    Consent given by:  Patient    Risks, benefits, and alternatives were discussed: yes      Risks discussed:  Infection    Alternatives discussed:  No treatment  Universal protocol:     Patient identity confirmed:  Verbally with 
Living Expenses: Not hard at all   Transportation Needs: Unknown (9/16/2023)    PRAPARE - Transportation     Lack of Transportation (Non-Medical): No   Housing Stability: Unknown (9/16/2023)    Housing Stability Vital Sign     Unstable Housing in the Last Year: No           PHYSICAL EXAM    (up to 7 for level 4, 8 or more for level 5)     ED Triage Vitals [02/22/24 1214]   BP Temp Temp Source Pulse Respirations SpO2 Height Weight - Scale   (!) 184/83 98.2 °F (36.8 °C) Oral 63 18 98 % 1.88 m (6' 2\") 97.5 kg (215 lb)       Body mass index is 27.6 kg/m².    Physical Exam  Constitutional:       General: He is not in acute distress.     Appearance: He is not toxic-appearing.   HENT:      Nose:      Comments: Bruising to cartilage    Eyes:      Extraocular Movements: Extraocular movements intact.      Conjunctiva/sclera: Conjunctivae normal.   Cardiovascular:      Rate and Rhythm: Normal rate. Rhythm irregular.      Pulses: Normal pulses.   Pulmonary:      Effort: Pulmonary effort is normal.      Breath sounds: Normal breath sounds.   Musculoskeletal:      Right wrist: No swelling, deformity, lacerations, tenderness or snuff box tenderness. Normal range of motion. Normal pulse.      Right hand: Swelling present. No deformity or bony tenderness. Normal sensation.   Skin:     General: Skin is warm and dry.      Capillary Refill: Capillary refill takes less than 2 seconds.   Neurological:      General: No focal deficit present.      Mental Status: He is alert and oriented to person, place, and time.         DIAGNOSTIC RESULTS     EKG: All EKG's are interpreted by the Emergency Department Physician who either signs or Co-signs this chart in the absence of a cardiologist.        RADIOLOGY:   Non-plain film images such as CT, Ultrasound and MRI are read by the radiologist. Plain radiographic images are visualized and preliminarily interpreted by the emergency physician with the below findings:        Interpretation per the

## 2024-03-04 ENCOUNTER — TELEPHONE (OUTPATIENT)
Age: 79
End: 2024-03-04

## 2024-04-10 ENCOUNTER — CLINICAL DOCUMENTATION (OUTPATIENT)
Age: 79
End: 2024-04-10

## 2024-04-10 ENCOUNTER — OFFICE VISIT (OUTPATIENT)
Age: 79
End: 2024-04-10
Payer: MEDICARE

## 2024-04-10 VITALS
OXYGEN SATURATION: 96 % | WEIGHT: 215.4 LBS | BODY MASS INDEX: 27.64 KG/M2 | HEART RATE: 64 BPM | HEIGHT: 74 IN | RESPIRATION RATE: 16 BRPM | DIASTOLIC BLOOD PRESSURE: 64 MMHG | SYSTOLIC BLOOD PRESSURE: 120 MMHG

## 2024-04-10 DIAGNOSIS — I48.20 CHRONIC ATRIAL FIBRILLATION (HCC): Primary | ICD-10-CM

## 2024-04-10 DIAGNOSIS — I49.5 BRADY-TACHY SYNDROME (HCC): ICD-10-CM

## 2024-04-10 DIAGNOSIS — Z95.818 PRESENCE OF WATCHMAN LEFT ATRIAL APPENDAGE CLOSURE DEVICE: ICD-10-CM

## 2024-04-10 DIAGNOSIS — Z95.0 CARDIAC PACEMAKER IN SITU: ICD-10-CM

## 2024-04-10 DIAGNOSIS — Z79.01 ANTICOAGULATION ADEQUATE: ICD-10-CM

## 2024-04-10 DIAGNOSIS — I77.9 BILATERAL CAROTID ARTERY DISEASE, UNSPECIFIED TYPE (HCC): ICD-10-CM

## 2024-04-10 DIAGNOSIS — Z01.810 PREOP CARDIOVASCULAR EXAM: ICD-10-CM

## 2024-04-10 PROCEDURE — G8427 DOCREV CUR MEDS BY ELIG CLIN: HCPCS | Performed by: SPECIALIST

## 2024-04-10 PROCEDURE — 99214 OFFICE O/P EST MOD 30 MIN: CPT | Performed by: SPECIALIST

## 2024-04-10 PROCEDURE — 1123F ACP DISCUSS/DSCN MKR DOCD: CPT | Performed by: SPECIALIST

## 2024-04-10 PROCEDURE — 1036F TOBACCO NON-USER: CPT | Performed by: SPECIALIST

## 2024-04-10 PROCEDURE — G8419 CALC BMI OUT NRM PARAM NOF/U: HCPCS | Performed by: SPECIALIST

## 2024-04-10 NOTE — PATIENT INSTRUCTIONS

## 2024-04-10 NOTE — PROGRESS NOTES
had concerns including Atrial Fibrillation, Hyperlipidemia, and Other (Pacemaker ).    Vitals:    04/10/24 1311   BP: 120/64   Site: Left Upper Arm   Position: Sitting   Pulse: 64   Resp: 16   SpO2: 96%   Weight: 97.7 kg (215 lb 6.4 oz)   Height: 1.88 m (6' 2\")        Chest pain No    Refills No        1. Have you been to the ER, urgent care clinic since your last visit? No       Hospitalized since your last visit? No       Where?        Date? [facility]       NAME Basilio Zuluaga         1945      MRN    [id number]      LAST OFFICE APPOINTMENT: 12/15/2023     DIAGNOSIS    ICD-10-CM    1. Chronic atrial fibrillation (HCC)  I48.20       2. Anticoagulation adequate  Z79.01       3. Presence of Watchman left atrial appendage closure device  Z95.818       4. Aron-tachy syndrome (HCC)  I49.5       5. Bilateral carotid artery disease, unspecified type (HCC)  I77.9       6. Cardiac pacemaker in situ  Z95.0           HOME MEDICATION  Current Outpatient Medications   Medication Sig    omeprazole (PRILOSEC) 20 MG delayed release capsule TAKE 1 CAPSULE DAILY    dicyclomine (BENTYL) 10 MG capsule TAKE 1 CAPSULE TWICE A DAY    aspirin 81 MG EC tablet Take 1 tablet by mouth daily    metoprolol succinate (TOPROL XL) 50 MG extended release tablet Take 1.5 tablets by mouth daily    fluticasone (FLONASE) 50 MCG/ACT nasal spray 2 sprays by Nasal route daily    Cyanocobalamin (VITAMIN B 12) 500 MCG TABS Take 2,000 mcg by mouth daily lozenges    Probiotic Product (PROBIOTIC-10 PO) Take 1 capsule by mouth daily    atorvastatin (LIPITOR) 40 MG tablet Take 1 tablet by mouth every evening    clopidogrel (PLAVIX) 75 MG tablet Take 1 tablet by mouth daily    lisinopril (PRINIVIL;ZESTRIL) 2.5 MG tablet Take 1 tablet by mouth daily    KRILL OIL PO Take 1 capsule by mouth daily    acetaminophen (TYLENOL) 500 MG tablet Take by mouth 3 times daily as needed    diclofenac sodium (VOLTAREN) 1 % GEL as needed    loratadine (CLARITIN REDITABS) 
COLONOSCOPY      COLONOSCOPY N/A 10/21/2019    COLONOSCOPY- Check for device orders performed by Lawson Correia MD at [facility] ENDOSCOPY    ECHO 2D ADULT  8/15/11    EF 55%, biatrial enlargement, mild MR    EP DEVICE PROCEDURE N/A 10/25/2023    Watchman paolo closure device performed by Sanjuana Moses MD at [facility] CARDIAC CATH LAB    EYE SURGERY  2017    Cataract    HEENT  9/2008    nasal septoplasty    INVASIVE VASCULAR N/A 10/25/2023    Ultrasound guided vascular access performed by Sanjuana Moses MD at [facility] CARDIAC CATH LAB    JOINT REPLACEMENT  06/2021    Knee    KNEE ARTHROSCOPY  07/2016    ORTHOPEDIC SURGERY  07/01/2016    Right Knee Scope with meniscus surgery    PACEMAKER  Aug 2009    Dr. Cynthia PHAM UNLISTED PROCEDURE CARDIAC SURGERY      pacemaker placement    REFRACTIVE SURGERY  06/2017    Right Eye laser treatment     STRESS TEST, LEXISCAN  4/2008    11 min., normal perfusion, EF 57%    TOTAL KNEE ARTHROPLASTY Right 06/2021    VAS CAROTID DUPLEX BILATERAL  8/15/11    10-49% bilateral, unchanged from one year prior    VASECTOMY  25-30 yrs ago          ALLERGIES     Allergies   Allergen Reactions    Milk (Cow) Diarrhea    Neomycin     Bee Venom Rash     Yellowjackets    Clindamycin Hives and Rash    Penicillins Rash     States he is able to tolerate cephalexin with no adverse reaction      Sulfa Antibiotics Rash          FAMILY HISTORY     Family History   Problem Relation Age of Onset    Hypertension Father     Cancer Father         prostate    Alcohol Abuse Father     Heart Attack Father     Heart Disease Father     High Blood Pressure Father     High Cholesterol Father     Cancer Daughter         melanoma    Cancer Brother         melanoma    Hypertension Brother     Diabetes Brother     Arthritis Brother     High Blood Pressure Brother     High Cholesterol Brother     Osteoarthritis Brother     Diabetes Brother     Cancer Brother         prostate    Alcohol Abuse Brother     Stroke Mother     Arthritis Mother

## 2024-04-15 RX ORDER — METOPROLOL SUCCINATE 50 MG/1
TABLET, EXTENDED RELEASE ORAL
Qty: 135 TABLET | Refills: 3 | Status: SHIPPED | OUTPATIENT
Start: 2024-04-15

## 2024-04-19 RX ORDER — FLUTICASONE PROPIONATE 50 MCG
2 SPRAY, SUSPENSION (ML) NASAL DAILY
Qty: 48 G | Refills: 3 | Status: SHIPPED | OUTPATIENT
Start: 2024-04-19

## 2024-04-22 ENCOUNTER — APPOINTMENT (OUTPATIENT)
Facility: HOSPITAL | Age: 79
DRG: 065 | End: 2024-04-22
Payer: MEDICARE

## 2024-04-22 ENCOUNTER — HOSPITAL ENCOUNTER (INPATIENT)
Facility: HOSPITAL | Age: 79
LOS: 1 days | Discharge: HOME | DRG: 065 | End: 2024-04-23
Attending: STUDENT IN AN ORGANIZED HEALTH CARE EDUCATION/TRAINING PROGRAM | Admitting: HOSPITALIST
Payer: MEDICARE

## 2024-04-22 DIAGNOSIS — R42 VERTIGO: Primary | ICD-10-CM

## 2024-04-22 DIAGNOSIS — R26.2 AMBULATORY DYSFUNCTION: ICD-10-CM

## 2024-04-22 DIAGNOSIS — H83.2X9 VESTIBULAR DISEQUILIBRIUM, UNSPECIFIED LATERALITY: ICD-10-CM

## 2024-04-22 DIAGNOSIS — R42 DIZZINESS: ICD-10-CM

## 2024-04-22 LAB
ALBUMIN SERPL-MCNC: 3.7 G/DL (ref 3.5–5)
ALBUMIN/GLOB SERPL: 1.2 (ref 1.1–2.2)
ALP SERPL-CCNC: 138 U/L (ref 45–117)
ALT SERPL-CCNC: 17 U/L (ref 12–78)
ANION GAP SERPL CALC-SCNC: 5 MMOL/L (ref 5–15)
APPEARANCE UR: CLEAR
AST SERPL-CCNC: 21 U/L (ref 15–37)
BACTERIA URNS QL MICRO: NEGATIVE /HPF
BASOPHILS # BLD: 0.1 K/UL (ref 0–0.1)
BASOPHILS NFR BLD: 1 % (ref 0–1)
BILIRUB SERPL-MCNC: 1.2 MG/DL (ref 0.2–1)
BILIRUB UR QL: NEGATIVE
BUN SERPL-MCNC: 16 MG/DL (ref 6–20)
BUN/CREAT SERPL: 15 (ref 12–20)
CALCIUM SERPL-MCNC: 9 MG/DL (ref 8.5–10.1)
CHLORIDE SERPL-SCNC: 107 MMOL/L (ref 97–108)
CHOLEST SERPL-MCNC: 116 MG/DL
CO2 SERPL-SCNC: 29 MMOL/L (ref 21–32)
COLOR UR: ABNORMAL
COMMENT:: NORMAL
CREAT SERPL-MCNC: 1.08 MG/DL (ref 0.7–1.3)
DIFFERENTIAL METHOD BLD: ABNORMAL
EOSINOPHIL # BLD: 0 K/UL (ref 0–0.4)
EOSINOPHIL NFR BLD: 1 % (ref 0–7)
EPITH CASTS URNS QL MICRO: ABNORMAL /LPF
ERYTHROCYTE [DISTWIDTH] IN BLOOD BY AUTOMATED COUNT: 14.3 % (ref 11.5–14.5)
EST. AVERAGE GLUCOSE BLD GHB EST-MCNC: 114 MG/DL
GLOBULIN SER CALC-MCNC: 3.1 G/DL (ref 2–4)
GLUCOSE SERPL-MCNC: 115 MG/DL (ref 65–100)
GLUCOSE UR STRIP.AUTO-MCNC: NEGATIVE MG/DL
HBA1C MFR BLD: 5.6 % (ref 4–5.6)
HCT VFR BLD AUTO: 49.3 % (ref 36.6–50.3)
HDLC SERPL-MCNC: 52 MG/DL
HDLC SERPL: 2.2 (ref 0–5)
HGB BLD-MCNC: 16.3 G/DL (ref 12.1–17)
HGB UR QL STRIP: NEGATIVE
HYALINE CASTS URNS QL MICRO: ABNORMAL /LPF (ref 0–2)
IMM GRANULOCYTES # BLD AUTO: 0 K/UL (ref 0–0.04)
IMM GRANULOCYTES NFR BLD AUTO: 1 % (ref 0–0.5)
INR PPP: 1.2 (ref 0.9–1.1)
KETONES UR QL STRIP.AUTO: NEGATIVE MG/DL
LDLC SERPL CALC-MCNC: 56 MG/DL (ref 0–100)
LEUKOCYTE ESTERASE UR QL STRIP.AUTO: NEGATIVE
LYMPHOCYTES # BLD: 1.3 K/UL (ref 0.8–3.5)
LYMPHOCYTES NFR BLD: 23 % (ref 12–49)
MCH RBC QN AUTO: 28.3 PG (ref 26–34)
MCHC RBC AUTO-ENTMCNC: 33.1 G/DL (ref 30–36.5)
MCV RBC AUTO: 85.6 FL (ref 80–99)
MONOCYTES # BLD: 0.5 K/UL (ref 0–1)
MONOCYTES NFR BLD: 9 % (ref 5–13)
NEUTS SEG # BLD: 3.7 K/UL (ref 1.8–8)
NEUTS SEG NFR BLD: 65 % (ref 32–75)
NITRITE UR QL STRIP.AUTO: NEGATIVE
NRBC # BLD: 0 K/UL (ref 0–0.01)
NRBC BLD-RTO: 0 PER 100 WBC
PH UR STRIP: 5 (ref 5–8)
PLATELET # BLD AUTO: 156 K/UL (ref 150–400)
PMV BLD AUTO: 9.3 FL (ref 8.9–12.9)
POTASSIUM SERPL-SCNC: 4.3 MMOL/L (ref 3.5–5.1)
PROT SERPL-MCNC: 6.8 G/DL (ref 6.4–8.2)
PROT UR STRIP-MCNC: NEGATIVE MG/DL
PROTHROMBIN TIME: 12.3 SEC (ref 9–11.1)
RBC # BLD AUTO: 5.76 M/UL (ref 4.1–5.7)
RBC #/AREA URNS HPF: ABNORMAL /HPF (ref 0–5)
SODIUM SERPL-SCNC: 141 MMOL/L (ref 136–145)
SP GR UR REFRACTOMETRY: >1.03 (ref 1–1.03)
SPECIMEN HOLD: NORMAL
TRIGL SERPL-MCNC: 40 MG/DL
TROPONIN I SERPL HS-MCNC: 35 NG/L (ref 0–76)
URINE CULTURE IF INDICATED: ABNORMAL
UROBILINOGEN UR QL STRIP.AUTO: 1 EU/DL (ref 0.2–1)
VLDLC SERPL CALC-MCNC: 8 MG/DL
WBC # BLD AUTO: 5.6 K/UL (ref 4.1–11.1)
WBC URNS QL MICRO: ABNORMAL /HPF (ref 0–4)

## 2024-04-22 PROCEDURE — 70496 CT ANGIOGRAPHY HEAD: CPT

## 2024-04-22 PROCEDURE — 6360000002 HC RX W HCPCS: Performed by: STUDENT IN AN ORGANIZED HEALTH CARE EDUCATION/TRAINING PROGRAM

## 2024-04-22 PROCEDURE — 70450 CT HEAD/BRAIN W/O DYE: CPT

## 2024-04-22 PROCEDURE — 6370000000 HC RX 637 (ALT 250 FOR IP): Performed by: HOSPITALIST

## 2024-04-22 PROCEDURE — 97116 GAIT TRAINING THERAPY: CPT

## 2024-04-22 PROCEDURE — 85025 COMPLETE CBC W/AUTO DIFF WBC: CPT

## 2024-04-22 PROCEDURE — 6360000002 HC RX W HCPCS: Performed by: HOSPITALIST

## 2024-04-22 PROCEDURE — 97165 OT EVAL LOW COMPLEX 30 MIN: CPT

## 2024-04-22 PROCEDURE — 85610 PROTHROMBIN TIME: CPT

## 2024-04-22 PROCEDURE — 80061 LIPID PANEL: CPT

## 2024-04-22 PROCEDURE — 36415 COLL VENOUS BLD VENIPUNCTURE: CPT

## 2024-04-22 PROCEDURE — 84484 ASSAY OF TROPONIN QUANT: CPT

## 2024-04-22 PROCEDURE — 4A03X5D MEASUREMENT OF ARTERIAL FLOW, INTRACRANIAL, EXTERNAL APPROACH: ICD-10-PCS | Performed by: RADIOLOGY

## 2024-04-22 PROCEDURE — 99223 1ST HOSP IP/OBS HIGH 75: CPT | Performed by: PSYCHIATRY & NEUROLOGY

## 2024-04-22 PROCEDURE — 0042T CT BRAIN PERFUSION: CPT

## 2024-04-22 PROCEDURE — 97530 THERAPEUTIC ACTIVITIES: CPT

## 2024-04-22 PROCEDURE — 96374 THER/PROPH/DIAG INJ IV PUSH: CPT

## 2024-04-22 PROCEDURE — 81001 URINALYSIS AUTO W/SCOPE: CPT

## 2024-04-22 PROCEDURE — 99222 1ST HOSP IP/OBS MODERATE 55: CPT | Performed by: SPECIALIST

## 2024-04-22 PROCEDURE — 94761 N-INVAS EAR/PLS OXIMETRY MLT: CPT

## 2024-04-22 PROCEDURE — 6360000004 HC RX CONTRAST MEDICATION: Performed by: RADIOLOGY

## 2024-04-22 PROCEDURE — 97162 PT EVAL MOD COMPLEX 30 MIN: CPT

## 2024-04-22 PROCEDURE — 83036 HEMOGLOBIN GLYCOSYLATED A1C: CPT

## 2024-04-22 PROCEDURE — 80053 COMPREHEN METABOLIC PANEL: CPT

## 2024-04-22 PROCEDURE — 2580000003 HC RX 258: Performed by: HOSPITALIST

## 2024-04-22 PROCEDURE — 99285 EMERGENCY DEPT VISIT HI MDM: CPT

## 2024-04-22 PROCEDURE — 2060000000 HC ICU INTERMEDIATE R&B

## 2024-04-22 RX ORDER — ATORVASTATIN CALCIUM 20 MG/1
80 TABLET, FILM COATED ORAL NIGHTLY
Status: DISCONTINUED | OUTPATIENT
Start: 2024-04-22 | End: 2024-04-23 | Stop reason: HOSPADM

## 2024-04-22 RX ORDER — POLYETHYLENE GLYCOL 3350 17 G/17G
17 POWDER, FOR SOLUTION ORAL DAILY PRN
Status: DISCONTINUED | OUTPATIENT
Start: 2024-04-22 | End: 2024-04-23 | Stop reason: HOSPADM

## 2024-04-22 RX ORDER — MECLIZINE HCL 12.5 MG/1
12.5 TABLET ORAL 3 TIMES DAILY PRN
Status: DISCONTINUED | OUTPATIENT
Start: 2024-04-22 | End: 2024-04-23 | Stop reason: HOSPADM

## 2024-04-22 RX ORDER — ONDANSETRON 2 MG/ML
4 INJECTION INTRAMUSCULAR; INTRAVENOUS EVERY 6 HOURS PRN
Status: DISCONTINUED | OUTPATIENT
Start: 2024-04-22 | End: 2024-04-23 | Stop reason: HOSPADM

## 2024-04-22 RX ORDER — TADALAFIL 5 MG/1
5 TABLET ORAL EVERY EVENING
Status: DISCONTINUED | OUTPATIENT
Start: 2024-04-22 | End: 2024-04-23 | Stop reason: HOSPADM

## 2024-04-22 RX ORDER — ENOXAPARIN SODIUM 100 MG/ML
30 INJECTION SUBCUTANEOUS 2 TIMES DAILY
Status: DISCONTINUED | OUTPATIENT
Start: 2024-04-22 | End: 2024-04-23

## 2024-04-22 RX ORDER — FLUTICASONE PROPIONATE 50 MCG
2 SPRAY, SUSPENSION (ML) NASAL DAILY
Status: DISCONTINUED | OUTPATIENT
Start: 2024-04-22 | End: 2024-04-23 | Stop reason: HOSPADM

## 2024-04-22 RX ORDER — LANOLIN ALCOHOL/MO/W.PET/CERES
5 CREAM (GRAM) TOPICAL NIGHTLY PRN
Status: DISCONTINUED | OUTPATIENT
Start: 2024-04-22 | End: 2024-04-23 | Stop reason: HOSPADM

## 2024-04-22 RX ORDER — SODIUM CHLORIDE 0.9 % (FLUSH) 0.9 %
5-40 SYRINGE (ML) INJECTION PRN
Status: DISCONTINUED | OUTPATIENT
Start: 2024-04-22 | End: 2024-04-23 | Stop reason: HOSPADM

## 2024-04-22 RX ORDER — ASPIRIN 81 MG/1
81 TABLET ORAL DAILY
Status: DISCONTINUED | OUTPATIENT
Start: 2024-04-22 | End: 2024-04-23

## 2024-04-22 RX ORDER — PANTOPRAZOLE SODIUM 40 MG/1
40 TABLET, DELAYED RELEASE ORAL
Status: DISCONTINUED | OUTPATIENT
Start: 2024-04-22 | End: 2024-04-23 | Stop reason: HOSPADM

## 2024-04-22 RX ORDER — SODIUM CHLORIDE 0.9 % (FLUSH) 0.9 %
5-40 SYRINGE (ML) INJECTION EVERY 12 HOURS SCHEDULED
Status: DISCONTINUED | OUTPATIENT
Start: 2024-04-22 | End: 2024-04-23 | Stop reason: HOSPADM

## 2024-04-22 RX ORDER — CLOPIDOGREL BISULFATE 75 MG/1
75 TABLET ORAL DAILY
Status: DISCONTINUED | OUTPATIENT
Start: 2024-04-22 | End: 2024-04-23

## 2024-04-22 RX ORDER — LABETALOL HYDROCHLORIDE 5 MG/ML
10 INJECTION, SOLUTION INTRAVENOUS EVERY 10 MIN PRN
Status: DISCONTINUED | OUTPATIENT
Start: 2024-04-22 | End: 2024-04-23 | Stop reason: HOSPADM

## 2024-04-22 RX ORDER — 0.9 % SODIUM CHLORIDE 0.9 %
500 INTRAVENOUS SOLUTION INTRAVENOUS ONCE
Status: COMPLETED | OUTPATIENT
Start: 2024-04-22 | End: 2024-04-22

## 2024-04-22 RX ORDER — DICYCLOMINE HYDROCHLORIDE 10 MG/1
10 CAPSULE ORAL 2 TIMES DAILY
Status: DISCONTINUED | OUTPATIENT
Start: 2024-04-22 | End: 2024-04-23 | Stop reason: HOSPADM

## 2024-04-22 RX ORDER — SODIUM CHLORIDE 9 MG/ML
INJECTION, SOLUTION INTRAVENOUS CONTINUOUS
Status: DISPENSED | OUTPATIENT
Start: 2024-04-22 | End: 2024-04-23

## 2024-04-22 RX ORDER — SODIUM CHLORIDE 9 MG/ML
INJECTION, SOLUTION INTRAVENOUS PRN
Status: DISCONTINUED | OUTPATIENT
Start: 2024-04-22 | End: 2024-04-23 | Stop reason: HOSPADM

## 2024-04-22 RX ADMIN — SODIUM CHLORIDE 500 ML: 9 INJECTION, SOLUTION INTRAVENOUS at 06:29

## 2024-04-22 RX ADMIN — PANTOPRAZOLE SODIUM 40 MG: 40 TABLET, DELAYED RELEASE ORAL at 06:23

## 2024-04-22 RX ADMIN — ONDANSETRON 4 MG: 2 INJECTION INTRAMUSCULAR; INTRAVENOUS at 03:35

## 2024-04-22 RX ADMIN — SODIUM CHLORIDE: 9 INJECTION, SOLUTION INTRAVENOUS at 07:53

## 2024-04-22 RX ADMIN — ONDANSETRON 4 MG: 2 INJECTION INTRAMUSCULAR; INTRAVENOUS at 09:56

## 2024-04-22 RX ADMIN — DICYCLOMINE HYDROCHLORIDE 10 MG: 10 CAPSULE ORAL at 20:02

## 2024-04-22 RX ADMIN — FLUTICASONE PROPIONATE 2 SPRAY: 50 SPRAY, METERED NASAL at 10:32

## 2024-04-22 RX ADMIN — ATORVASTATIN CALCIUM 80 MG: 20 TABLET, FILM COATED ORAL at 20:02

## 2024-04-22 RX ADMIN — IOPAMIDOL 140 ML: 755 INJECTION, SOLUTION INTRAVENOUS at 03:24

## 2024-04-22 RX ADMIN — Medication 4.5 MG: at 20:02

## 2024-04-22 RX ADMIN — SODIUM CHLORIDE, PRESERVATIVE FREE 10 ML: 5 INJECTION INTRAVENOUS at 20:10

## 2024-04-22 RX ADMIN — SODIUM CHLORIDE: 9 INJECTION, SOLUTION INTRAVENOUS at 14:10

## 2024-04-22 RX ADMIN — ENOXAPARIN SODIUM 30 MG: 100 INJECTION SUBCUTANEOUS at 20:03

## 2024-04-22 RX ADMIN — ASPIRIN 81 MG: 81 TABLET, COATED ORAL at 20:02

## 2024-04-22 RX ADMIN — CLOPIDOGREL BISULFATE 75 MG: 75 TABLET ORAL at 20:09

## 2024-04-22 RX ADMIN — ENOXAPARIN SODIUM 30 MG: 100 INJECTION SUBCUTANEOUS at 10:32

## 2024-04-22 RX ADMIN — DICYCLOMINE HYDROCHLORIDE 10 MG: 10 CAPSULE ORAL at 10:32

## 2024-04-22 ASSESSMENT — PAIN - FUNCTIONAL ASSESSMENT
PAIN_FUNCTIONAL_ASSESSMENT: NONE - DENIES PAIN
PAIN_FUNCTIONAL_ASSESSMENT: ACTIVITIES ARE NOT PREVENTED

## 2024-04-22 NOTE — CONSULTS
[facility] CARDIOLOGY                       Cardiology Care Note     [x]Initial Encounter     []Follow-up    Patient Name: Basilio Zuluaga - :1945 - MRN:[id number]  Primary Cardiologist: Ovidio Hernández MD  Consulting Cardiologist: Silvia Cotton MD     Reason for encounter:  Neurology recommending consult to determine if pt should go back on OAC Pradaxa or eliquis vs remaining on ASA and plavix. H/O       HPI:       Basilio Zuluaga is a 78 y.o.  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     Hospitalist notes \" Patient reports Saturday night had dizziness after getting up to go to bathroom each of the 3 times.  Had to hold onto counter.  Lasted for seconds.  Felt fine all day .  LKW 11pm on  when went to bed.  Woke up at 1:30am today to go to bathroom and became extremely dizzy half way to bathroom, unsteady gait, had to hold onto counter and doorframe and wall.  Made back to bed, became very nauseated.  Wife called 911.  BP very high for /113 per pt, .  He could get himself to transport stretcher without assistance.  Had similar sx 10 years ago, attributed to medication.\"       Dr. 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 \"    He denies any new cardiac complaints.         Assessment and Plan       TIA / CVA  - Has had multiple prior CVA's (including  Dec 2022).  Has previously been on

## 2024-04-22 NOTE — PLAN OF CARE
Problem: Physical Therapy - Adult  Goal: By Discharge: Performs mobility at highest level of function for planned discharge setting.  See evaluation for individualized goals.  Description: FUNCTIONAL STATUS PRIOR TO ADMISSION: Patient was independent and active without use of DME. Pt enjoys going to water aerobics, balance class x 1 week    HOME SUPPORT PRIOR TO ADMISSION: The patient lived with spouse but did not require assistance.    Physical Therapy Goals  Initiated 4/22/2024  1.  Patient will move from supine to sit and sit to supine and roll side to side in bed with independence within 7 day(s).    2.  Patient will perform sit to stand with modified independence within 7 day(s).  3.  Patient will transfer from bed to chair and chair to bed with modified independence using the least restrictive device within 7 day(s).  4.  Patient will ambulate with modified independence for 200 feet with the least restrictive device within 7 day(s).   5.  Patient will ascend/descend 5 stairs with single handrail(s) with supervision/set-up within 7 day(s).  6.  Patient will improve Baires Balance score by 7 points within 7 days.    Outcome: Progressing   PHYSICAL THERAPY EVALUATION    Patient: Basilio Zuluaga (78 y.o. male)  Date: 4/22/2024  Primary Diagnosis: Dizziness [R42]  Vertigo [R42]  Ambulatory dysfunction [R26.2]       Precautions: Restrictions/Precautions: Fall Risk (recent R hand fx with splint)                      ASSESSMENT :   DEFICITS/IMPAIRMENTS:   The patient is limited by decreased activity tolerance, balance, gait s/p admission on 4/22 with dizziness. Head CT negative for acute process. Pt with baseline impaired vision from previous CVA.      Based on the impairments listed above pt demonstrated intact and equal BLE strength, intact sensation and coordination. Orthostatics negative for orthostatic hypotension. Pt completed transfers mod I and tolerated gait training x 80 ft with CGA. Pt with 1 episode LOB                                                               Pain Ratin/10   Pain Intervention(s):       Activity Tolerance:   Good    After treatment:   Patient left in no apparent distress sitting up in chair, Call bell within reach, and Caregiver / family present    COMMUNICATION/EDUCATION:   The patient's plan of care was discussed with: occupational therapist and registered nurse    Patient Education  Education Given To: Patient  Education Provided: Role of Therapy;Plan of Care  Education Provided Comments: BEFAST  Education Method: Verbal  Barriers to Learning: None  Education Outcome: Verbalized understanding    Thank you for this referral.  Digna Waters PT, DPT  Minutes: 28

## 2024-04-22 NOTE — PROGRESS NOTES
0720: Bedside and Verbal shift change report given to Martell RN (oncoming nurse) by Jacob RN (offgoing nurse). Report included the following information Adult Overview, Recent Results, Med Rec Status, and Cardiac Rhythm V Paced .      0839: spoke with pharmacy advised pt reports takes plavix at night. Marry advised that since he didn't get it last night patient should take this mornings dose then after to send a message and they can retime it to nightly.     0842: messaged provider advised pt home med list is in and that MAR is not reflecting some of the meds he takes in the morning such as Silodosin and fish oil, asked if provider could review the list and order meds needed.     0901: messaged provider advised pt is NPO however passed swallow screen asked if he could place diet order so we could administer morning PO meds.     0937: attempted to call provider for diet order, no answer    1633: called pharmacy back advised pt takes plavix nightly and DID take it last night so he did NOT take this mornings dose. Advised needed doses time for nightly.     1946: Bedside and Verbal shift change report given to Audra ARRIETA (oncoming nurse) by Martell RN (offgoing nurse). Report included the following information Adult Overview, Recent Results, Med Rec Status, and Cardiac Rhythm V Paced .

## 2024-04-22 NOTE — PROGRESS NOTES
Stroke Education provided to patient and spouse/SO and the following topics were discussed    1. Patients personal risk factors for stroke are atrial fibrillation, family history, hyperlipidemia, and hypertension    2. Warning signs of Stroke:        * Sudden numbness or weakness of the face, arm or leg, especially on one side of          The body            * Sudden confusion, trouble speaking or understanding        * Sudden trouble seeing in one or both eyes        * Sudden trouble walking, dizziness, loss of balance or coordination        * Sudden severe headache with no known cause      3. Importance of activation Emergency Medical Services ( 9-1-1 ) immediately if experience any warning signs of stroke.    4. Be sure and schedule a follow-up appointment with your primary care doctor or any specialists as instructed.     5. You must take medicine every day to treat your risk factors for stroke.  Be sure to take your medicines exactly as your doctor tells you: no more, no less.  Know what your medicines are for , what they do.  Anti-thrombotics /anticoagulants can help prevent strokes.  You are taking the following medicine(s)  Plavix and Aspirin     6.  Smoking and second-hand smoke greatly increase your risk of stroke, cardiovascular disease and death. Smoking history never    7. Information provided was [facility] Stroke Education Binder, Stroke Handouts, or Verbal Education    8. Documentation of teaching completed in Patient Education Activity and on Care Plan with teaching response noted?  yes

## 2024-04-22 NOTE — PROGRESS NOTES
[facility]  [address] [place]  [address] [phone]         Hospitalist Progress Note        NAME:  Basilio Zuluaga   :  1945   MRN:  [id number]    Date/Time:  2024     Patient PCP:  Jarvis Cleemnts MD    Code Status:  Full Code     Isolation Precautions: No active isolations    Barrier(s) to discharge: Currently not medically stable for discharge  Estimated date of discharge: 1-2 days  Discharge disposition:   Likely outpatient rehab    Assessment/Plan:      Ambulatory dysfunction due to dizziness/disequilibrium  Dizziness and nausea  History of embolic stroke to the eyes in  and  with residual visual impairment  No improvement  Continue with aspirin and Plavix along with high-dose Lipitor  Unable to get MRI due to old retained leads  Orthostatic VS by PT/ OT are normal  CT Head w/o any acute abnormality  CTA Neck is normal  CTA Head shows: 1)  CTA Head: No large vessel occlusion. At least moderate to severe stenosis of supraclinoid left ICA, suboptimally evaluated due to calcified plaques. 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    Neurology note reviewed and is recommending cardiology consult to determine the patient to go back on OAC such as Pradaxa which was previously on or Eliquis versus continuing aspirin and Plavix.  Per note neurology also recommending outpatient ENT.    History of A-fib status post watchman and 10/23  History of pacemaker  Echo  showed left atrial appendage occlusion by Watchman as desired    BPH  Holding Rapaflo for now as Flomax per hospital formulary is caution due to possible stroke  Continue Cialis    Hyperlipidemia  Continue Lipitor but increase dose as mentioned above  Lipid panel pending    HTN, benign  Holding lisinopril and Toprol-XL for permissive hypertension  Consider resuming tomorrow    GERD  Stable.  Continue PPI    Overweight (25.0 - 29.9): Body mass index

## 2024-04-22 NOTE — H&P
Hospitalist Admission Note    NAME: Basilio Zuluaga   :  1945   MRN:  [id number]     Date/Time:  2024 4:23 AM    Patient PCP: Jarvis Clements MD    Please note that this dictation was completed with Proxio, the computer voice recognition software.  Quite often unanticipated grammatical, syntax, homophones, and other interpretive errors are inadvertently transcribed by the computer software.  Please disregard these errors.  Please excuse any errors that have escaped final proofreading  ________________________________________________________________________    Given the patient's current clinical presentation, I have a high level of concern for decompensation if discharged from the emergency department.  Complex decision making was performed, which includes reviewing the patient's available past medical records, laboratory results, and x-ray films.       My assessment of this patient's clinical condition and my plan of care is as follows.    Assessment / Plan:    Dizziness and nausea  Ambulatory dysfunction due to dizziness  Rule out stroke  Hx embolic stroke to eyes  and  with residual vision impairment  --CT head, CTA head and neck, CT brain perfusion without LVO or acute process.  --cont aspirin and plavix. Needs to stay on plavix indefinitely  --increase lipitor to 80mg  --will need to check with his cardiologist Dr. Moses if able to have MRI brain.  Per pt, had [place] pacemaker placed 2009, had new pacemaker placed by Dr. Moses 2 years ago but told he cannot have MRI as still have old leads.    --check lipid panel, A1c, limited echo  --neurology consult  --check orthostatic.  Urine SG >1.030 suggest he could be dehydrated.  --await CMP  --empiric meclizine prn dizziness  --pt/ot consult    Hx afib s/p Watchman 10/23  Hx pacemaker  --echo  showed left atrial appendage occlusion by Watchman as desired  --need to check with cardiologist Dr. Moses if able to have brain MRI as

## 2024-04-22 NOTE — ED PROVIDER NOTES
[facility] EMERGENCY DEPT  EMERGENCY DEPARTMENT ENCOUNTER      Pt Name: Basilio Zuluaga  MRN: [id number]  Birthdate 1945  Date of evaluation: 4/22/2024  Provider: Rubi Chapa MD    CHIEF COMPLAINT       Chief Complaint   Patient presents with    Dizziness     HISTORY OF PRESENT ILLNESS   (Location/Symptom, Timing/Onset, Context/Setting, Quality, Duration, Modifying Factors, Severity)  Note limiting factors.   HPI  78-year-old male with past medical history of A-fib status post Watchman device placement, CAD, polly tachy syndrome status post pacemaker placement, prior CVA manifesting as ocular strokes, dyslipidemia, bilateral carotid artery disease, presents to the ER with EMS for evaluation of \"dizziness.\" States he was in his usual state of health when he went to sleep last night around 11PM. He then woke up around 1:30 AM to use bathroom, but immediately noticed symptoms of dizziness described as room spinning sensation that made it impossible for him to ambulate.  Patient reports he attempted several times to stand up, and take steps, but has been unable to walk since symptom onset.  Reports some associated mild nausea.  He otherwise denies headache, vision changes, confusion, speech deficits, facial droop, weakness, numbness.  Patient reports prior to tonight, on Saturday, he had 3 episodes of transient dizziness lasting only a few seconds that self resolved.  However, reports current symptoms is different due to its persistent nature, as well as increase severity.    Review of External Medical Records:     Nursing Notes were reviewed.    REVIEW OF SYSTEMS    (2-9 systems for level 4, 10 or more for level 5)     Review of Systems   All other systems reviewed and are negative.      Except as noted above the remainder of the review of systems was reviewed and negative.       PAST MEDICAL HISTORY     Past Medical History:   Diagnosis Date    AR (allergic rhinitis) 02/11/2009    Atrial fibrillation (HCC) 2003 to      Prostate Cancer Brother     Anesth Problems Neg Hx           SOCIAL HISTORY       Social History     Socioeconomic History    Marital status:    Tobacco Use    Smoking status: Never    Smokeless tobacco: Never    Tobacco comments:     Never smoked   Vaping Use    Vaping Use: Never used   Substance and Sexual Activity    Alcohol use: Not Currently    Drug use: Never    Sexual activity: Not Currently     Partners: Female     Social Determinants of Health     Financial Resource Strain: Low Risk  (9/16/2023)    Overall Financial Resource Strain (CARDIA)     Difficulty of Paying Living Expenses: Not hard at all   Transportation Needs: Unknown (9/16/2023)    PRAPARE - Transportation     Lack of Transportation (Non-Medical): No   Housing Stability: Unknown (9/16/2023)    Housing Stability Vital Sign     Unstable Housing in the Last Year: No           PHYSICAL EXAM    (up to 7 for level 4, 8 or more for level 5)     ED Triage Vitals   BP Temp Temp src Pulse Resp SpO2 Height Weight   -- -- -- -- -- -- -- --       Body mass index is 29.66 kg/m².    Physical Exam  Vitals and nursing note reviewed.   Constitutional:       General: He is not in acute distress.     Appearance: Normal appearance. He is normal weight. He is not ill-appearing.   HENT:      Head: Normocephalic and atraumatic.      Nose: Nose normal.      Mouth/Throat:      Mouth: Mucous membranes are moist.   Eyes:      Extraocular Movements: Extraocular movements intact.      Pupils: Pupils are equal, round, and reactive to light.   Cardiovascular:      Rate and Rhythm: Normal rate and regular rhythm.      Pulses: Normal pulses.      Heart sounds: Normal heart sounds.   Pulmonary:      Effort: Pulmonary effort is normal.      Breath sounds: Normal breath sounds.   Abdominal:      General: Abdomen is flat.      Palpations: Abdomen is soft.      Tenderness: There is no abdominal tenderness.   Musculoskeletal:         General: Normal range of motion.

## 2024-04-22 NOTE — ED NOTES
Signs and symptoms: Dizziness   Code Stroke activation time: 0301  Provider at bedside time:  0301  VAN score: Negative  Last Known Well (Time): 0130  Blood Glucose Result/Time: 116 ems  Blood Pressure: 192/91  Anticoagulants (List medications): Plavix, Aspirin

## 2024-04-22 NOTE — THERAPY EVALUATION
OCCUPATIONAL THERAPY EVALUATION/DISCHARGE  Patient: Basilio Zuluaga (78 y.o. male)  Date: 4/22/2024  Primary Diagnosis: Dizziness [R42]  Vertigo [R42]  Ambulatory dysfunction [R26.2]         Precautions: Fall Risk (recent R hand fx with splint)          ASSESSMENT :  Based on the objective data below, the patient presents with impaired dynamic standing balance and ongoing dizziness with nausea following admission for work up after pt presented with dizziness. CT head negative and MRI not yet completed (PPM in place). Of note, has baseline vision impairments from h/o CVA in 2015 and recent hand fracture with splint/brace in place. Patient with overall functional, symmetrical, and intact UE ROM, coordination, and strength in UEs to participate in functional tasks. BP monitored throughout (see below) and pt asymptomatic at first, however does report delayed onset of dizziness/nausea post activity. Pt and family receptive to all instruction provided. No further acute skilled OT services indicated at this time.     Patient Vitals:   Pulse BP Patient Position   04/22/24 0944 70 (!) 179/92 Sitting   04/22/24 0935 65 (!) 164/88 Standing   04/22/24 0926 66 (!) 161/86 Sitting   04/22/24 0923 66 (!) 154/92 Supine     Functional Outcome Measure:  The patient scored 64/66 on the FMA outcome measure which is indicative of minimal functional impairment in UEs following admission for CVA work up.      Further skilled acute occupational therapy is not indicated at this time.     PLAN :  Recommend with staff: OOB to chair for all meals; mobility to bathroom for toileting; ADLs as needed    Recommendation for discharge: (in order for the patient to meet his/her long term goals): No skilled occupational therapy - discussed OP PT for balance    Other factors to consider for discharge: no additional factors    IF patient discharges home will need the following DME: patient owns DME required for discharge     SUBJECTIVE:   Patient stated,

## 2024-04-22 NOTE — CONSULTS
INPATIENT NEUROLOGY CONSULT NOTE    NAME:     Basilio Zuluaga  ADMIT DATE:    4/22/2024  2:52 AM  CONSULT DATE:  04/22/24  REQUESTING PROVIDER: Sadie Barr MD      HPI: 78 y.o. male who  has a past medical history of AR (allergic rhinitis), Atrial fibrillation (HCC), BPH (benign prostatic hypertrophy), Aron-tachy syndrome (HCC), CAD (coronary artery disease), Colon polyp, COVID-19, Dyslipidemia, GERD (gastroesophageal reflux disease), OA (osteoarthritis) of knee, Pacemaker, PSA elevation, Skin moles, Stroke (HCC), and Thyroid nodule.    Neurology is asked to evaluate to patient for: dizziness, nausea, difficulty walking     Daughter in room with patient but he provides majority of history.  He says that he had woke up 3 times on 4-21-24 early AM to use bathroom and each time he had brief/ few seconds of significant unsteady gait which was not normal for him.  Woke up yesterday AM, says no dizziness/ unsteadiness during the day.  Early this AM woke up to use bathroom and when ambulating was again very unsteady but time associated with nausea.  No other neurologic symptoms (slurred speech, facial droop, extremity weakness/ numbness, double vision, etc). Called 911.  Says when EMS arrived, he was unable to stand due to unsteadiness/ dizziness (no spinning sensation) and had to be put in wheelchair then transferred to stretcher before getting in EMS van.  Says today he has ambulated independently in room w/o recurrence of dizziness but toward the end of PT/ OT assessment later this AM, he was feeling off balance again and some nausea.      Reports hx of stroke to small stroke to left eye couple years back, then tiny stroke to right eye in 2022. Has hx of Atrial Fibrillation, Pacemaker, Watchman device.  Says that in past he was on Pradaxa and Plavix but after the right eye stroke, Cardiologist switched him to Eliquis, Plavix (?+ ASA) and later they decided to do Watchman Implant (Jan 2024).  A few weeks to month  (LOVENOX) injection 30 mg, 30 mg, SubCUTAneous, BID, Sadie Barr MD, 30 mg at 04/22/24 1032    0.9 % sodium chloride infusion, , IntraVENous, Continuous, Sadie Barr MD, Last Rate: 100 mL/hr at 04/22/24 0753, New Bag at 04/22/24 0753    atorvastatin (LIPITOR) tablet 80 mg, 80 mg, Oral, Nightly, Sadie Barr MD    clopidogrel (PLAVIX) tablet 75 mg, 75 mg, Oral, Daily, Sadie Barr MD    labetalol (NORMODYNE;TRANDATE) injection 10 mg, 10 mg, IntraVENous, Q10 Min PRN, Sadie Barr MD    meclizine (ANTIVERT) tablet 12.5 mg, 12.5 mg, Oral, TID PRN, Sadie Barr MD    diclofenac sodium (VOLTAREN) 1 % gel 2 g, 2 g, Topical, 4x Daily PRN, Sadie Barr MD    dicyclomine (BENTYL) capsule 10 mg, 10 mg, Oral, BID, Sadie Barr MD, 10 mg at 04/22/24 1032    fluticasone (FLONASE) 50 MCG/ACT nasal spray 2 spray, 2 spray, Each Nostril, Daily, Sadie Barr MD, 2 spray at 04/22/24 1032    melatonin tablet 4.5 mg, 4.5 mg, Oral, Nightly PRN, Sadie Barr MD    pantoprazole (PROTONIX) tablet 40 mg, 40 mg, Oral, QAM AC, Sadie Barr MD, 40 mg at 04/22/24 0623    tadalafil (CIALIS) tablet 5 mg (Patient Supplied), 5 mg, Oral, QPM, Sadie Barr MD    aspirin EC tablet 81 mg, 81 mg, Oral, Daily, Otis Rivas MD    =====================================    MEDICAL DECISION MAKING (2 of 3: A +/- B +/- C)    A) Number and Complexity of Problem(s) Addressed:  [] 1 stable, acute illness (Low)  [] 1 stable, chronic illness (Low)  [] 1 acute illness with systemic symptoms (Moderate)  [] 1 undiagnosed new problem with uncertain prognosis (Moderate)  [] 1 or more chronic illness with exacerbation, progression, or side effects of treatment (Moderate)  [] 1 or more chronic illnesses with severe exacerbation, progression, or side effects of treatment (High)  [x] 1 acute or chronic illness or injury that poses a threat to life or bodily function (High)    B) Amount/ Complexity of Data reviewed (1 of 3 categories

## 2024-04-22 NOTE — ED TRIAGE NOTES
Pt has been experiencing dizziness tonight and last night. Tonight around 0130 he tried to get up to go to the bathroom and was unable to ambulate. Pt has PMH of hypertension

## 2024-04-23 ENCOUNTER — APPOINTMENT (OUTPATIENT)
Facility: HOSPITAL | Age: 79
DRG: 065 | End: 2024-04-23
Attending: HOSPITALIST
Payer: MEDICARE

## 2024-04-23 VITALS
TEMPERATURE: 97.7 F | HEART RATE: 62 BPM | HEIGHT: 74 IN | RESPIRATION RATE: 16 BRPM | DIASTOLIC BLOOD PRESSURE: 78 MMHG | OXYGEN SATURATION: 100 % | WEIGHT: 217 LBS | SYSTOLIC BLOOD PRESSURE: 150 MMHG | BODY MASS INDEX: 27.85 KG/M2

## 2024-04-23 LAB
ECHO AO ARCH DIAM: 2.3 CM
ECHO AO ASC DIAM: 3.6 CM
ECHO AO ASCENDING AORTA INDEX: 1.6 CM/M2
ECHO AO ROOT DIAM: 3.5 CM
ECHO AO ROOT INDEX: 1.56 CM/M2
ECHO AR MAX VEL PISA: 3.2 M/S
ECHO AV AREA PEAK VELOCITY: 2 CM2
ECHO AV AREA/BSA PEAK VELOCITY: 0.9 CM2/M2
ECHO AV PEAK GRADIENT: 8 MMHG
ECHO AV PEAK VELOCITY: 1.4 M/S
ECHO AV REGURGITANT PHT: 715.1 MILLISECOND
ECHO AV VELOCITY RATIO: 0.57
ECHO BSA: 2.27 M2
ECHO LA DIAMETER INDEX: 2.27 CM/M2
ECHO LA DIAMETER: 5.1 CM
ECHO LA TO AORTIC ROOT RATIO: 1.46
ECHO LA VOL A-L A2C: 65 ML (ref 18–58)
ECHO LA VOL A-L A4C: 82 ML (ref 18–58)
ECHO LA VOL BP: 70 ML (ref 18–58)
ECHO LA VOL MOD A2C: 63 ML (ref 18–58)
ECHO LA VOL MOD A4C: 75 ML (ref 18–58)
ECHO LA VOL/BSA BIPLANE: 31 ML/M2 (ref 16–34)
ECHO LA VOLUME AREA LENGTH: 75 ML
ECHO LA VOLUME INDEX A-L A2C: 29 ML/M2 (ref 16–34)
ECHO LA VOLUME INDEX A-L A4C: 36 ML/M2 (ref 16–34)
ECHO LA VOLUME INDEX AREA LENGTH: 33 ML/M2 (ref 16–34)
ECHO LA VOLUME INDEX MOD A2C: 28 ML/M2 (ref 16–34)
ECHO LA VOLUME INDEX MOD A4C: 33 ML/M2 (ref 16–34)
ECHO LV E' LATERAL VELOCITY: 15 CM/S
ECHO LV E' SEPTAL VELOCITY: 8 CM/S
ECHO LV EDV A2C: 73 ML
ECHO LV EDV A4C: 77 ML
ECHO LV EDV BP: 75 ML (ref 67–155)
ECHO LV EDV INDEX A4C: 34 ML/M2
ECHO LV EDV INDEX BP: 33 ML/M2
ECHO LV EDV NDEX A2C: 32 ML/M2
ECHO LV EJECTION FRACTION A2C: 54 %
ECHO LV EJECTION FRACTION A4C: 36 %
ECHO LV EJECTION FRACTION BIPLANE: 44 % (ref 55–100)
ECHO LV ESV A2C: 33 ML
ECHO LV ESV A4C: 50 ML
ECHO LV ESV BP: 42 ML (ref 22–58)
ECHO LV ESV INDEX A2C: 15 ML/M2
ECHO LV ESV INDEX A4C: 22 ML/M2
ECHO LV ESV INDEX BP: 19 ML/M2
ECHO LV FRACTIONAL SHORTENING: 22 % (ref 28–44)
ECHO LV INTERNAL DIMENSION DIASTOLE INDEX: 2.44 CM/M2
ECHO LV INTERNAL DIMENSION DIASTOLIC: 5.5 CM (ref 4.2–5.9)
ECHO LV INTERNAL DIMENSION SYSTOLIC INDEX: 1.91 CM/M2
ECHO LV INTERNAL DIMENSION SYSTOLIC: 4.3 CM
ECHO LV IVSD: 0.9 CM (ref 0.6–1)
ECHO LV MASS 2D: 185.8 G (ref 88–224)
ECHO LV MASS INDEX 2D: 82.6 G/M2 (ref 49–115)
ECHO LV POSTERIOR WALL DIASTOLIC: 0.9 CM (ref 0.6–1)
ECHO LV RELATIVE WALL THICKNESS RATIO: 0.33
ECHO LVOT AREA: 3.5 CM2
ECHO LVOT DIAM: 2.1 CM
ECHO LVOT MEAN GRADIENT: 2 MMHG
ECHO LVOT PEAK GRADIENT: 3 MMHG
ECHO LVOT PEAK VELOCITY: 0.8 M/S
ECHO LVOT STROKE VOLUME INDEX: 27.5 ML/M2
ECHO LVOT SV: 62 ML
ECHO LVOT VTI: 17.9 CM
ECHO MV A VELOCITY: 0.38 M/S
ECHO MV E DECELERATION TIME (DT): 230.8 MS
ECHO MV E VELOCITY: 0.77 M/S
ECHO MV E/A RATIO: 2.03
ECHO MV E/E' LATERAL: 5.13
ECHO MV E/E' RATIO (AVERAGED): 7.38
ECHO MV REGURGITANT PEAK GRADIENT: 81 MMHG
ECHO MV REGURGITANT PEAK VELOCITY: 4.5 M/S
ECHO PV MAX VELOCITY: 0.8 M/S
ECHO PV PEAK GRADIENT: 3 MMHG
ECHO RV FREE WALL PEAK S': 17 CM/S
ECHO RV INTERNAL DIMENSION: 6.8 CM
ECHO RV TAPSE: 1.7 CM (ref 1.7–?)
ECHO TV REGURGITANT MAX VELOCITY: 2.72 M/S
ECHO TV REGURGITANT PEAK GRADIENT: 30 MMHG
ERYTHROCYTE [DISTWIDTH] IN BLOOD BY AUTOMATED COUNT: 14.3 % (ref 11.5–14.5)
HCT VFR BLD AUTO: 47.4 % (ref 36.6–50.3)
HGB BLD-MCNC: 15.9 G/DL (ref 12.1–17)
MCH RBC QN AUTO: 28.8 PG (ref 26–34)
MCHC RBC AUTO-ENTMCNC: 33.5 G/DL (ref 30–36.5)
MCV RBC AUTO: 85.9 FL (ref 80–99)
NRBC # BLD: 0 K/UL (ref 0–0.01)
NRBC BLD-RTO: 0 PER 100 WBC
PLATELET # BLD AUTO: 141 K/UL (ref 150–400)
PMV BLD AUTO: 9.2 FL (ref 8.9–12.9)
RBC # BLD AUTO: 5.52 M/UL (ref 4.1–5.7)
WBC # BLD AUTO: 5.1 K/UL (ref 4.1–11.1)

## 2024-04-23 PROCEDURE — 99232 SBSQ HOSP IP/OBS MODERATE 35: CPT | Performed by: PSYCHIATRY & NEUROLOGY

## 2024-04-23 PROCEDURE — 2580000003 HC RX 258: Performed by: HOSPITALIST

## 2024-04-23 PROCEDURE — 6360000002 HC RX W HCPCS: Performed by: HOSPITALIST

## 2024-04-23 PROCEDURE — 85027 COMPLETE CBC AUTOMATED: CPT

## 2024-04-23 PROCEDURE — 93306 TTE W/DOPPLER COMPLETE: CPT

## 2024-04-23 PROCEDURE — 97116 GAIT TRAINING THERAPY: CPT

## 2024-04-23 PROCEDURE — 36415 COLL VENOUS BLD VENIPUNCTURE: CPT

## 2024-04-23 PROCEDURE — 93306 TTE W/DOPPLER COMPLETE: CPT | Performed by: INTERNAL MEDICINE

## 2024-04-23 PROCEDURE — 94761 N-INVAS EAR/PLS OXIMETRY MLT: CPT

## 2024-04-23 PROCEDURE — 6370000000 HC RX 637 (ALT 250 FOR IP): Performed by: HOSPITALIST

## 2024-04-23 RX ORDER — ATORVASTATIN CALCIUM 80 MG/1
80 TABLET, FILM COATED ORAL NIGHTLY
Qty: 30 TABLET | Refills: 1 | Status: SHIPPED | OUTPATIENT
Start: 2024-04-23

## 2024-04-23 RX ORDER — MECLIZINE HCL 12.5 MG/1
12.5 TABLET ORAL 3 TIMES DAILY PRN
Qty: 30 TABLET | Refills: 0 | Status: SHIPPED | OUTPATIENT
Start: 2024-04-23 | End: 2024-05-03

## 2024-04-23 RX ADMIN — FLUTICASONE PROPIONATE 2 SPRAY: 50 SPRAY, METERED NASAL at 08:47

## 2024-04-23 RX ADMIN — PANTOPRAZOLE SODIUM 40 MG: 40 TABLET, DELAYED RELEASE ORAL at 06:23

## 2024-04-23 RX ADMIN — SODIUM CHLORIDE, PRESERVATIVE FREE 10 ML: 5 INJECTION INTRAVENOUS at 08:47

## 2024-04-23 RX ADMIN — SODIUM CHLORIDE: 9 INJECTION, SOLUTION INTRAVENOUS at 06:27

## 2024-04-23 RX ADMIN — DICYCLOMINE HYDROCHLORIDE 10 MG: 10 CAPSULE ORAL at 08:46

## 2024-04-23 RX ADMIN — ENOXAPARIN SODIUM 30 MG: 100 INJECTION SUBCUTANEOUS at 08:47

## 2024-04-23 NOTE — DISCHARGE INSTRUCTIONS
privilege to operate a motor vehicle will be suspended for six months. This six-month suspension period may be shortened if [facility] receives information from the 's health care provider indicating that the  has fully recovered. These cases may be referred to the [facility] Medical Advisory Board for its guidance and recommendations.    Following the six-month suspension, [facility] will request an evaluation with a certified  rehabilitation specialist if the  has suffered paralysis or cognitive changes due to the CVA. If the ’s physical and cognitive information is not indicated in the medical report received by the agency, [facility] will request it from the health care provider.    ([email or web address]).         Based on the information received about the ’s cognitive abilities, the  may also be subject to the DMV.  The  is also required to furnish an updated Vision Report with an examination date that is not older than 90 days and occurs after the TIA/CVA, in accordance with [place] Code Section 46[place]2-311.     [facility] may impose additional requirements on the individual depending on the information received by the agency.    [email or web address]            Otis Rivas MD     April 23, 2024

## 2024-04-23 NOTE — CARE COORDINATION
11:36 AM  Met with patient and family after team, anticipation of discharge today.  Balance is better from their perspective, he has also ambulated stairs this am with PT.  Advised to follow up with an ENT per Dr. Rivas statement in IDR this am.  Outpatient followup.     9:30am   04/23/24 0927   Service Assessment   Patient Orientation Alert and Oriented   Cognition Alert   History Provided By Patient;Spouse;Medical Record   Primary Caregiver Self   Support Systems Family Members   Patient's Healthcare Decision Maker is: Legal Next of Kin   Prior Functional Level Independent in ADLs/IADLs   Current Functional Level Independent in ADLs/IADLs   Can patient return to prior living arrangement Yes   Ability to make needs known: Good   Family able to assist with home care needs: Yes   Financial Resources Medicare   Social/Functional History   Lives With Spouse   Type of Home House   Home Layout Two level  (can live on the main floor)   Home Access Stairs to enter without rails   Entrance Stairs - Number of Steps 4   Home Equipment Cane;Walker, rolling   Receives Help From Other (comment)  (has not needed, but can get asst from spouse if needed.)   Active  Yes   Discharge Planning   Patient expects to be discharged to: House   Services At/After Discharge   Confirm Follow Up Transport Family     Patient lives with his wife in a home where he can remain on the first floor if needed, has not had or needed HH in the past, works out, independent and driving.  Therapy recommending outpatient at this time, no further CM needs noted.

## 2024-04-23 NOTE — DISCHARGE SUMMARY
Date: 4/3/2024  Shielding: Shielded. PA, Lat.     AP and lateral views of the hand today were personally interpreted and demonstrate no change in alignment of the fracture. Interval healing is present.        Vitals:    04/23/24 1115   BP: (!) 159/75   Pulse: 60   Resp: 18   Temp: 97.3 °F (36.3 °C)   SpO2: 100%       Physical Exam:  General: alert, well appearing, and no distress  Head: Normocephalic, without obvious abnormality, atraumatic  Eyes: anicteric sclerae and conjuntiva clear  ENT: lips, mucosa, and tongue normal  Neck: normal, supple, and no tenderness  Lungs: clear to auscultation with good breath sounds and normal respiratory effort  Heart: S1, S2 normal, regular rate, and regular rhythm  Abd: not distended, soft, nontender, BS present and normactive  Ext: no cyanosis and no edema except for dorsal aspect of right hand.  Skin: normal skin color, no rashes, and texture normal  Neuro:  Alert, oriented x 4, and Cranial nerves 3-12 grossly intact  Psych: not anxious, cooperative, appropriate affect      Labs:  Recent Labs     04/23/24  0156   WBC 5.1   HGB 15.9   HCT 47.4   *     Recent Labs     04/22/24  0301      K 4.3      CO2 29   BUN 16   ALT 17           PATIENT INSTRUCTIONS     Activity: Activity as tolerated but no driving for at least 3 months because of DMV stroke/TIA rules.    Diet: cardiac diet ADULT DIET; Regular; Low Fat/Low Chol/High Fiber/2 gm Na; Low Sodium (2 gm)     Wound Care:  None    Follow-up(s):  Follow-up Information       Follow up With Specialties Details Why Contact Info    Jarvis Clements MD Internal Medicine Follow up in 1 week(s)  [address] [facility] [address]  [facility] [address] [phone]      Jarvis Clements MD Internal Medicine   [address] [address] [phone]      Fernando Ornelas MD Otolaryngology Call on 7/2/2024 They can get you in July 2nd no sooner  every 6 hours as needed for Wheezing      Cyanocobalamin (VITAMIN B 12) 500 MCG TABS Take 2,000 mcg by mouth daily lozenges      Probiotic Product (PROBIOTIC-10 PO) Take 1 capsule by mouth daily      lisinopril (PRINIVIL;ZESTRIL) 2.5 MG tablet Take 1 tablet by mouth daily  Qty: 90 tablet, Refills: 3      KRILL OIL PO Take 1 capsule by mouth daily      acetaminophen (TYLENOL) 500 MG tablet Take by mouth 3 times daily as needed      diclofenac sodium (VOLTAREN) 1 % GEL as needed      loratadine (CLARITIN REDITABS) 10 MG dissolvable tablet Take by mouth daily      Melatonin 5 MG CAPS Take by mouth nightly as needed      silodosin (RAPAFLO) 8 MG CAPS Take by mouth daily (with breakfast)      tadalafil (CIALIS) 5 MG tablet Take 1 tablet by mouth every evening           STOP taking these medications       aspirin 81 MG EC tablet Comments:   Reason for Stopping:         clopidogrel (PLAVIX) 75 MG tablet Comments:   Reason for Stopping:               Discharge Plan D/W:  Patient, RN, and Care Manager        **Total time spent coordinating discharge (preparing & going over instructions, follow-ups, prescriptions, and documentation):  45 minutes                 _____________________________________    Signed:   Otis Rivas MD   Date of service:    4/23/2024         CC: Jarvis Clements MD

## 2024-04-23 NOTE — PROGRESS NOTES
INPATIENT NEUROLOGY FOLLOW-UP NOTE    NAME:  Basilio Zuluaga  MRN:  [id number]  : 1945      ADMIT DATE:   2024  2:52 AM  REASON FOR FOLLOW UP: dizziness    See Neurology Consult dated 24 for full details    Brief Hx (From Neurology Consult Impression/ Plan):  78 y.o. male with PMHx A Fib, right and left Ophthalmic artery strokes, Pacemaker, Watchman device.  On Plavix 75 mg /day, ASA 81 mg/ day, Lipitor 40 mg QHS.  Recent episodes of brief/ few seconds of dizziness/ unsteady gait when standing.  Denies any spinning/ vertigo.  Reports hx of tinnitus.  Orthostatic VS by PT/ OT are normal.  CT Head w/o any acute abnormality  CTA Neck is normal.  CTA Head shows: 1)  CTA Head: No large vessel occlusion. At least moderate to severe stenosis of supraclinoid left ICA, suboptimally evaluated due to calcified plaques. 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  Pt cannot have MRI due to retained/ old pacemaker leads.  D/w patient and daughter the differential dx for his symptoms: BPPV (less likely as no vertigo/ spinning) vs other peripheral vestibulopathy (hx of tinnitus, ? Meniere's) vs posterior circulation TIA or CVA.  D/w them that I think posterior circulation TIA or CVA is more likely due to him having the intracranial left vertebral artery narrowing (hypoplastic vs long-segment stenosis).  Discussed that in light of hx A Fib and these symptoms while on ASA+Plavix, my recommendation is for patient to restart the Eliquis (pt denies any side effects when he was on it) to reduce chance of future TIA/ CVA.  Consider Cardiology Consult for their thoughts.  If he is put back on Eliquis/ anticoagulation, then from a Neurology standpoint, he does not need to be on an antiplatelet medication (ASA or Plavix). However, if Cardiology feels pt has cardiac indication to be on an antiplatelet medication while on anticoagulant then I will defer that issue to them.  Continues to see  Rash    Penicillins Rash     States he is able to tolerate cephalexin with no adverse reaction      Sulfa Antibiotics Rash       INPATIENT MEDS    Current Facility-Administered Medications:     apixaban (ELIQUIS) tablet 5 mg, 5 mg, Oral, BID, Otis Rivas MD    ondansetron (ZOFRAN) injection 4 mg, 4 mg, IntraVENous, Q6H PRN, Rubi Chapa MD, 4 mg at 04/22/24 0956    sodium chloride flush 0.9 % injection 5-40 mL, 5-40 mL, IntraVENous, 2 times per day, Sadie Barr MD, 10 mL at 04/23/24 0847    sodium chloride flush 0.9 % injection 5-40 mL, 5-40 mL, IntraVENous, PRN, Sadie Barr MD    0.9 % sodium chloride infusion, , IntraVENous, PRN, Sadie Barr MD, Last Rate: 100 mL/hr at 04/23/24 0627, New Bag at 04/23/24 0627    polyethylene glycol (GLYCOLAX) packet 17 g, 17 g, Oral, Daily PRN, Sadie Barr MD    atorvastatin (LIPITOR) tablet 80 mg, 80 mg, Oral, Nightly, Sadie Barr MD, 80 mg at 04/22/24 2002    labetalol (NORMODYNE;TRANDATE) injection 10 mg, 10 mg, IntraVENous, Q10 Min PRN, Sadie Barr MD    meclizine (ANTIVERT) tablet 12.5 mg, 12.5 mg, Oral, TID PRN, Sadie Barr MD    diclofenac sodium (VOLTAREN) 1 % gel 2 g, 2 g, Topical, 4x Daily PRN, Saide Barr MD    dicyclomine (BENTYL) capsule 10 mg, 10 mg, Oral, BID, Sadie Barr MD, 10 mg at 04/23/24 0846    fluticasone (FLONASE) 50 MCG/ACT nasal spray 2 spray, 2 spray, Each Nostril, Daily, Sadie Barr MD, 2 spray at 04/23/24 0847    melatonin tablet 4.5 mg, 4.5 mg, Oral, Nightly PRN, Sadie Barr MD, 4.5 mg at 04/22/24 2002    pantoprazole (PROTONIX) tablet 40 mg, 40 mg, Oral, QAM AC, Sadie Barr MD, 40 mg at 04/23/24 0623    tadalafil (CIALIS) tablet 5 mg (Patient Supplied), 5 mg, Oral, QPM, Sadie Barr MD      =====================================    MEDICAL DECISION MAKING (2 of 3: A +/- B +/- C)    A) Number and Complexity of Problem Addressed:  [] 1 stable, acute illness (Low)  [] 1 stable, chronic illness

## 2024-04-23 NOTE — PLAN OF CARE
Problem: Physical Therapy - Adult  Goal: By Discharge: Performs mobility at highest level of function for planned discharge setting.  See evaluation for individualized goals.  Description: FUNCTIONAL STATUS PRIOR TO ADMISSION: Patient was independent and active without use of DME. Pt enjoys going to water aerobics, balance class x 1 week    HOME SUPPORT PRIOR TO ADMISSION: The patient lived with spouse but did not require assistance.    Physical Therapy Goals  Initiated 4/22/2024  1.  Patient will move from supine to sit and sit to supine and roll side to side in bed with independence within 7 day(s).    2.  Patient will perform sit to stand with modified independence within 7 day(s).  3.  Patient will transfer from bed to chair and chair to bed with modified independence using the least restrictive device within 7 day(s).  4.  Patient will ambulate with modified independence for 200 feet with the least restrictive device within 7 day(s).   5.  Patient will ascend/descend 5 stairs with single handrail(s) with supervision/set-up within 7 day(s).  6.  Patient will improve Baires Balance score by 7 points within 7 days.    Outcome: Progressing     PHYSICAL THERAPY TREATMENT    Patient: Basilio Zuluaga (78 y.o. male)  Date: 4/23/2024  Diagnosis: Dizziness [R42]  Vertigo [R42]  Ambulatory dysfunction [R26.2] Dizziness      Precautions: Fall Risk (recent R hand fx with splint)                      ASSESSMENT:  Patient continues to benefit from skilled PT services and is progressing towards goals. He reports feeling back to baseline. He demonstrates improved gait ambulating the hallways but with ongoing gait path deviations which he states is baseline and family confirms. No overt losses of balance today though. He navigated stairs safely without difficulty. He scores 24/28 on Tinetti Balance Test today which does indicate fall risk. Reviewed home safety recommendations with patient and family. Continue to recommend

## 2024-04-29 ENCOUNTER — PROCEDURE VISIT (OUTPATIENT)
Age: 79
End: 2024-04-29
Payer: MEDICARE

## 2024-04-29 ENCOUNTER — OFFICE VISIT (OUTPATIENT)
Age: 79
End: 2024-04-29
Payer: MEDICARE

## 2024-04-29 VITALS
BODY MASS INDEX: 28.23 KG/M2 | OXYGEN SATURATION: 95 % | RESPIRATION RATE: 16 BRPM | TEMPERATURE: 97.7 F | SYSTOLIC BLOOD PRESSURE: 111 MMHG | DIASTOLIC BLOOD PRESSURE: 66 MMHG | HEIGHT: 74 IN | WEIGHT: 220 LBS | HEART RATE: 60 BPM

## 2024-04-29 VITALS
BODY MASS INDEX: 27.85 KG/M2 | HEART RATE: 64 BPM | SYSTOLIC BLOOD PRESSURE: 138 MMHG | WEIGHT: 217 LBS | HEIGHT: 74 IN | DIASTOLIC BLOOD PRESSURE: 70 MMHG

## 2024-04-29 DIAGNOSIS — R42 DIZZINESS: ICD-10-CM

## 2024-04-29 DIAGNOSIS — Z09 HOSPITAL DISCHARGE FOLLOW-UP: Primary | ICD-10-CM

## 2024-04-29 DIAGNOSIS — Z95.818 PRESENCE OF WATCHMAN LEFT ATRIAL APPENDAGE CLOSURE DEVICE: ICD-10-CM

## 2024-04-29 DIAGNOSIS — Z95.0 CARDIAC PACEMAKER IN SITU: ICD-10-CM

## 2024-04-29 DIAGNOSIS — I48.20 CHRONIC ATRIAL FIBRILLATION (HCC): Primary | ICD-10-CM

## 2024-04-29 DIAGNOSIS — Z79.01 ANTICOAGULATION ADEQUATE: ICD-10-CM

## 2024-04-29 DIAGNOSIS — E78.5 DYSLIPIDEMIA: ICD-10-CM

## 2024-04-29 DIAGNOSIS — I48.21 PERMANENT ATRIAL FIBRILLATION (HCC): ICD-10-CM

## 2024-04-29 DIAGNOSIS — I49.5 BRADY-TACHY SYNDROME (HCC): ICD-10-CM

## 2024-04-29 DIAGNOSIS — I77.9 BILATERAL CAROTID ARTERY DISEASE, UNSPECIFIED TYPE (HCC): ICD-10-CM

## 2024-04-29 DIAGNOSIS — Z95.0 CARDIAC PACEMAKER IN SITU: Primary | ICD-10-CM

## 2024-04-29 DIAGNOSIS — E78.5 DYSLIPIDEMIA: Primary | ICD-10-CM

## 2024-04-29 PROBLEM — D69.6 THROMBOCYTOPENIA (HCC): Status: RESOLVED | Noted: 2020-02-18 | Resolved: 2024-04-29

## 2024-04-29 PROCEDURE — G8419 CALC BMI OUT NRM PARAM NOF/U: HCPCS | Performed by: INTERNAL MEDICINE

## 2024-04-29 PROCEDURE — 99214 OFFICE O/P EST MOD 30 MIN: CPT | Performed by: INTERNAL MEDICINE

## 2024-04-29 PROCEDURE — 99214 OFFICE O/P EST MOD 30 MIN: CPT | Performed by: SPECIALIST

## 2024-04-29 PROCEDURE — G8427 DOCREV CUR MEDS BY ELIG CLIN: HCPCS | Performed by: SPECIALIST

## 2024-04-29 PROCEDURE — 1123F ACP DISCUSS/DSCN MKR DOCD: CPT | Performed by: INTERNAL MEDICINE

## 2024-04-29 PROCEDURE — 93288 INTERROG EVL PM/LDLS PM IP: CPT | Performed by: INTERNAL MEDICINE

## 2024-04-29 PROCEDURE — G8419 CALC BMI OUT NRM PARAM NOF/U: HCPCS | Performed by: SPECIALIST

## 2024-04-29 PROCEDURE — 1036F TOBACCO NON-USER: CPT | Performed by: INTERNAL MEDICINE

## 2024-04-29 PROCEDURE — G8428 CUR MEDS NOT DOCUMENT: HCPCS | Performed by: INTERNAL MEDICINE

## 2024-04-29 PROCEDURE — 1036F TOBACCO NON-USER: CPT | Performed by: SPECIALIST

## 2024-04-29 PROCEDURE — 1123F ACP DISCUSS/DSCN MKR DOCD: CPT | Performed by: SPECIALIST

## 2024-04-29 PROCEDURE — 1111F DSCHRG MED/CURRENT MED MERGE: CPT | Performed by: SPECIALIST

## 2024-04-29 PROCEDURE — 1111F DSCHRG MED/CURRENT MED MERGE: CPT | Performed by: INTERNAL MEDICINE

## 2024-04-29 RX ORDER — MULTIVIT-MIN/IRON/FOLIC ACID/K 18-600-40
CAPSULE ORAL
COMMUNITY

## 2024-04-29 ASSESSMENT — ENCOUNTER SYMPTOMS
DIARRHEA: 0
CONSTIPATION: 0
ABDOMINAL PAIN: 0
WHEEZING: 0
BACK PAIN: 0
SHORTNESS OF BREATH: 0

## 2024-04-29 NOTE — PROGRESS NOTES
(BENTYL) 10 MG capsule TAKE 1 CAPSULE TWICE A DAY    albuterol sulfate HFA (VENTOLIN HFA) 108 (90 Base) MCG/ACT inhaler Inhale 2 puffs into the lungs every 6 hours as needed for Wheezing    Cyanocobalamin (VITAMIN B 12) 500 MCG TABS Take 2,000 mcg by mouth daily lozenges    Probiotic Product (PROBIOTIC-10 PO) Take 1 capsule by mouth daily    lisinopril (PRINIVIL;ZESTRIL) 2.5 MG tablet Take 1 tablet by mouth daily    KRILL OIL PO Take 1 capsule by mouth daily    acetaminophen (TYLENOL) 500 MG tablet Take by mouth 3 times daily as needed    diclofenac sodium (VOLTAREN) 1 % GEL as needed    loratadine (CLARITIN REDITABS) 10 MG dissolvable tablet Take by mouth daily    Melatonin 5 MG CAPS Take by mouth nightly as needed    silodosin (RAPAFLO) 8 MG CAPS Take by mouth daily (with breakfast)    tadalafil (CIALIS) 5 MG tablet Take 1 tablet by mouth every evening     No current facility-administered medications for this visit.        Past Medical History:   Diagnosis Date    AR (allergic rhinitis) 02/11/2009    Atrial fibrillation (HCC) 2003 to present    BPH (benign prostatic hypertrophy)     Aron-tachy syndrome (HCC) 03/27/2012    CAD (coronary artery disease)     Colon polyp 02/11/2009    COVID-19 12/01/2022    Dyslipidemia 06/05/2014    GERD (gastroesophageal reflux disease) fall 2013    OA (osteoarthritis) of knee 02/11/2009    Pacemaker     PSA elevation 07/2017    Skin moles 02/11/2009    Stroke ([facility]) 2015    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    Thyroid nodule 02/11/2009      Past Surgical History:   Procedure Laterality Date    CARDIAC PROCEDURE N/A 10/25/2023    Intracardiac echocardiogram performed by Sanjuana Moses MD at [facility] CARDIAC CATH LAB    COLONOSCOPY      COLONOSCOPY N/A 10/21/2019    COLONOSCOPY- Check for device orders performed by Lawson Correia MD at [facility] ENDOSCOPY    ECHO 2D ADULT  8/15/11    EF 55%, biatrial enlargement, mild MR    EP DEVICE PROCEDURE N/A 10/25/2023    Watchman paolo closure device

## 2024-04-29 NOTE — PROGRESS NOTES
/70   Pulse 64   Ht 1.88 m (6' 2\")   Wt 98.4 kg (217 lb)   BMI 27.86 kg/m²     NAME Basilio Zuluaga         1945      MRN    [id number]      LAST OFFICE APPOINTMENT: Visit date not found     DIAGNOSIS    ICD-10-CM    1. Chronic atrial fibrillation (HCC)  I48.20       2. Anticoagulation adequate  Z79.01       3. Presence of Watchman left atrial appendage closure device  Z95.818       4. Aron-tachy syndrome (HCC)  I49.5       5. Bilateral carotid artery disease, unspecified type (HCC)  I77.9       6. Cardiac pacemaker in situ  Z95.0           HOME MEDICATION  Current Outpatient Medications   Medication Sig    Cholecalciferol (VITAMIN D) 50 MCG (2000 UT) CAPS capsule Take by mouth    atorvastatin (LIPITOR) 80 MG tablet Take 1 tablet by mouth nightly    meclizine (ANTIVERT) 12.5 MG tablet Take 1 tablet by mouth 3 times daily as needed for Dizziness    apixaban (ELIQUIS) 5 MG TABS tablet Take 1 tablet by mouth 2 times daily    fluticasone (FLONASE) 50 MCG/ACT nasal spray USE 2 SPRAYS IN EACH NOSTRIL DAILY    omeprazole (PRILOSEC) 20 MG delayed release capsule TAKE 1 CAPSULE DAILY    dicyclomine (BENTYL) 10 MG capsule TAKE 1 CAPSULE TWICE A DAY    albuterol sulfate HFA (VENTOLIN HFA) 108 (90 Base) MCG/ACT inhaler Inhale 2 puffs into the lungs every 6 hours as needed for Wheezing    Cyanocobalamin (VITAMIN B 12) 500 MCG TABS Take 2,000 mcg by mouth daily lozenges    Probiotic Product (PROBIOTIC-10 PO) Take 1 capsule by mouth daily    lisinopril (PRINIVIL;ZESTRIL) 2.5 MG tablet Take 1 tablet by mouth daily    KRILL OIL PO Take 1 capsule by mouth daily    acetaminophen (TYLENOL) 500 MG tablet Take by mouth 3 times daily as needed    diclofenac sodium (VOLTAREN) 1 % GEL as needed    Melatonin 5 MG CAPS Take by mouth nightly as needed    silodosin (RAPAFLO) 8 MG CAPS Take by mouth daily (with breakfast)    tadalafil (CIALIS) 5 MG tablet Take 1 tablet by mouth every evening    metoprolol succinate (TOPROL XL) 
stool  Genitourinary: Denies hematuria, dysuria, increased urinary frequency  Musculoskeletal: Denies joint pain or swelling from muscles or joints  Neurologic: Denies tremor, paresthesias, headache, or sensory motor disturbance  Psychiatric: Denies confusion, insomnia, depression  Integumentray: Denies rash, itching or ulcers.  Hematologic: Denies easy bruising, bleeding     PHYSICAL EXAMINATION      Vitals:    04/29/24 1025   BP: 138/70   Pulse: 64   Weight: 98.4 kg (217 lb)   Height: 1.88 m (6' 2\")         General: Well developed, in no acute distress.  HEENT: No jaundice  Neck: Supple, no stiffness  Heart: Regular rate and rhythm  Respiratory: Clear bilaterally x 4, no wheezing or rales  Extremities:  No edema, normal cap refill, no cyanosis.  Musculoskeletal: No clubbing, no deformities  Neuro: A&Ox3, speech clear, gait stable, cooperative, no focal neurologic deficits  Skin: Skin color is normal. No rashes or lesions. Non diaphoretic, moist.         DIAGNOSTIC DATA     1. Stress Test   ETT April 2012 - 8 min, resting HR 81, peak    Exe Cardiolite 2/5/20 - 9:01. Normal perfusion.      2. Carotid duplex    8/17/2012 - 10-49% bilateral   4/15/14 - 10-49% plaque bilateral, unchanged   6/8/15: 10-49% bilateral stenosis; unchanged.   6/24/16 - 10-49% bilaterally, unchanged   4/18/17 - 10-49% bilaterally, unchanged.   7/18/19 - 10-49% bilateral, unchanged from 4/18/17 12/23/22- <50% mallory      3. Echo   3/4/13: EF 55-60%; LA: moderately dilated; MV and TV: mild regurg.; no sig. hange from previous echo   GORGE 4/17/2014 - no intracardiac mass/thrombus or shunt, mild to moderate aortic arch plaque, EF 50%, moderate LAE, mild MR/AR.   12/21/15 - EF 65 %. No WMA. Moderate BRYN. Mild TR. Mild pulmonary HTN with PASP 38mmHg.    2/5/20 - EF 60-65%. No WMA. Mod LAE. Mild dilated RA. Mild AoV sclerosis without AS. Mild AR. Mild TR. No Pulm HTN. Trace MR.   1/27/22- EF 60-65%, mod LVH, BRYN, AV tricuspid, mild AI, AV cusps

## 2024-05-14 ENCOUNTER — OFFICE VISIT (OUTPATIENT)
Age: 79
End: 2024-05-14

## 2024-05-14 VITALS
WEIGHT: 214.4 LBS | SYSTOLIC BLOOD PRESSURE: 120 MMHG | HEIGHT: 74 IN | HEART RATE: 61 BPM | BODY MASS INDEX: 27.52 KG/M2 | OXYGEN SATURATION: 61 % | DIASTOLIC BLOOD PRESSURE: 72 MMHG | TEMPERATURE: 97.5 F

## 2024-05-14 DIAGNOSIS — R42 DIZZINESS: Primary | ICD-10-CM

## 2024-05-14 RX ORDER — MECLIZINE HCL 12.5 MG/1
12.5 TABLET ORAL 3 TIMES DAILY PRN
COMMUNITY

## 2024-05-14 NOTE — PROGRESS NOTES
New patient referred by [facility] presenting with Left ICA Stenosis.  Spouse and son in law at visit.  Patient reports 5 episodes of Vertigo and nausea in the past week.  Vertigo occurs when patient is lying down and turns his head to the right.  Denies neck pain or stiffness,dizziness or unsteady gait.  No new problems voiced.

## 2024-05-14 NOTE — PROGRESS NOTES
New Patient Visit         CONSULT INFORMATION:  Date of Service: 2024  Consultation Requested by: hospital    PATIENT IDENTIFICATION:  Patient Name: Basilio Zuluaga  : 1945      CC: stenosis    HISTORY OF PRESENT ILLNESS:  78 y.o. RH White (non-) [1]male referred for intracranial stenosis. Late April had two days of dizziness and gait instability. No other focal neuro sx. He has hx of afib s/p Watchman and pacer. He was not on OAC. MRI could not be done. CTA showed possible stenosis of the supraclinoid left ICA, He wa switched to eliquis and d./c home. He has vertigo and nasuea when he turns his head to the right. He has seen ENT recently.       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    Thyroid nodule 2009       Past Surgical History:   Procedure Laterality Date    CARDIAC PROCEDURE N/A 10/25/2023    Intracardiac echocardiogram performed by Sanjuana Moses MD at [facility] CARDIAC CATH LAB    COLONOSCOPY      COLONOSCOPY N/A 10/21/2019    COLONOSCOPY- Check for device orders performed by Lawson Correia MD at [facility] ENDOSCOPY    ECHO 2D ADULT  8/15/11    EF 55%, biatrial enlargement, mild MR    EP DEVICE PROCEDURE N/A 10/25/2023    Watchman paolo closure device performed by Sanjuana Moses MD at [facility] CARDIAC CATH LAB    EYE SURGERY  2017    Cataract    HEENT  2008    nasal septoplasty    INVASIVE VASCULAR N/A 10/25/2023    Ultrasound guided vascular access performed by Sanjuana Moses MD at [facility] CARDIAC CATH LAB    JOINT

## 2024-05-15 RX ORDER — IPRATROPIUM BROMIDE 21 UG/1
SPRAY, METERED NASAL
Qty: 30 ML | Refills: 7 | Status: SHIPPED | OUTPATIENT
Start: 2024-05-15

## 2024-05-17 ENCOUNTER — TELEPHONE (OUTPATIENT)
Age: 79
End: 2024-05-17

## 2024-05-17 NOTE — TELEPHONE ENCOUNTER
Patient called to ask if you would call. He has questions about the eloquis. He's stated the same on mychart.     Patient # [phone]

## 2024-05-20 ENCOUNTER — TELEPHONE (OUTPATIENT)
Age: 79
End: 2024-05-20

## 2024-05-20 NOTE — TELEPHONE ENCOUNTER
Patient called to speak to Dr. Sosa nurse because he spoke to his cardiologist and mentioned that he was told that he could go off of Eliquis.  Patient wanted Dr. Sosa to let his cardiologist know that he said it was okay to go off of it.    I consulted our nurse and relayed to patient that Dr. Vang is the neurologist that his cardiologist is referring to who needs to make any decisions about Eliquist, as Dr. Vang is the doctor who put him on it at his hospital visit on 4/22/24.    Gave patient the number for Dr. Dhillon's office.

## 2024-05-20 NOTE — TELEPHONE ENCOUNTER
I spoke with him today.  He is somewhat confused.  He had a Watchman device placed in October 2023.  Then I saw him after his 45-day GORGE and he was on Plavix and aspirin.  Then somewhere along the way he got admitted to the hospital for neurologic event and the neurologist stopped his Plavix Dicks and aspirin and put him on Eliquis.  He is calling me asking me if he can come off the Eliquis.  The Eliquis was put on by his neurologist I told him that he needed to talk to them about it.  He said the note was in the chart and I reviewed the chart and the note from the neurologist does not say anything about coming off Eliquis.  Is not even mention about whether or not he should be on Plavix.  I told him that he needs to call the neurologist and the neurologist feels he needs the Plavix then he does stay on Plavix and aspirin based on neurology's comments.    Ovidio Hernández MD        ===View-only below this line===  ----- Message -----  From: Rolanda Bentley LPN  Sent: 5/20/2024   3:10 PM EDT  To: Ovidio Hernández MD  Subject: FW: Blood thinner                                  ----- Message -----  From: Digna Reyes LPN  Sent: 5/20/2024  10:07 AM EDT  To: Rolanda Bentley LPN  Subject: FW: Blood thinner                                  ----- Message -----  From: Basilio Zuluaga  Sent: 5/17/2024   4:06 PM EDT  To: [facility] Clinical Staff  Subject: Blood thinner                                    I think that we may have to get together. You are saying that I should that I should stay on blood thinner now? That I should discuss it with an electo something? Is that Dr Moses? You were the one who recommended I get a Watchman device so that I could get off blood thinners. I thought at the time that you thought it a good course of action for me because I had atrial fibrillation. You took me off of Eliquis in January and put me on a regimen of Plavix and aspirin. Why are you now saying that I should stay on blood

## 2024-06-03 ENCOUNTER — TELEPHONE (OUTPATIENT)
Age: 79
End: 2024-06-03

## 2024-06-03 RX ORDER — LISINOPRIL 2.5 MG/1
2.5 TABLET ORAL DAILY
Qty: 90 TABLET | Refills: 3 | Status: SHIPPED | OUTPATIENT
Start: 2024-06-03

## 2024-06-03 NOTE — TELEPHONE ENCOUNTER
Patient called and has questions about (LIPITOR) and (ELIQUIS)     Basilio  [phone] (Mobile)

## 2024-06-06 ENCOUNTER — OFFICE VISIT (OUTPATIENT)
Age: 79
End: 2024-06-06
Payer: MEDICARE

## 2024-06-06 VITALS
HEIGHT: 74 IN | BODY MASS INDEX: 27.72 KG/M2 | DIASTOLIC BLOOD PRESSURE: 80 MMHG | WEIGHT: 216 LBS | HEART RATE: 62 BPM | SYSTOLIC BLOOD PRESSURE: 134 MMHG | OXYGEN SATURATION: 98 %

## 2024-06-06 DIAGNOSIS — Z95.818 PRESENCE OF WATCHMAN LEFT ATRIAL APPENDAGE CLOSURE DEVICE: ICD-10-CM

## 2024-06-06 DIAGNOSIS — I63.9 CEREBRAL INFARCTION, UNSPECIFIED MECHANISM (HCC): Primary | ICD-10-CM

## 2024-06-06 DIAGNOSIS — I49.5 BRADY-TACHY SYNDROME (HCC): ICD-10-CM

## 2024-06-06 DIAGNOSIS — I48.21 PERMANENT ATRIAL FIBRILLATION (HCC): ICD-10-CM

## 2024-06-06 PROCEDURE — 1036F TOBACCO NON-USER: CPT | Performed by: SPECIALIST

## 2024-06-06 PROCEDURE — 1123F ACP DISCUSS/DSCN MKR DOCD: CPT | Performed by: SPECIALIST

## 2024-06-06 PROCEDURE — G8419 CALC BMI OUT NRM PARAM NOF/U: HCPCS | Performed by: SPECIALIST

## 2024-06-06 PROCEDURE — G8427 DOCREV CUR MEDS BY ELIG CLIN: HCPCS | Performed by: SPECIALIST

## 2024-06-06 PROCEDURE — 99214 OFFICE O/P EST MOD 30 MIN: CPT | Performed by: SPECIALIST

## 2024-06-06 NOTE — PROGRESS NOTES
Chief Complaint   Patient presents with    Other     MED CHECK     Vitals:    06/06/24 1126   BP: 134/80   Site: Left Upper Arm   Position: Sitting   Cuff Size: Medium Adult   Pulse: 62   SpO2: 98%   Weight: 98 kg (216 lb)   Height: 1.88 m (6' 2\")      /80 (Site: Left Upper Arm, Position: Sitting, Cuff Size: Medium Adult)   Pulse 62   Ht 1.88 m (6' 2\")   Wt 98 kg (216 lb)   SpO2 98%   BMI 27.73 kg/m²        
PLAN:     Assessment and Plan:      TIA / CVA  - Has had multiple prior CVA's (including  Dec 2022).  Has previously been on Pradaxa and then Eliquis (for Afib).  Also previously on aspirin and then Plavix.    - S/p Watchman 10/25/23  - Comes in 4/22/24 with TIA / CVA symptoms - has been on DAPT  PTA.  - Neurology wanted to place patient back on DOAC.  I told him OK to switch DAPT to Eliquis (or other DOAC) if Neurology thinks that is best.  No cardiac contraindication to that change.   Dr. Moses (via email) was OK Neurology preference for blood thinners.   - ON 6/6/24 - Recently patient says he saw Dr. Sosa (neuro-intervetionalist) who questioned if patient was having strokes but would not direct him on what blood thinner to take.   - Aspirin or plavix long term is sufficient from a cardiac standpoint now that he is s/p Watchman.    -----> Ms. Ambrosio had initially planned in December for patient to be on Plavix alone long term   - Any one blood thinner is OK from cardiac standpoint depending on what Neurology prefers.    - I asked Mr. Gibson to see Dr. Tamera Lobo to help clarify if patient has been having strokes or not and see what he thinks patient should be on for a blood thinner.   - Unless Dr. Lobo specifically wants to use anti-coagulants, then could just follow EP's prior recs and use plavix alone. ----> would consider checking Plavix Response Assay if we do switch to Plavix in the future.     Chronic atrial fibrillation  - Pacer Dependent  - S/p Watchman 10/25/23  - see above regarding blood thinners      Bradycardia  - S/p pacemaker SJM        See me in October as planned.  Recommend he see Neurology - Dr. Yamil Cotton MD, [facility] [place]  [facility]  [address], [address]  [address]  [place], [place]  [address]  [place] [address]  Ph: [phone]   Ph [phone]

## 2024-06-11 RX ORDER — ATORVASTATIN CALCIUM 40 MG/1
40 TABLET, FILM COATED ORAL EVERY EVENING
Qty: 90 TABLET | Refills: 3 | OUTPATIENT
Start: 2024-06-11

## 2024-06-12 ENCOUNTER — OFFICE VISIT (OUTPATIENT)
Age: 79
End: 2024-06-12
Payer: MEDICARE

## 2024-06-12 VITALS
DIASTOLIC BLOOD PRESSURE: 84 MMHG | HEIGHT: 74 IN | OXYGEN SATURATION: 98 % | TEMPERATURE: 97.7 F | BODY MASS INDEX: 27.72 KG/M2 | HEART RATE: 63 BPM | WEIGHT: 216 LBS | RESPIRATION RATE: 16 BRPM | SYSTOLIC BLOOD PRESSURE: 135 MMHG

## 2024-06-12 DIAGNOSIS — R05.8 POST-VIRAL COUGH SYNDROME: Primary | ICD-10-CM

## 2024-06-12 DIAGNOSIS — E78.5 DYSLIPIDEMIA: ICD-10-CM

## 2024-06-12 DIAGNOSIS — J06.9 UPPER RESPIRATORY TRACT INFECTION, UNSPECIFIED TYPE: ICD-10-CM

## 2024-06-12 LAB
ALBUMIN SERPL-MCNC: 3.6 G/DL (ref 3.5–5)
ALBUMIN/GLOB SERPL: 1.3 (ref 1.1–2.2)
ALP SERPL-CCNC: 145 U/L (ref 45–117)
ALT SERPL-CCNC: 28 U/L (ref 12–78)
ANION GAP SERPL CALC-SCNC: 5 MMOL/L (ref 5–15)
AST SERPL-CCNC: 24 U/L (ref 15–37)
BASOPHILS # BLD: 0.1 K/UL (ref 0–0.1)
BASOPHILS NFR BLD: 1 % (ref 0–1)
BILIRUB DIRECT SERPL-MCNC: 0.4 MG/DL (ref 0–0.2)
BILIRUB SERPL-MCNC: 1.1 MG/DL (ref 0.2–1)
BUN SERPL-MCNC: 17 MG/DL (ref 6–20)
BUN/CREAT SERPL: 16 (ref 12–20)
CALCIUM SERPL-MCNC: 9.1 MG/DL (ref 8.5–10.1)
CHLORIDE SERPL-SCNC: 108 MMOL/L (ref 97–108)
CHOLEST SERPL-MCNC: 121 MG/DL
CO2 SERPL-SCNC: 27 MMOL/L (ref 21–32)
CREAT SERPL-MCNC: 1.05 MG/DL (ref 0.7–1.3)
DIFFERENTIAL METHOD BLD: ABNORMAL
EOSINOPHIL # BLD: 0.1 K/UL (ref 0–0.4)
EOSINOPHIL NFR BLD: 1 % (ref 0–7)
ERYTHROCYTE [DISTWIDTH] IN BLOOD BY AUTOMATED COUNT: 14.8 % (ref 11.5–14.5)
GLOBULIN SER CALC-MCNC: 2.8 G/DL (ref 2–4)
GLUCOSE SERPL-MCNC: 126 MG/DL (ref 65–100)
HCT VFR BLD AUTO: 47.5 % (ref 36.6–50.3)
HDLC SERPL-MCNC: 43 MG/DL
HDLC SERPL: 2.8 (ref 0–5)
HGB BLD-MCNC: 15.8 G/DL (ref 12.1–17)
IMM GRANULOCYTES # BLD AUTO: 0 K/UL (ref 0–0.04)
IMM GRANULOCYTES NFR BLD AUTO: 1 % (ref 0–0.5)
LDLC SERPL CALC-MCNC: 63.4 MG/DL (ref 0–100)
LYMPHOCYTES # BLD: 1.2 K/UL (ref 0.8–3.5)
LYMPHOCYTES NFR BLD: 20 % (ref 12–49)
MCH RBC QN AUTO: 28.9 PG (ref 26–34)
MCHC RBC AUTO-ENTMCNC: 33.3 G/DL (ref 30–36.5)
MCV RBC AUTO: 86.8 FL (ref 80–99)
MONOCYTES # BLD: 0.4 K/UL (ref 0–1)
MONOCYTES NFR BLD: 7 % (ref 5–13)
NEUTS SEG # BLD: 4.3 K/UL (ref 1.8–8)
NEUTS SEG NFR BLD: 70 % (ref 32–75)
NRBC # BLD: 0 K/UL (ref 0–0.01)
NRBC BLD-RTO: 0 PER 100 WBC
PLATELET # BLD AUTO: 205 K/UL (ref 150–400)
PMV BLD AUTO: 9.6 FL (ref 8.9–12.9)
POTASSIUM SERPL-SCNC: 4.4 MMOL/L (ref 3.5–5.1)
PROT SERPL-MCNC: 6.4 G/DL (ref 6.4–8.2)
RBC # BLD AUTO: 5.47 M/UL (ref 4.1–5.7)
SODIUM SERPL-SCNC: 140 MMOL/L (ref 136–145)
TRIGL SERPL-MCNC: 73 MG/DL
VLDLC SERPL CALC-MCNC: 14.6 MG/DL
WBC # BLD AUTO: 6 K/UL (ref 4.1–11.1)

## 2024-06-12 PROCEDURE — 99213 OFFICE O/P EST LOW 20 MIN: CPT | Performed by: INTERNAL MEDICINE

## 2024-06-12 PROCEDURE — 1123F ACP DISCUSS/DSCN MKR DOCD: CPT | Performed by: INTERNAL MEDICINE

## 2024-06-12 PROCEDURE — G8419 CALC BMI OUT NRM PARAM NOF/U: HCPCS | Performed by: INTERNAL MEDICINE

## 2024-06-12 PROCEDURE — G8428 CUR MEDS NOT DOCUMENT: HCPCS | Performed by: INTERNAL MEDICINE

## 2024-06-12 PROCEDURE — 1036F TOBACCO NON-USER: CPT | Performed by: INTERNAL MEDICINE

## 2024-06-12 RX ORDER — PREDNISONE 20 MG/1
TABLET ORAL
Qty: 12 TABLET | Refills: 0 | Status: SHIPPED | OUTPATIENT
Start: 2024-06-12

## 2024-06-12 RX ORDER — BENZONATATE 200 MG/1
200 CAPSULE ORAL 3 TIMES DAILY PRN
Qty: 20 CAPSULE | Refills: 0 | Status: SHIPPED | OUTPATIENT
Start: 2024-06-12 | End: 2024-06-19

## 2024-06-12 ASSESSMENT — ENCOUNTER SYMPTOMS
APNEA: 0
STRIDOR: 0
WHEEZING: 0
CHOKING: 0
ABDOMINAL PAIN: 0
BACK PAIN: 0
CONSTIPATION: 0
DIARRHEA: 0
SHORTNESS OF BREATH: 0
COUGH: 1
CHEST TIGHTNESS: 0

## 2024-06-12 NOTE — PROGRESS NOTES
Basilio Zuluaga is a 79 y.o. male who presents today for Cough (Ongoing for couple of weeks; deep cough; runny nose; coughing up yellow/ clear mucus; worsening at night )  .      He has a history of   Patient Active Problem List   Diagnosis    Excess skin of eyelid    Gastroesophageal reflux disease without esophagitis    OA (osteoarthritis) of knee    Scoliosis    Elevated Lp(a)    Irritable bowel syndrome with diarrhea    Dyslipidemia    Cerebral infarction (HCC)    Thyroid nodule    Cardiac pacemaker in situ    Colon polyp    BPH (benign prostatic hyperplasia)    Aron-tachy syndrome (HCC)    Atrial fibrillation (HCC)    Bilateral carotid artery disease, unspecified type (HCC)    Chronic fatigue    Chronic maxillary sinusitis    Other chest pain    AR (allergic rhinitis)    Family history of prostate cancer    Advanced care planning/counseling discussion    Anticoagulation adequate    Presence of Watchman left atrial appendage closure device    Dizziness   .    Today patient is here for an acute visit.   he does not have other concerns.    Upper respiratory illness:  Basilio Zuluaga presents with complaints of productive cough for 2 weeks.  no nausea and no vomiting .  he has not had  fever, chills, and fatigue since late May. Felt like he had a cold.  Symptoms are moderate.  Over-the-counter remedies including OTC cough meds.   Drinking plenty of fluids: yes  Asthma?:  no  non-smoker  Contacts with similar infections: yes, daughter with cold just before this started.   Denies any ongoing systemic signs of infection.    ROS  Review of Systems   Constitutional:  Negative for activity change, appetite change and fever.   Respiratory:  Positive for cough. Negative for apnea, choking, chest tightness, shortness of breath, wheezing and stridor.    Cardiovascular:  Negative for chest pain.   Gastrointestinal:  Negative for abdominal pain, constipation and diarrhea.   Musculoskeletal:  Negative for back pain and myalgias.

## 2024-06-13 ENCOUNTER — PATIENT MESSAGE (OUTPATIENT)
Age: 79
End: 2024-06-13

## 2024-06-14 RX ORDER — ATORVASTATIN CALCIUM 80 MG/1
80 TABLET, FILM COATED ORAL NIGHTLY
Qty: 90 TABLET | Refills: 1 | Status: SHIPPED | OUTPATIENT
Start: 2024-06-14

## 2024-06-14 NOTE — TELEPHONE ENCOUNTER
From: Basilio Zuluaga  To: Dr. Jarvis Clements  Sent: 6/13/2024 8:50 PM EDT  Subject: Atorvastin    I have used up a 30 day 80 MG Atorvastin prescription given me at the hospital in late April. I have been using my 40 MG supply twice a day, need a refill but need a new prescription. Cardiologist won’t give me script for 40 MG (neurologist thing). Could you send. [facility] a 90 Atorvastin 40 MG script (hoping to get back to that) and I will keep taking two a day until I see you or I could ask cardio office for a 30 day supply of 80 MG. Thank you

## 2024-06-18 ENCOUNTER — TELEPHONE (OUTPATIENT)
Age: 79
End: 2024-06-18

## 2024-06-25 SDOH — HEALTH STABILITY: PHYSICAL HEALTH: ON AVERAGE, HOW MANY MINUTES DO YOU ENGAGE IN EXERCISE AT THIS LEVEL?: 40 MIN

## 2024-06-25 SDOH — HEALTH STABILITY: PHYSICAL HEALTH: ON AVERAGE, HOW MANY DAYS PER WEEK DO YOU ENGAGE IN MODERATE TO STRENUOUS EXERCISE (LIKE A BRISK WALK)?: 4 DAYS

## 2024-06-25 ASSESSMENT — PATIENT HEALTH QUESTIONNAIRE - PHQ9
SUM OF ALL RESPONSES TO PHQ QUESTIONS 1-9: 0
SUM OF ALL RESPONSES TO PHQ QUESTIONS 1-9: 0
1. LITTLE INTEREST OR PLEASURE IN DOING THINGS: NOT AT ALL
SUM OF ALL RESPONSES TO PHQ QUESTIONS 1-9: 0
SUM OF ALL RESPONSES TO PHQ QUESTIONS 1-9: 0
2. FEELING DOWN, DEPRESSED OR HOPELESS: NOT AT ALL
SUM OF ALL RESPONSES TO PHQ9 QUESTIONS 1 & 2: 0

## 2024-06-25 ASSESSMENT — LIFESTYLE VARIABLES
HOW OFTEN DO YOU HAVE SIX OR MORE DRINKS ON ONE OCCASION: 1
HOW OFTEN DO YOU HAVE A DRINK CONTAINING ALCOHOL: 1
HOW MANY STANDARD DRINKS CONTAINING ALCOHOL DO YOU HAVE ON A TYPICAL DAY: PATIENT DOES NOT DRINK
HOW OFTEN DO YOU HAVE A DRINK CONTAINING ALCOHOL: NEVER
HOW MANY STANDARD DRINKS CONTAINING ALCOHOL DO YOU HAVE ON A TYPICAL DAY: 0

## 2024-06-27 NOTE — PROGRESS NOTES
Tympanic membrane normal.      Nose: Nose normal.   Eyes:      Extraocular Movements: Extraocular movements intact.      Conjunctiva/sclera: Conjunctivae normal.   Cardiovascular:      Rate and Rhythm: Normal rate and regular rhythm.      Pulses: Normal pulses.      Heart sounds: No murmur heard.  Pulmonary:      Effort: Pulmonary effort is normal.      Breath sounds: Normal breath sounds. No wheezing.   Abdominal:      General: There is no distension.      Palpations: Abdomen is soft.   Musculoskeletal:         General: No swelling.      Cervical back: Normal range of motion.   Skin:     General: Skin is warm and dry.   Neurological:      General: No focal deficit present.      Mental Status: He is alert. Mental status is at baseline.   Psychiatric:         Mood and Affect: Mood normal.         Behavior: Behavior normal.           Current Outpatient Medications   Medication Sig    clopidogrel (PLAVIX) 75 MG tablet Take 1 tablet by mouth daily    pantoprazole (PROTONIX) 20 MG tablet Take 1 tablet by mouth every morning (before breakfast)    atorvastatin (LIPITOR) 80 MG tablet Take 1 tablet by mouth nightly    lisinopril (PRINIVIL;ZESTRIL) 2.5 MG tablet TAKE 1 TABLET DAILY    ipratropium (ATROVENT) 0.03 % nasal spray USE 2 SPRAYS NASALLY IN THE MORNING AND 2 SPRAYS IN THE EVENING    Cholecalciferol (VITAMIN D) 50 MCG (2000 UT) CAPS capsule Take by mouth    fluticasone (FLONASE) 50 MCG/ACT nasal spray USE 2 SPRAYS IN EACH NOSTRIL DAILY    metoprolol succinate (TOPROL XL) 50 MG extended release tablet TAKE ONE AND ONE-HALF TABLETS DAILY (Patient taking differently: 1.5 tablets every evening)    dicyclomine (BENTYL) 10 MG capsule TAKE 1 CAPSULE TWICE A DAY    Cyanocobalamin (VITAMIN B 12) 500 MCG TABS Take 2,000 mcg by mouth daily lozenges    Probiotic Product (PROBIOTIC-10 PO) Take 1 capsule by mouth daily    KRILL OIL PO Take 1 capsule by mouth daily    acetaminophen (TYLENOL) 500 MG tablet Take by mouth 3 times daily

## 2024-06-28 ENCOUNTER — OFFICE VISIT (OUTPATIENT)
Age: 79
End: 2024-06-28
Payer: MEDICARE

## 2024-06-28 VITALS
DIASTOLIC BLOOD PRESSURE: 81 MMHG | RESPIRATION RATE: 16 BRPM | OXYGEN SATURATION: 96 % | HEIGHT: 74 IN | HEART RATE: 60 BPM | SYSTOLIC BLOOD PRESSURE: 127 MMHG | TEMPERATURE: 97.8 F | BODY MASS INDEX: 27.08 KG/M2 | WEIGHT: 211 LBS

## 2024-06-28 DIAGNOSIS — N40.0 BENIGN PROSTATIC HYPERPLASIA, UNSPECIFIED WHETHER LOWER URINARY TRACT SYMPTOMS PRESENT: ICD-10-CM

## 2024-06-28 DIAGNOSIS — I48.21 PERMANENT ATRIAL FIBRILLATION (HCC): ICD-10-CM

## 2024-06-28 DIAGNOSIS — K21.9 GASTROESOPHAGEAL REFLUX DISEASE WITHOUT ESOPHAGITIS: ICD-10-CM

## 2024-06-28 DIAGNOSIS — R93.0 ABNORMAL COMPUTED TOMOGRAPHY ANGIOGRAPHY OF HEAD: ICD-10-CM

## 2024-06-28 DIAGNOSIS — Z95.818 PRESENCE OF WATCHMAN LEFT ATRIAL APPENDAGE CLOSURE DEVICE: ICD-10-CM

## 2024-06-28 DIAGNOSIS — E78.5 DYSLIPIDEMIA: ICD-10-CM

## 2024-06-28 DIAGNOSIS — K58.0 IRRITABLE BOWEL SYNDROME WITH DIARRHEA: ICD-10-CM

## 2024-06-28 DIAGNOSIS — Z00.00 MEDICARE ANNUAL WELLNESS VISIT, SUBSEQUENT: Primary | ICD-10-CM

## 2024-06-28 PROCEDURE — G0439 PPPS, SUBSEQ VISIT: HCPCS | Performed by: INTERNAL MEDICINE

## 2024-06-28 PROCEDURE — 1123F ACP DISCUSS/DSCN MKR DOCD: CPT | Performed by: INTERNAL MEDICINE

## 2024-06-28 RX ORDER — PANTOPRAZOLE SODIUM 20 MG/1
20 TABLET, DELAYED RELEASE ORAL
Qty: 90 TABLET | Refills: 1 | Status: SHIPPED | OUTPATIENT
Start: 2024-06-28

## 2024-06-28 RX ORDER — CLOPIDOGREL BISULFATE 75 MG/1
75 TABLET ORAL DAILY
Qty: 90 TABLET | Refills: 1 | Status: SHIPPED | OUTPATIENT
Start: 2024-06-28

## 2024-06-28 ASSESSMENT — ENCOUNTER SYMPTOMS
CONSTIPATION: 0
DIARRHEA: 1
SHORTNESS OF BREATH: 0
BLOOD IN STOOL: 0
FACIAL SWELLING: 0
ANAL BLEEDING: 0
VOMITING: 0
RHINORRHEA: 0
BACK PAIN: 0
NAUSEA: 0
RECTAL PAIN: 0
SINUS PAIN: 0
WHEEZING: 0
ABDOMINAL PAIN: 0

## 2024-06-28 NOTE — PATIENT INSTRUCTIONS
device in the bathroom with you.   Where can you learn more?  Go to https://www.SafeNet.net/patientEd and enter G117 to learn more about \"Preventing Falls: Care Instructions.\"  Current as of: July 17, 2023  Content Version: 14.1  © 5006-5768 [employer].   Care instructions adapted under license by [employer]. If you have questions about a medical condition or this instruction, always ask your healthcare professional. [employer] disclaims any warranty or liability for your use of this information.           Hearing Loss: Care Instructions  Overview     Hearing loss is a sudden or slow decrease in how well you hear. It can range from slight to profound. Permanent hearing loss can occur with aging. It also can happen when you are exposed long-term to loud noise. Examples include listening to loud music, riding motorcycles, or being around other loud machines.  Hearing loss can affect your work and home life. It can make you feel lonely or depressed. You may feel that you have lost your independence. But hearing aids and other devices can help you hear better and feel connected to others.  Follow-up care is a key part of your treatment and safety. Be sure to make and go to all appointments, and call your doctor if you are having problems. It's also a good idea to know your test results and keep a list of the medicines you take.  How can you care for yourself at home?  Avoid loud noises whenever possible. This helps keep your hearing from getting worse.  Always wear hearing protection around loud noises.  Wear a hearing aid as directed.  A professional can help you pick a hearing aid that will work best for you.  You can also get hearing aids over the counter for mild to moderate hearing loss.  Have hearing tests as your doctor suggests. They can show whether your hearing has changed. Your hearing aid may need to be adjusted.  Use other devices as needed. These may include:  Telephone

## 2024-07-01 ENCOUNTER — TELEPHONE (OUTPATIENT)
Age: 79
End: 2024-07-01

## 2024-07-01 DIAGNOSIS — I48.21 PERMANENT ATRIAL FIBRILLATION (HCC): Primary | ICD-10-CM

## 2024-07-01 DIAGNOSIS — Z95.818 PRESENCE OF WATCHMAN LEFT ATRIAL APPENDAGE CLOSURE DEVICE: ICD-10-CM

## 2024-07-05 DIAGNOSIS — Z95.818 PRESENCE OF WATCHMAN LEFT ATRIAL APPENDAGE CLOSURE DEVICE: ICD-10-CM

## 2024-07-05 DIAGNOSIS — I48.21 PERMANENT ATRIAL FIBRILLATION (HCC): ICD-10-CM

## 2024-07-08 LAB — P2Y12 PLT RESPONSE: 111 PRU (ref 194–418)

## 2024-07-29 PROCEDURE — 93294 REM INTERROG EVL PM/LDLS PM: CPT | Performed by: INTERNAL MEDICINE

## 2024-08-06 ENCOUNTER — PROCEDURE VISIT (OUTPATIENT)
Age: 79
End: 2024-08-06
Payer: MEDICARE

## 2024-08-06 ENCOUNTER — OFFICE VISIT (OUTPATIENT)
Age: 79
End: 2024-08-06
Payer: MEDICARE

## 2024-08-06 VITALS
RESPIRATION RATE: 16 BRPM | WEIGHT: 210.8 LBS | HEART RATE: 71 BPM | BODY MASS INDEX: 27.05 KG/M2 | HEIGHT: 74 IN | DIASTOLIC BLOOD PRESSURE: 76 MMHG | OXYGEN SATURATION: 99 % | SYSTOLIC BLOOD PRESSURE: 148 MMHG

## 2024-08-06 DIAGNOSIS — Z95.0 CARDIAC PACEMAKER IN SITU: ICD-10-CM

## 2024-08-06 DIAGNOSIS — I48.20 CHRONIC ATRIAL FIBRILLATION (HCC): ICD-10-CM

## 2024-08-06 DIAGNOSIS — Z79.01 ANTICOAGULATION ADEQUATE: ICD-10-CM

## 2024-08-06 DIAGNOSIS — I49.5 BRADY-TACHY SYNDROME (HCC): ICD-10-CM

## 2024-08-06 DIAGNOSIS — Z95.0 CARDIAC PACEMAKER IN SITU: Primary | ICD-10-CM

## 2024-08-06 DIAGNOSIS — Z95.818 PRESENCE OF WATCHMAN LEFT ATRIAL APPENDAGE CLOSURE DEVICE: ICD-10-CM

## 2024-08-06 DIAGNOSIS — I48.21 PERMANENT ATRIAL FIBRILLATION (HCC): Primary | ICD-10-CM

## 2024-08-06 PROCEDURE — 1036F TOBACCO NON-USER: CPT

## 2024-08-06 PROCEDURE — 99214 OFFICE O/P EST MOD 30 MIN: CPT

## 2024-08-06 PROCEDURE — G8427 DOCREV CUR MEDS BY ELIG CLIN: HCPCS

## 2024-08-06 PROCEDURE — 93279 PRGRMG DEV EVAL PM/LDLS PM: CPT | Performed by: INTERNAL MEDICINE

## 2024-08-06 PROCEDURE — 1123F ACP DISCUSS/DSCN MKR DOCD: CPT

## 2024-08-06 PROCEDURE — G8419 CALC BMI OUT NRM PARAM NOF/U: HCPCS

## 2024-08-06 ASSESSMENT — PATIENT HEALTH QUESTIONNAIRE - PHQ9
SUM OF ALL RESPONSES TO PHQ QUESTIONS 1-9: 0
2. FEELING DOWN, DEPRESSED OR HOPELESS: NOT AT ALL
SUM OF ALL RESPONSES TO PHQ QUESTIONS 1-9: 0
SUM OF ALL RESPONSES TO PHQ9 QUESTIONS 1 & 2: 0
SUM OF ALL RESPONSES TO PHQ QUESTIONS 1-9: 0
1. LITTLE INTEREST OR PLEASURE IN DOING THINGS: NOT AT ALL
SUM OF ALL RESPONSES TO PHQ QUESTIONS 1-9: 0

## 2024-08-06 NOTE — PROGRESS NOTES
Room #: 3    Chief Complaint   Patient presents with    Annual Exam    Device Check       Vitals:    08/06/24 1310 08/06/24 1321   BP: (!) 158/98 (!) 148/76   Site: Right Upper Arm Left Upper Arm   Position: Sitting Sitting   Cuff Size: Medium Adult Medium Adult   Pulse: 71    Resp: 16    SpO2: 99%    Weight: 95.6 kg (210 lb 12.8 oz)    Height: 1.88 m (6' 2\")          Chest pain:  NO    Have you been to the ER, urgent care, or hospitalized outside of [facility] since your last visit?   NO    Refills:  NO  
Pacemaker and AF.      Prior patient of Dr. Buck with history of dual-chamber pacemaker, tachycardia/bradycardia syndrome, GERD, peripheral vascular disease, dyslipidemia,  CVA, atrial fibrillation.  He has had issues with bleeding on anticoagulation and is being referred for possible Watchman implantation. History of prior CVA in 2014 with residual visual deficit. Had another CVA in 12/22 affecting his right eye. Previously on Pradaxa, now on Eliquis, ASA transition to Plavix.  Aside from his visual disturbance denies of any chest pain or shortness of breath.     S/p Watchman implant 10/25/23.     He reports feeling well overall. Denies chest pain, palpitations, shortness of breath, and dizziness.     - Hospital admission 4/22-4/23/2024 for dizziness and difficulty walking. Concern for stroke but unable to obtain MRI due to old retained leads and device not being MRI compatible. 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 He followed up with an interventional neurologist who said he did not have a stroke/TIA. Eliquis has since been stopped and he continues Plavix.   - Fell and broke hand in February 2024.     Review of Systems:   12 point review of systems was performed. 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

## 2024-08-07 PROCEDURE — 93279 PRGRMG DEV EVAL PM/LDLS PM: CPT | Performed by: INTERNAL MEDICINE

## 2024-10-24 ENCOUNTER — OFFICE VISIT (OUTPATIENT)
Age: 79
End: 2024-10-24
Payer: MEDICARE

## 2024-10-24 VITALS
SYSTOLIC BLOOD PRESSURE: 128 MMHG | BODY MASS INDEX: 28.23 KG/M2 | HEART RATE: 60 BPM | OXYGEN SATURATION: 96 % | HEIGHT: 74 IN | DIASTOLIC BLOOD PRESSURE: 70 MMHG | WEIGHT: 220 LBS

## 2024-10-24 DIAGNOSIS — Z95.818 PRESENCE OF WATCHMAN LEFT ATRIAL APPENDAGE CLOSURE DEVICE: ICD-10-CM

## 2024-10-24 DIAGNOSIS — I49.5 BRADY-TACHY SYNDROME (HCC): ICD-10-CM

## 2024-10-24 DIAGNOSIS — I48.21 PERMANENT ATRIAL FIBRILLATION (HCC): Primary | ICD-10-CM

## 2024-10-24 PROCEDURE — G8484 FLU IMMUNIZE NO ADMIN: HCPCS | Performed by: SPECIALIST

## 2024-10-24 PROCEDURE — G8427 DOCREV CUR MEDS BY ELIG CLIN: HCPCS | Performed by: SPECIALIST

## 2024-10-24 PROCEDURE — 1036F TOBACCO NON-USER: CPT | Performed by: SPECIALIST

## 2024-10-24 PROCEDURE — 1159F MED LIST DOCD IN RCRD: CPT | Performed by: SPECIALIST

## 2024-10-24 PROCEDURE — 99214 OFFICE O/P EST MOD 30 MIN: CPT | Performed by: SPECIALIST

## 2024-10-24 PROCEDURE — 1126F AMNT PAIN NOTED NONE PRSNT: CPT | Performed by: SPECIALIST

## 2024-10-24 PROCEDURE — 1123F ACP DISCUSS/DSCN MKR DOCD: CPT | Performed by: SPECIALIST

## 2024-10-24 PROCEDURE — G8419 CALC BMI OUT NRM PARAM NOF/U: HCPCS | Performed by: SPECIALIST

## 2024-10-24 NOTE — PROGRESS NOTES
Chief Complaint   Patient presents with    4 month f/u     Stroke  PAF  Watchman  Tachy/polly     Vitals:    10/24/24 0913   Weight: 99.8 kg (220 lb)   Height: 1.88 m (6' 2\")   Reports that he is feeling a fluttering feeling in his chest that does go away and lasts for a few seconds.    NEEDS CARDIAC CLEARANCE FOR TOOTH IMPLANT AND IF AND FOR HOW LONG SHOULD HE HOLD PLAVIX    Chest pain NO     ER, urgent care, or hospitalized outside of [facility] since your last visit?  NO     Refills NO

## 2024-10-24 NOTE — PROGRESS NOTES
Silvia Cotton MD. [facility]          Patient: Basilio Zuluaga  : 1945      Today's Date: 10/24/2024        HISTORY OF PRESENT ILLNESS:     History of Present Illness:  He is trying to exercise more.    Has some heart fluttering sensation at times.    No sig CP or lightheadedness.         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    Thyroid nodule 2009    Vision problems        Past Surgical History:   Procedure Laterality Date    CARDIAC PROCEDURE N/A 10/25/2023    Intracardiac echocardiogram performed by Sanjuana Moses MD at [facility] CARDIAC CATH LAB    COLONOSCOPY      COLONOSCOPY N/A 10/21/2019    COLONOSCOPY- Check for device orders performed by Lawson Correia MD at [facility] ENDOSCOPY    ECHO 2D ADULT  8/15/11    EF 55%, biatrial enlargement, mild MR    EP DEVICE PROCEDURE N/A 10/25/2023    Watchman paolo closure device performed by Sanjuana Moses MD at [facility] CARDIAC CATH LAB    EYE SURGERY  2017    Cataract    HEENT  2008    nasal septoplasty    INVASIVE VASCULAR N/A 10/25/2023    Ultrasound guided vascular access performed by Sanjuana Moses MD at [facility] CARDIAC CATH LAB    JOINT REPLACEMENT  2021    Knee    KNEE ARTHROSCOPY  2016    ORTHOPEDIC SURGERY  2016    Right Knee Scope with meniscus surgery    PACEMAKER  Aug 2009    Dr. Cynthia PHAM UNLISTED PROCEDURE CARDIAC SURGERY      pacemaker placement    REFRACTIVE SURGERY  2017    Right Eye laser treatment

## 2024-11-03 ENCOUNTER — PATIENT MESSAGE (OUTPATIENT)
Age: 79
End: 2024-11-03

## 2024-11-18 DIAGNOSIS — K21.9 GASTROESOPHAGEAL REFLUX DISEASE WITHOUT ESOPHAGITIS: ICD-10-CM

## 2024-11-18 RX ORDER — PANTOPRAZOLE SODIUM 20 MG/1
20 TABLET, DELAYED RELEASE ORAL
Qty: 90 TABLET | Refills: 3 | Status: SHIPPED | OUTPATIENT
Start: 2024-11-18

## 2024-11-20 ENCOUNTER — PATIENT MESSAGE (OUTPATIENT)
Age: 79
End: 2024-11-20

## 2024-11-26 ENCOUNTER — PATIENT MESSAGE (OUTPATIENT)
Age: 79
End: 2024-11-26

## 2024-11-26 NOTE — TELEPHONE ENCOUNTER
Dental procedure / Knee surgery plans   - he should continue his plavix uninterrupted unless it needs to be held for a surgical procedure (would hold 5 days prior)   - If he needs to hold his plavix, would take ASA 81 mg daily if possible

## 2024-12-09 DIAGNOSIS — Z95.818 PRESENCE OF WATCHMAN LEFT ATRIAL APPENDAGE CLOSURE DEVICE: ICD-10-CM

## 2024-12-09 RX ORDER — CLOPIDOGREL BISULFATE 75 MG/1
75 TABLET ORAL DAILY
Qty: 90 TABLET | Refills: 3 | Status: SHIPPED | OUTPATIENT
Start: 2024-12-09

## 2024-12-09 RX ORDER — ATORVASTATIN CALCIUM 80 MG/1
80 TABLET, FILM COATED ORAL NIGHTLY
Qty: 90 TABLET | Refills: 3 | Status: SHIPPED | OUTPATIENT
Start: 2024-12-09

## 2024-12-17 ENCOUNTER — TRANSCRIBE ORDERS (OUTPATIENT)
Facility: HOSPITAL | Age: 79
End: 2024-12-17

## 2024-12-17 DIAGNOSIS — R22.42 LOCALIZED SWELLING, MASS AND LUMP, LOWER LIMB, LEFT: Primary | ICD-10-CM

## 2024-12-17 DIAGNOSIS — Z96.652 STATUS POST LEFT PARTIAL KNEE REPLACEMENT: ICD-10-CM

## 2024-12-18 ENCOUNTER — HOSPITAL ENCOUNTER (OUTPATIENT)
Dept: VASCULAR SURGERY | Facility: HOSPITAL | Age: 79
Discharge: HOME | End: 2024-12-20
Attending: ORTHOPAEDIC SURGERY
Payer: MEDICARE

## 2024-12-18 DIAGNOSIS — Z96.652 STATUS POST LEFT PARTIAL KNEE REPLACEMENT: ICD-10-CM

## 2024-12-18 DIAGNOSIS — R22.42 LOCALIZED SWELLING, MASS AND LUMP, LOWER LIMB, LEFT: ICD-10-CM

## 2024-12-18 PROCEDURE — 93971 EXTREMITY STUDY: CPT

## 2025-01-15 SDOH — ECONOMIC STABILITY: FOOD INSECURITY: WITHIN THE PAST 12 MONTHS, THE FOOD YOU BOUGHT JUST DIDN'T LAST AND YOU DIDN'T HAVE MONEY TO GET MORE.: NEVER TRUE

## 2025-01-15 SDOH — ECONOMIC STABILITY: FOOD INSECURITY: WITHIN THE PAST 12 MONTHS, YOU WORRIED THAT YOUR FOOD WOULD RUN OUT BEFORE YOU GOT MONEY TO BUY MORE.: NEVER TRUE

## 2025-01-15 SDOH — ECONOMIC STABILITY: INCOME INSECURITY: IN THE LAST 12 MONTHS, WAS THERE A TIME WHEN YOU WERE NOT ABLE TO PAY THE MORTGAGE OR RENT ON TIME?: NO

## 2025-01-15 SDOH — ECONOMIC STABILITY: TRANSPORTATION INSECURITY
IN THE PAST 12 MONTHS, HAS THE LACK OF TRANSPORTATION KEPT YOU FROM MEDICAL APPOINTMENTS OR FROM GETTING MEDICATIONS?: NO

## 2025-01-20 ENCOUNTER — OFFICE VISIT (OUTPATIENT)
Facility: CLINIC | Age: 80
End: 2025-01-20
Payer: MEDICARE

## 2025-01-20 VITALS
DIASTOLIC BLOOD PRESSURE: 86 MMHG | SYSTOLIC BLOOD PRESSURE: 138 MMHG | WEIGHT: 216 LBS | OXYGEN SATURATION: 100 % | HEART RATE: 70 BPM | TEMPERATURE: 98 F | HEIGHT: 74 IN | BODY MASS INDEX: 27.72 KG/M2 | RESPIRATION RATE: 16 BRPM

## 2025-01-20 DIAGNOSIS — E78.5 DYSLIPIDEMIA: Primary | ICD-10-CM

## 2025-01-20 DIAGNOSIS — R03.0 ELEVATED BP WITHOUT DIAGNOSIS OF HYPERTENSION: ICD-10-CM

## 2025-01-20 DIAGNOSIS — M79.89 LEG SWELLING: ICD-10-CM

## 2025-01-20 DIAGNOSIS — E78.5 DYSLIPIDEMIA: ICD-10-CM

## 2025-01-20 DIAGNOSIS — Z96.652 STATUS POST TOTAL LEFT KNEE REPLACEMENT: ICD-10-CM

## 2025-01-20 DIAGNOSIS — K59.03 DRUG-INDUCED CONSTIPATION: ICD-10-CM

## 2025-01-20 LAB
ALBUMIN SERPL-MCNC: 3.6 G/DL (ref 3.5–5)
ALBUMIN/GLOB SERPL: 1.2 (ref 1.1–2.2)
ALP SERPL-CCNC: 143 U/L (ref 45–117)
ALT SERPL-CCNC: 18 U/L (ref 12–78)
ANION GAP SERPL CALC-SCNC: 4 MMOL/L (ref 2–12)
AST SERPL-CCNC: 23 U/L (ref 15–37)
BASOPHILS # BLD: 0.05 K/UL (ref 0–0.1)
BASOPHILS NFR BLD: 0.9 % (ref 0–1)
BILIRUB SERPL-MCNC: 1.4 MG/DL (ref 0.2–1)
BUN SERPL-MCNC: 14 MG/DL (ref 6–20)
BUN/CREAT SERPL: 15 (ref 12–20)
CALCIUM SERPL-MCNC: 9.3 MG/DL (ref 8.5–10.1)
CHLORIDE SERPL-SCNC: 104 MMOL/L (ref 97–108)
CO2 SERPL-SCNC: 28 MMOL/L (ref 21–32)
CREAT SERPL-MCNC: 0.95 MG/DL (ref 0.7–1.3)
DIFFERENTIAL METHOD BLD: NORMAL
EOSINOPHIL # BLD: 0.04 K/UL (ref 0–0.4)
EOSINOPHIL NFR BLD: 0.7 % (ref 0–7)
ERYTHROCYTE [DISTWIDTH] IN BLOOD BY AUTOMATED COUNT: 14.1 % (ref 11.5–14.5)
GLOBULIN SER CALC-MCNC: 3.1 G/DL (ref 2–4)
GLUCOSE SERPL-MCNC: 96 MG/DL (ref 65–100)
HCT VFR BLD AUTO: 41.5 % (ref 36.6–50.3)
HGB BLD-MCNC: 14 G/DL (ref 12.1–17)
IMM GRANULOCYTES # BLD AUTO: 0.03 K/UL (ref 0–0.04)
IMM GRANULOCYTES NFR BLD AUTO: 0.5 % (ref 0–0.5)
LYMPHOCYTES # BLD: 1.08 K/UL (ref 0.8–3.5)
LYMPHOCYTES NFR BLD: 19.3 % (ref 12–49)
MCH RBC QN AUTO: 30.2 PG (ref 26–34)
MCHC RBC AUTO-ENTMCNC: 33.7 G/DL (ref 30–36.5)
MCV RBC AUTO: 89.4 FL (ref 80–99)
MONOCYTES # BLD: 0.49 K/UL (ref 0–1)
MONOCYTES NFR BLD: 8.8 % (ref 5–13)
NEUTS SEG # BLD: 3.9 K/UL (ref 1.8–8)
NEUTS SEG NFR BLD: 69.8 % (ref 32–75)
NRBC # BLD: 0 K/UL (ref 0–0.01)
NRBC BLD-RTO: 0 PER 100 WBC
PLATELET # BLD AUTO: 243 K/UL (ref 150–400)
PMV BLD AUTO: 9.3 FL (ref 8.9–12.9)
POTASSIUM SERPL-SCNC: 4.3 MMOL/L (ref 3.5–5.1)
PROT SERPL-MCNC: 6.7 G/DL (ref 6.4–8.2)
RBC # BLD AUTO: 4.64 M/UL (ref 4.1–5.7)
SODIUM SERPL-SCNC: 136 MMOL/L (ref 136–145)
WBC # BLD AUTO: 5.6 K/UL (ref 4.1–11.1)

## 2025-01-20 PROCEDURE — 1123F ACP DISCUSS/DSCN MKR DOCD: CPT | Performed by: INTERNAL MEDICINE

## 2025-01-20 PROCEDURE — 99214 OFFICE O/P EST MOD 30 MIN: CPT | Performed by: INTERNAL MEDICINE

## 2025-01-20 PROCEDURE — 1159F MED LIST DOCD IN RCRD: CPT | Performed by: INTERNAL MEDICINE

## 2025-01-20 PROCEDURE — 1125F AMNT PAIN NOTED PAIN PRSNT: CPT | Performed by: INTERNAL MEDICINE

## 2025-01-20 PROCEDURE — 1160F RVW MEDS BY RX/DR IN RCRD: CPT | Performed by: INTERNAL MEDICINE

## 2025-01-20 PROCEDURE — G8419 CALC BMI OUT NRM PARAM NOF/U: HCPCS | Performed by: INTERNAL MEDICINE

## 2025-01-20 PROCEDURE — G8427 DOCREV CUR MEDS BY ELIG CLIN: HCPCS | Performed by: INTERNAL MEDICINE

## 2025-01-20 PROCEDURE — 1036F TOBACCO NON-USER: CPT | Performed by: INTERNAL MEDICINE

## 2025-01-20 RX ORDER — HYDROCODONE BITARTRATE AND ACETAMINOPHEN 5; 325 MG/1; MG/1
1 TABLET ORAL EVERY 6 HOURS PRN
COMMUNITY
Start: 2025-01-15

## 2025-01-20 RX ORDER — PSEUDOEPHEDRINE HCL 30 MG
100 TABLET ORAL
COMMUNITY
Start: 2024-12-12

## 2025-01-20 RX ORDER — ASPIRIN 81 MG/1
81 TABLET ORAL 2 TIMES DAILY
COMMUNITY
Start: 2024-12-12

## 2025-01-20 ASSESSMENT — PATIENT HEALTH QUESTIONNAIRE - PHQ9
2. FEELING DOWN, DEPRESSED OR HOPELESS: NOT AT ALL
SUM OF ALL RESPONSES TO PHQ QUESTIONS 1-9: 0
SUM OF ALL RESPONSES TO PHQ QUESTIONS 1-9: 0
SUM OF ALL RESPONSES TO PHQ9 QUESTIONS 1 & 2: 0
1. LITTLE INTEREST OR PLEASURE IN DOING THINGS: NOT AT ALL
SUM OF ALL RESPONSES TO PHQ QUESTIONS 1-9: 0
SUM OF ALL RESPONSES TO PHQ QUESTIONS 1-9: 0

## 2025-01-20 ASSESSMENT — ENCOUNTER SYMPTOMS
DIARRHEA: 0
BACK PAIN: 0
CONSTIPATION: 0
WHEEZING: 0
ABDOMINAL PAIN: 0
SHORTNESS OF BREATH: 0

## 2025-01-20 NOTE — PROGRESS NOTES
Basilio Zuluaga is a 79 y.o. male who presents today for 6 Month Follow-Up  .      He has a history of   Patient Active Problem List   Diagnosis    Excess skin of eyelid    Gastroesophageal reflux disease without esophagitis    OA (osteoarthritis) of knee    Scoliosis    Elevated Lp(a)    Irritable bowel syndrome with diarrhea    Dyslipidemia    Thyroid nodule    Cardiac pacemaker in situ    Colon polyp    BPH (benign prostatic hyperplasia)    Bilateral carotid artery disease, unspecified type (HCC)    Chronic fatigue    Chronic maxillary sinusitis    Other chest pain    AR (allergic rhinitis)    Family history of prostate cancer    Advanced care planning/counseling discussion    Anticoagulation adequate    Presence of Watchman left atrial appendage closure device    Dizziness    Abnormal computed tomography angiography of head   .    Today patient is here for follow up.     History of Present Illness  The patient is an 79-year-old male who presents for a 6-month follow-up.    He underwent a left knee replacement in December 2024 and has been experiencing persistent pain and swelling in the left knee, which he reports as being severe. He is currently 6 weeks post-surgery and notes that the swelling has only slightly decreased since the operation. He elevates his leg 2 to 3 times daily but does not observe any significant reduction in swelling. He reports that the scab is healing well, but he experiences tightness in the back of his knee, which he initially thought was a lump. He maintains an active lifestyle, walking approximately 3000 steps daily, and ensures he remains upright during the day. He is scheduled to see Dr. Leon on Thursday. He is fasting today. He has been managing his pain with oxycodone until 3 days ago, after which he switched to hydrocodone, taking 2 doses daily. He also takes Tylenol, initially at a dose of 4000 mg for several weeks, and currently at a dose of 2000 mg. He attempts to manage his

## 2025-02-04 DIAGNOSIS — K58.0 IRRITABLE BOWEL SYNDROME WITH DIARRHEA: ICD-10-CM

## 2025-02-04 RX ORDER — DICYCLOMINE HYDROCHLORIDE 10 MG/1
10 CAPSULE ORAL 2 TIMES DAILY
Qty: 180 CAPSULE | Refills: 3 | Status: SHIPPED | OUTPATIENT
Start: 2025-02-04

## 2025-02-10 ENCOUNTER — PATIENT MESSAGE (OUTPATIENT)
Age: 80
End: 2025-02-10

## 2025-02-10 ENCOUNTER — TELEPHONE (OUTPATIENT)
Age: 80
End: 2025-02-10

## 2025-02-10 NOTE — TELEPHONE ENCOUNTER
Patient called about message below, nurse stated it's best to listen to physical therapist & his body but she's aware encounter is being sent for below because even after the call he still questioned advise, so please follow up about below, patient has physical therapy within the next 30 minutes, please assist with below...    BP staying at 180/80. Should I limit PT? I am in the midst of a rigorous program to recover from TKR  surgery.    Basilio Zuluaga

## 2025-02-11 RX ORDER — CELECOXIB 200 MG/1
200 CAPSULE ORAL DAILY
COMMUNITY
Start: 2025-01-23

## 2025-02-11 NOTE — TELEPHONE ENCOUNTER
Called pt,    Per Dr. Silvia Cotton: \"  If SBP staying up at 180, then add amlodipine 5 mg daily.  Once SBP < 150, can proceed with PT normally.   \"    Check for 2 weeks, 's-190's.    Per JUAN C Cross, NP: \"Increase lisinopril to 5mg. Either Dr. Cotton or PCP can manage his BP. \"    Oxy at night for tkr.  Celebrex; updated med rec.    Lisinopril am, Metoprolol pm  Has been checking BP right after, then mid day also.    He was unaware of checking BP an hour after meds  176/73 after PT.  170/69 even an hour after PT.    Pt stated he has 2 months of Lisinopril 2.5 mg still; will hold off on refill until pt requests.

## 2025-02-17 ENCOUNTER — TELEPHONE (OUTPATIENT)
Age: 80
End: 2025-02-17

## 2025-02-18 ENCOUNTER — PATIENT MESSAGE (OUTPATIENT)
Age: 80
End: 2025-02-18

## 2025-02-19 ENCOUNTER — TELEPHONE (OUTPATIENT)
Age: 80
End: 2025-02-19

## 2025-02-19 RX ORDER — LISINOPRIL 5 MG/1
5 TABLET ORAL DAILY
Qty: 90 TABLET | Refills: 3 | Status: SHIPPED | OUTPATIENT
Start: 2025-02-19

## 2025-02-19 RX ORDER — AMLODIPINE BESYLATE 5 MG/1
5 TABLET ORAL DAILY
Qty: 30 TABLET | Refills: 1 | Status: SHIPPED | OUTPATIENT
Start: 2025-02-19

## 2025-02-19 RX ORDER — FINASTERIDE 5 MG/1
5 TABLET, FILM COATED ORAL DAILY
COMMUNITY

## 2025-02-19 NOTE — TELEPHONE ENCOUNTER
2/11/25 telephone encounter:  Called pt,     Per Dr. Silvia Cotton: \"  If SBP staying up at 180, then add amlodipine 5 mg daily.  Once SBP < 150, can proceed with PT normally.   \"     Check for 2 weeks, 's-190's.     Per JUAN C Cross, NP: \"Increase lisinopril to 5mg. Either Dr. Cotton or PCP can manage his BP. \"     Oxy at night for tkr.  Celebrex; updated med rec.     Lisinopril am, Metoprolol pm  Has been checking BP right after, then mid day also.     He was unaware of checking BP an hour after meds  176/73 after PT.  170/69 even an hour after PT.     Pt stated he has 2 months of Lisinopril 2.5 mg still; will hold off on refill until pt requests.        Pt r/t call to follow up on BP,    He stated that SBP has gone down about 10 pts since increasing Lisinopril to 5 mg.  Ranging: -162; DBP 70-80's  Dr. Leon (Orthopedist) visit Monday BP was high.  Thinks Celebrex may be influencing pressures and pain from knee surgery?  Celebrex finishes Friday.    Confirmed taking:  Lisinopril 5 mg qd  Metoprolol 50 mg 1.5 tabs qd    Asked about PT and BP readings parameters? Also asking about water aerobics-- 45 min class and can get quite vigorous.    Will send BP log via [employer].

## 2025-02-19 NOTE — TELEPHONE ENCOUNTER
See other phone encounter.    Per Dr. Silvia Cotton: \"  I thought I asked him to add norvasc 5 mg daily before?  Add Norvasc 5 mg daily if no contraindication and get him into see Eloisa or me in next few weeks to further address.   \"  2/11/25 Per Dr. Silvia Cotton: \"  If SBP staying up at 180, then add amlodipine 5 mg daily.  Once SBP < 150, can proceed with PT normally.   \"

## 2025-02-19 NOTE — TELEPHONE ENCOUNTER
R/t call to pt,    Per Dr. Silvia Cotton: \"  If SBP staying up at 180, then add amlodipine 5 mg daily.  Once SBP < 150, can proceed with PT normally.   \"    Urologist wants to start Finasteride next week.  Added to med rec.  No contraindications for current medication list.  Pt requested local pharmacy for amlodipine & mail order for lisinopril.    Confirmed NOV  Future Appointments   Date Time Provider Department Center   4/25/2025 10:20 AM Silvia Cotton MD [facility] AMB   7/7/2025  1:30 PM Jarvis Clements MD [facility] DEP   8/20/2025  1:00 PM PACEMAKER, [facility] AMB   8/20/2025  1:20 PM Sanjuana Moses MD [facility] AMB

## 2025-02-27 ENCOUNTER — PATIENT MESSAGE (OUTPATIENT)
Age: 80
End: 2025-02-27

## 2025-02-27 NOTE — TELEPHONE ENCOUNTER
Spoke to pt's spouse,  She asked that we call this afternoon as he has a dentist apt currently.    Per Dr. Silvia Cotton: \"Can you please have him increase lisinopril to 10 mg daily - space out meds.  Cont other meds.  Goal BP < 130/80  Yes he can exercise and go to PT\"

## 2025-02-28 NOTE — TELEPHONE ENCOUNTER
R/t call to pt,    Confirmed pt is taking:  Lisinopril 5 mg am  Amlodipine 5 mg pm  Metoprolol 75 mg pm    Today /65.  Will continue to follow BP over the weekend prior to increasing the Lisinopril.    The only change is he started Finasteride yesterday.

## 2025-03-04 RX ORDER — AMLODIPINE BESYLATE 5 MG/1
5 TABLET ORAL DAILY
Qty: 90 TABLET | Refills: 3 | Status: SHIPPED | OUTPATIENT
Start: 2025-03-04

## 2025-03-12 ENCOUNTER — PATIENT MESSAGE (OUTPATIENT)
Age: 80
End: 2025-03-12

## 2025-03-14 NOTE — TELEPHONE ENCOUNTER
Per JUAN C Cross, NP: \"What do these even mean?  And which direction are they going? Are his SBP going up or down? Continue same meds until sees Dr. Cotton. Bring in cuff to appt.\"    Future Appointments   Date Time Provider Department Center   4/25/2025 10:20 AM Silvia Cotton MD [facility] AMB   7/7/2025  1:30 PM Jarvis Clements MD [facility]   8/20/2025  1:00 PM PACEMAKER, [facility] AMB   8/20/2025  1:20 PM Sanjuana Moses MD [facility] AMB

## 2025-03-22 ENCOUNTER — PATIENT MESSAGE (OUTPATIENT)
Age: 80
End: 2025-03-22

## 2025-03-25 RX ORDER — LISINOPRIL 20 MG/1
20 TABLET ORAL DAILY
Qty: 1 TABLET | Refills: 0
Start: 2025-03-25

## 2025-03-25 RX ORDER — AMLODIPINE BESYLATE 2.5 MG/1
2.5 TABLET ORAL DAILY
Qty: 1 TABLET | Refills: 0
Start: 2025-03-25

## 2025-03-25 NOTE — TELEPHONE ENCOUNTER
Dr. Clements, \"The amlodipine could be somewhat contributing to the swelling. I would wait for the doppler and consider dropping the amlodipine to 2.5mg and increasing the lisinopril to 10-20 mg.\"    Updated med rec.

## 2025-03-31 ENCOUNTER — TELEPHONE (OUTPATIENT)
Age: 80
End: 2025-03-31

## 2025-03-31 NOTE — TELEPHONE ENCOUNTER
Future Appointments   Date Time Provider Department Center   5/12/2025 10:40 AM Eloisa Cross APRN - [facility] AMB   7/7/2025  1:30 PM Jarvis Clements MD [facility]   8/20/2025  1:00 PM PACEMAKER, [facility] AMB   8/20/2025  1:20 PM Sanjuana Moses MD [facility] AMB     ID verified using two patient identifiers. Writer spoke with patient and informed him the lisinopril was ordered on 3/25/25, patient stated understanding.

## 2025-04-17 ENCOUNTER — OFFICE VISIT (OUTPATIENT)
Age: 80
End: 2025-04-17
Payer: MEDICARE

## 2025-04-17 VITALS
OXYGEN SATURATION: 99 % | WEIGHT: 212.6 LBS | SYSTOLIC BLOOD PRESSURE: 120 MMHG | BODY MASS INDEX: 27.3 KG/M2 | DIASTOLIC BLOOD PRESSURE: 80 MMHG | HEART RATE: 70 BPM

## 2025-04-17 DIAGNOSIS — I10 PRIMARY HYPERTENSION: ICD-10-CM

## 2025-04-17 DIAGNOSIS — I63.9 CEREBRAL INFARCTION, UNSPECIFIED MECHANISM (HCC): Primary | ICD-10-CM

## 2025-04-17 DIAGNOSIS — Z95.0 CARDIAC PACEMAKER IN SITU: ICD-10-CM

## 2025-04-17 DIAGNOSIS — Z79.01 ANTICOAGULATION ADEQUATE: ICD-10-CM

## 2025-04-17 DIAGNOSIS — I49.5 BRADY-TACHY SYNDROME (HCC): ICD-10-CM

## 2025-04-17 DIAGNOSIS — I77.9 BILATERAL CAROTID ARTERY DISEASE, UNSPECIFIED TYPE: ICD-10-CM

## 2025-04-17 DIAGNOSIS — Z95.818 PRESENCE OF WATCHMAN LEFT ATRIAL APPENDAGE CLOSURE DEVICE: ICD-10-CM

## 2025-04-17 DIAGNOSIS — I48.21 PERMANENT ATRIAL FIBRILLATION (HCC): ICD-10-CM

## 2025-04-17 PROCEDURE — G8427 DOCREV CUR MEDS BY ELIG CLIN: HCPCS

## 2025-04-17 PROCEDURE — 1123F ACP DISCUSS/DSCN MKR DOCD: CPT

## 2025-04-17 PROCEDURE — 3074F SYST BP LT 130 MM HG: CPT

## 2025-04-17 PROCEDURE — 93010 ELECTROCARDIOGRAM REPORT: CPT

## 2025-04-17 PROCEDURE — 99214 OFFICE O/P EST MOD 30 MIN: CPT

## 2025-04-17 PROCEDURE — 1159F MED LIST DOCD IN RCRD: CPT

## 2025-04-17 PROCEDURE — 3079F DIAST BP 80-89 MM HG: CPT

## 2025-04-17 PROCEDURE — 93005 ELECTROCARDIOGRAM TRACING: CPT

## 2025-04-17 PROCEDURE — 1036F TOBACCO NON-USER: CPT

## 2025-04-17 PROCEDURE — G8419 CALC BMI OUT NRM PARAM NOF/U: HCPCS

## 2025-04-17 RX ORDER — METOPROLOL SUCCINATE 50 MG/1
TABLET, EXTENDED RELEASE ORAL
Qty: 135 TABLET | Refills: 3 | OUTPATIENT
Start: 2025-04-17

## 2025-04-17 RX ORDER — AMLODIPINE BESYLATE 2.5 MG/1
2.5 TABLET ORAL DAILY
Qty: 90 TABLET | Refills: 3 | Status: SHIPPED | OUTPATIENT
Start: 2025-04-17

## 2025-04-17 RX ORDER — METOPROLOL SUCCINATE 50 MG/1
75 TABLET, EXTENDED RELEASE ORAL DAILY
Qty: 135 TABLET | Refills: 3 | Status: SHIPPED | OUTPATIENT
Start: 2025-04-17

## 2025-04-17 NOTE — PATIENT INSTRUCTIONS
Please replace your BP cuff    Upper arm Omron cuff    Your Bp here was 120/80  Your Blood pressure 189/80

## 2025-04-29 NOTE — TELEPHONE ENCOUNTER
Christian was written 3/20/2025 with a refill so why does she need refill now ?   Patients wife states that she is returning a call to our office.      Phone: [phone]

## 2025-05-06 ENCOUNTER — TELEPHONE (OUTPATIENT)
Age: 80
End: 2025-05-06

## 2025-05-06 NOTE — TELEPHONE ENCOUNTER
Future Appointments   Date Time Provider Department Center   7/7/2025  1:30 PM Jarvis Clements MD [facility] DEP   8/20/2025  1:00 PM PACEMAKER, [facility]  AMB   8/20/2025  1:20 PM Sanjuana Moses MD [facility] AMB   10/24/2025 11:20 AM [facility]     ID verified using two patient identifiers. Writer spoke with patient who reported that he's had a few nights that he gets achy in both of his thighs and his left calf feels tight.     Patient reported that he had left knee surgery and has recently had \"a lot of\" fluid drained from his left knee/calf area per the orthopedic provider.    Patient reports that he does get relief with taking Tylenol.    Writer explained that muscle pain can be a side effect from taking a statin but since this pain started after his knee surgery and not after the increase of the statin, that it may be related to his knee surgery. Patient stated agreement and said, \"I was okay until I started getting all of this fluid in my leg, I also don't have the range of motion in my knee yet.\" Writer advised patient to continue to f/u with his orthopedic provider, patient stated understanding.

## 2025-06-02 RX ORDER — IPRATROPIUM BROMIDE 21 UG/1
SPRAY, METERED NASAL
Qty: 30 ML | Refills: 7 | Status: SHIPPED | OUTPATIENT
Start: 2025-06-02

## 2025-06-05 ENCOUNTER — TELEPHONE (OUTPATIENT)
Age: 80
End: 2025-06-05

## 2025-06-05 NOTE — TELEPHONE ENCOUNTER
Received fax from [facility] for device management during R knee arthroscopy on 6/6.  Per Lo Ambrosio, NP can use magnet.  Faxed to [facility] at fax # [phone].  Received confirmation.

## 2025-06-05 NOTE — TELEPHONE ENCOUNTER
Patient having knee surgery on 6/6/25.  A pacemaker management form has been faxed over to [facility] from the [facility] [place].

## 2025-06-05 NOTE — TELEPHONE ENCOUNTER
Fax received 06/05/2025 from [facility] Preadmission Testing for clearance.    Procedure: Right knee arthroscopy 06/06/2025  Fax to: [phone]  Ph: [phone]  Office contact BERNY Terry    Per Eloisa Cross NP office note of 04/17/2025;  \"Knee surgery plans   - he should continue his plavix uninterrupted unless it needs to be held for a surgical procedure (would hold 5 days prior)   - If he needs to hold his plavix, would take ASA 81 mg daily if possible\"    Above and LOV of 04/17/2025 faxed to [phone]

## 2025-06-23 ENCOUNTER — PATIENT MESSAGE (OUTPATIENT)
Age: 80
End: 2025-06-23

## 2025-06-23 DIAGNOSIS — Z95.818 PRESENCE OF WATCHMAN LEFT ATRIAL APPENDAGE CLOSURE DEVICE: ICD-10-CM

## 2025-06-23 RX ORDER — CLOPIDOGREL BISULFATE 75 MG/1
75 TABLET ORAL DAILY
Qty: 90 TABLET | Refills: 3 | Status: SHIPPED
Start: 2025-06-23

## 2025-06-24 RX ORDER — ASPIRIN 81 MG/1
81 TABLET ORAL DAILY
COMMUNITY

## 2025-07-03 SDOH — HEALTH STABILITY: PHYSICAL HEALTH: ON AVERAGE, HOW MANY DAYS PER WEEK DO YOU ENGAGE IN MODERATE TO STRENUOUS EXERCISE (LIKE A BRISK WALK)?: 3 DAYS

## 2025-07-03 SDOH — HEALTH STABILITY: PHYSICAL HEALTH: ON AVERAGE, HOW MANY MINUTES DO YOU ENGAGE IN EXERCISE AT THIS LEVEL?: 30 MIN

## 2025-07-03 ASSESSMENT — LIFESTYLE VARIABLES
HOW MANY STANDARD DRINKS CONTAINING ALCOHOL DO YOU HAVE ON A TYPICAL DAY: PATIENT DOES NOT DRINK
HOW OFTEN DO YOU HAVE A DRINK CONTAINING ALCOHOL: 1
HOW OFTEN DO YOU HAVE A DRINK CONTAINING ALCOHOL: NEVER
HOW MANY STANDARD DRINKS CONTAINING ALCOHOL DO YOU HAVE ON A TYPICAL DAY: 0
HOW OFTEN DO YOU HAVE SIX OR MORE DRINKS ON ONE OCCASION: 1

## 2025-07-03 ASSESSMENT — PATIENT HEALTH QUESTIONNAIRE - PHQ9
1. LITTLE INTEREST OR PLEASURE IN DOING THINGS: NOT AT ALL
SUM OF ALL RESPONSES TO PHQ QUESTIONS 1-9: 0
2. FEELING DOWN, DEPRESSED OR HOPELESS: NOT AT ALL

## 2025-07-06 PROBLEM — I10 HYPERTENSION: Status: ACTIVE | Noted: 2025-07-06

## 2025-07-06 NOTE — PROGRESS NOTES
1318 07/07/25 1402   BP: (!) 165/90 (!) 134/58   BP Site: Left Upper Arm    Patient Position: Sitting    BP Cuff Size: Medium Adult    Pulse: 60    Resp: 16    Temp: 97.5 °F (36.4 °C)    TempSrc: Oral    SpO2: 97%    Weight: 96.2 kg (212 lb)    Height: 1.88 m (6' 2\")       Body mass index is 27.22 kg/m².                    Allergies   Allergen Reactions    Amoxicillin     Milk (Cow) Diarrhea    Neomycin     Bee Venom Rash     Yellowjackets    Clindamycin Hives and Rash    Penicillins Rash     States he is able to tolerate cephalexin with no adverse reaction      Sulfa Antibiotics Rash     Prior to Visit Medications    Medication Sig Taking? Authorizing Provider   clopidogrel (PLAVIX) 75 MG tablet Take 1 tablet by mouth daily HOLDING 6/10/25-7/10/25 per Dr. Leon Yes Silvia Cotton MD   ipratropium (ATROVENT) 0.03 % nasal spray USE 2 SPRAYS NASALLY IN THE MORNING AND 2 SPRAYS IN THE EVENING Yes Jarvis Clements MD   amLODIPine (NORVASC) 2.5 MG tablet Take 1 tablet by mouth daily Yes Eloisa Cross APRN - NP   metoprolol succinate (TOPROL XL) 50 MG extended release tablet Take 1.5 tablets by mouth daily Yes Eloisa Cross APRN - NP   lisinopril (PRINIVIL;ZESTRIL) 20 MG tablet Take 1 tablet by mouth daily Yes Silvia Cotton MD   finasteride (PROSCAR) 5 MG tablet Take 1 tablet by mouth daily Yes Maximus Flores MD   dicyclomine (BENTYL) 10 MG capsule TAKE 1 CAPSULE TWICE A DAY Yes Jarvis Clements MD   docusate (COLACE, DULCOLAX) 100 MG CAPS Take 100 mg by mouth Yes Maximus Flores MD   atorvastatin (LIPITOR) 80 MG tablet TAKE 1 TABLET NIGHTLY Yes Jarvis Clements MD   pantoprazole (PROTONIX) 20 MG tablet TAKE 1 TABLET EVERY MORNING BEFORE BREAKFAST Yes Jarvis Clements MD   Cholecalciferol (VITAMIN D) 50 MCG (2000 UT) CAPS capsule Take by mouth Yes Maximus Flores MD   fluticasone (FLONASE) 50 MCG/ACT nasal spray USE 2 SPRAYS IN EACH NOSTRIL DAILY Yes Jarvis Clements MD   Cyanocobalamin

## 2025-07-07 ENCOUNTER — OFFICE VISIT (OUTPATIENT)
Facility: CLINIC | Age: 80
End: 2025-07-07
Payer: MEDICARE

## 2025-07-07 VITALS
HEIGHT: 74 IN | BODY MASS INDEX: 27.21 KG/M2 | DIASTOLIC BLOOD PRESSURE: 58 MMHG | SYSTOLIC BLOOD PRESSURE: 134 MMHG | WEIGHT: 212 LBS | TEMPERATURE: 97.5 F | OXYGEN SATURATION: 97 % | RESPIRATION RATE: 16 BRPM | HEART RATE: 60 BPM

## 2025-07-07 DIAGNOSIS — K21.9 GASTROESOPHAGEAL REFLUX DISEASE WITHOUT ESOPHAGITIS: ICD-10-CM

## 2025-07-07 DIAGNOSIS — Z95.818 PRESENCE OF WATCHMAN LEFT ATRIAL APPENDAGE CLOSURE DEVICE: ICD-10-CM

## 2025-07-07 DIAGNOSIS — R73.01 IFG (IMPAIRED FASTING GLUCOSE): ICD-10-CM

## 2025-07-07 DIAGNOSIS — I77.9 BILATERAL CAROTID ARTERY DISEASE, UNSPECIFIED TYPE: ICD-10-CM

## 2025-07-07 DIAGNOSIS — E78.5 DYSLIPIDEMIA: ICD-10-CM

## 2025-07-07 DIAGNOSIS — Z95.0 CARDIAC PACEMAKER IN SITU: ICD-10-CM

## 2025-07-07 DIAGNOSIS — I10 HYPERTENSION, UNSPECIFIED TYPE: ICD-10-CM

## 2025-07-07 DIAGNOSIS — Z00.00 MEDICARE ANNUAL WELLNESS VISIT, SUBSEQUENT: Primary | ICD-10-CM

## 2025-07-07 DIAGNOSIS — N40.0 BENIGN PROSTATIC HYPERPLASIA, UNSPECIFIED WHETHER LOWER URINARY TRACT SYMPTOMS PRESENT: ICD-10-CM

## 2025-07-07 DIAGNOSIS — Z12.11 ENCOUNTER FOR SCREENING COLONOSCOPY: ICD-10-CM

## 2025-07-07 DIAGNOSIS — R53.83 OTHER FATIGUE: ICD-10-CM

## 2025-07-07 LAB
ALBUMIN SERPL-MCNC: 4.1 G/DL (ref 3.5–5)
ALBUMIN/GLOB SERPL: 1.2 (ref 1.1–2.2)
ALP SERPL-CCNC: 162 U/L (ref 45–117)
ALT SERPL-CCNC: 28 U/L (ref 12–78)
ANION GAP SERPL CALC-SCNC: 5 MMOL/L (ref 2–12)
AST SERPL-CCNC: 31 U/L (ref 15–37)
BASOPHILS # BLD: 0.05 K/UL (ref 0–0.1)
BASOPHILS NFR BLD: 0.8 % (ref 0–1)
BILIRUB SERPL-MCNC: 1.4 MG/DL (ref 0.2–1)
BUN SERPL-MCNC: 14 MG/DL (ref 6–20)
BUN/CREAT SERPL: 14 (ref 12–20)
CALCIUM SERPL-MCNC: 9.5 MG/DL (ref 8.5–10.1)
CHLORIDE SERPL-SCNC: 106 MMOL/L (ref 97–108)
CHOLEST SERPL-MCNC: 127 MG/DL
CO2 SERPL-SCNC: 29 MMOL/L (ref 21–32)
CREAT SERPL-MCNC: 0.99 MG/DL (ref 0.7–1.3)
DIFFERENTIAL METHOD BLD: NORMAL
EOSINOPHIL # BLD: 0.03 K/UL (ref 0–0.4)
EOSINOPHIL NFR BLD: 0.5 % (ref 0–7)
ERYTHROCYTE [DISTWIDTH] IN BLOOD BY AUTOMATED COUNT: 14.3 % (ref 11.5–14.5)
EST. AVERAGE GLUCOSE BLD GHB EST-MCNC: 114 MG/DL
GLOBULIN SER CALC-MCNC: 3.3 G/DL (ref 2–4)
GLUCOSE SERPL-MCNC: 104 MG/DL (ref 65–100)
HBA1C MFR BLD: 5.6 % (ref 4–5.6)
HCT VFR BLD AUTO: 49.9 % (ref 36.6–50.3)
HDLC SERPL-MCNC: 64 MG/DL
HDLC SERPL: 2 (ref 0–5)
HGB BLD-MCNC: 15.8 G/DL (ref 12.1–17)
IMM GRANULOCYTES # BLD AUTO: 0.03 K/UL (ref 0–0.04)
IMM GRANULOCYTES NFR BLD AUTO: 0.5 % (ref 0–0.5)
LDLC SERPL CALC-MCNC: 43.2 MG/DL (ref 0–100)
LYMPHOCYTES # BLD: 1.45 K/UL (ref 0.8–3.5)
LYMPHOCYTES NFR BLD: 24.3 % (ref 12–49)
MCH RBC QN AUTO: 28.5 PG (ref 26–34)
MCHC RBC AUTO-ENTMCNC: 31.7 G/DL (ref 30–36.5)
MCV RBC AUTO: 90.1 FL (ref 80–99)
MONOCYTES # BLD: 0.46 K/UL (ref 0–1)
MONOCYTES NFR BLD: 7.7 % (ref 5–13)
NEUTS SEG # BLD: 3.94 K/UL (ref 1.8–8)
NEUTS SEG NFR BLD: 66.2 % (ref 32–75)
NRBC # BLD: 0 K/UL (ref 0–0.01)
NRBC BLD-RTO: 0 PER 100 WBC
PLATELET # BLD AUTO: 181 K/UL (ref 150–400)
PMV BLD AUTO: 9.8 FL (ref 8.9–12.9)
POTASSIUM SERPL-SCNC: 4.7 MMOL/L (ref 3.5–5.1)
PROT SERPL-MCNC: 7.4 G/DL (ref 6.4–8.2)
RBC # BLD AUTO: 5.54 M/UL (ref 4.1–5.7)
SODIUM SERPL-SCNC: 140 MMOL/L (ref 136–145)
TRIGL SERPL-MCNC: 99 MG/DL
TSH SERPL DL<=0.05 MIU/L-ACNC: 1.78 UIU/ML (ref 0.36–3.74)
VLDLC SERPL CALC-MCNC: 19.8 MG/DL
WBC # BLD AUTO: 6 K/UL (ref 4.1–11.1)

## 2025-07-07 PROCEDURE — G0439 PPPS, SUBSEQ VISIT: HCPCS | Performed by: INTERNAL MEDICINE

## 2025-07-07 PROCEDURE — 1123F ACP DISCUSS/DSCN MKR DOCD: CPT | Performed by: INTERNAL MEDICINE

## 2025-07-07 PROCEDURE — 1126F AMNT PAIN NOTED NONE PRSNT: CPT | Performed by: INTERNAL MEDICINE

## 2025-07-07 PROCEDURE — 3078F DIAST BP <80 MM HG: CPT | Performed by: INTERNAL MEDICINE

## 2025-07-07 PROCEDURE — 3075F SYST BP GE 130 - 139MM HG: CPT | Performed by: INTERNAL MEDICINE

## 2025-07-07 ASSESSMENT — ENCOUNTER SYMPTOMS
FACIAL SWELLING: 0
CONSTIPATION: 0
DIARRHEA: 0
SORE THROAT: 0
WHEEZING: 0
EYE DISCHARGE: 0
ABDOMINAL PAIN: 0
SHORTNESS OF BREATH: 0
EYE ITCHING: 0
BACK PAIN: 0
COLOR CHANGE: 0
EYE PAIN: 0

## 2025-07-23 LAB — NONINV COLON CA DNA+OCC BLD SCRN STL QL: POSITIVE

## 2025-07-30 ENCOUNTER — TELEPHONE (OUTPATIENT)
Age: 80
End: 2025-07-30

## 2025-07-30 NOTE — TELEPHONE ENCOUNTER
Fax received from [facility] for clearance.    Procedure: colonoscopy on 9/12/25  Dr: Lawson Correia  Medication requested to hold: ASA    Fax to: [phone]  Ph: [phone]  Office contact     Last appt: 10/24/2024    Future Appointments   Date Time Provider Department Center   8/20/2025  1:00 PM PACEMAKER, [facility] AMB   8/20/2025  1:20 PM Sanjuana Moses MD [facility] AMB   10/24/2025 11:20 AM Silvia Cotton MD [facility] BS AMB   1/8/2026  1:00 PM Jarvis Clements MD [facility] DEP

## 2025-08-15 ENCOUNTER — ANESTHESIA (OUTPATIENT)
Facility: HOSPITAL | Age: 80
End: 2025-08-15
Payer: MEDICARE

## 2025-08-15 ENCOUNTER — ANESTHESIA EVENT (OUTPATIENT)
Facility: HOSPITAL | Age: 80
End: 2025-08-15
Payer: MEDICARE

## 2025-08-15 ENCOUNTER — HOSPITAL ENCOUNTER (OUTPATIENT)
Facility: HOSPITAL | Age: 80
Setting detail: OUTPATIENT SURGERY
Discharge: HOME | End: 2025-08-15
Attending: INTERNAL MEDICINE | Admitting: INTERNAL MEDICINE
Payer: MEDICARE

## 2025-08-15 VITALS
RESPIRATION RATE: 15 BRPM | SYSTOLIC BLOOD PRESSURE: 134 MMHG | WEIGHT: 216.49 LBS | TEMPERATURE: 97.5 F | BODY MASS INDEX: 27.78 KG/M2 | HEIGHT: 74 IN | OXYGEN SATURATION: 99 % | DIASTOLIC BLOOD PRESSURE: 67 MMHG | HEART RATE: 62 BPM

## 2025-08-15 PROCEDURE — 7100000011 HC PHASE II RECOVERY - ADDTL 15 MIN: Performed by: INTERNAL MEDICINE

## 2025-08-15 PROCEDURE — 7100000010 HC PHASE II RECOVERY - FIRST 15 MIN: Performed by: INTERNAL MEDICINE

## 2025-08-15 PROCEDURE — 3700000001 HC ADD 15 MINUTES (ANESTHESIA): Performed by: INTERNAL MEDICINE

## 2025-08-15 PROCEDURE — 3700000000 HC ANESTHESIA ATTENDED CARE: Performed by: INTERNAL MEDICINE

## 2025-08-15 PROCEDURE — 6360000002 HC RX W HCPCS: Performed by: NURSE ANESTHETIST, CERTIFIED REGISTERED

## 2025-08-15 PROCEDURE — 3600007502: Performed by: INTERNAL MEDICINE

## 2025-08-15 PROCEDURE — 3600007512: Performed by: INTERNAL MEDICINE

## 2025-08-15 PROCEDURE — 88305 TISSUE EXAM BY PATHOLOGIST: CPT

## 2025-08-15 RX ORDER — SODIUM CHLORIDE 9 MG/ML
INJECTION, SOLUTION INTRAVENOUS CONTINUOUS
Status: DISCONTINUED | OUTPATIENT
Start: 2025-08-15 | End: 2025-08-15 | Stop reason: HOSPADM

## 2025-08-15 RX ORDER — PROPOFOL 10 MG/ML
INJECTION, EMULSION INTRAVENOUS
Status: DISCONTINUED | OUTPATIENT
Start: 2025-08-15 | End: 2025-08-15 | Stop reason: SDUPTHER

## 2025-08-15 RX ORDER — LIDOCAINE HYDROCHLORIDE 20 MG/ML
INJECTION, SOLUTION EPIDURAL; INFILTRATION; INTRACAUDAL; PERINEURAL
Status: DISCONTINUED | OUTPATIENT
Start: 2025-08-15 | End: 2025-08-15 | Stop reason: SDUPTHER

## 2025-08-15 RX ADMIN — PROPOFOL 120 MCG/KG/MIN: 10 INJECTION, EMULSION INTRAVENOUS at 12:06

## 2025-08-15 RX ADMIN — PROPOFOL 50 MG: 10 INJECTION, EMULSION INTRAVENOUS at 12:05

## 2025-08-15 RX ADMIN — LIDOCAINE HYDROCHLORIDE 40 MG: 20 INJECTION, SOLUTION EPIDURAL; INFILTRATION; INTRACAUDAL; PERINEURAL at 12:05

## 2025-08-15 ASSESSMENT — PAIN SCALES - GENERAL
PAINLEVEL_OUTOF10: 0

## 2025-08-15 ASSESSMENT — PAIN - FUNCTIONAL ASSESSMENT: PAIN_FUNCTIONAL_ASSESSMENT: 0-10

## 2025-08-19 ENCOUNTER — CLINICAL DOCUMENTATION (OUTPATIENT)
Age: 80
End: 2025-08-19

## 2025-08-20 ENCOUNTER — OFFICE VISIT (OUTPATIENT)
Age: 80
End: 2025-08-20
Payer: MEDICARE

## 2025-08-20 ENCOUNTER — PROCEDURE VISIT (OUTPATIENT)
Age: 80
End: 2025-08-20
Payer: MEDICARE

## 2025-08-20 VITALS
BODY MASS INDEX: 28.11 KG/M2 | HEART RATE: 64 BPM | HEIGHT: 74 IN | RESPIRATION RATE: 18 BRPM | OXYGEN SATURATION: 98 % | SYSTOLIC BLOOD PRESSURE: 134 MMHG | DIASTOLIC BLOOD PRESSURE: 76 MMHG | WEIGHT: 219 LBS

## 2025-08-20 DIAGNOSIS — Z95.818 PRESENCE OF WATCHMAN LEFT ATRIAL APPENDAGE CLOSURE DEVICE: ICD-10-CM

## 2025-08-20 DIAGNOSIS — Z95.0 CARDIAC PACEMAKER IN SITU: ICD-10-CM

## 2025-08-20 DIAGNOSIS — I48.21 PERMANENT ATRIAL FIBRILLATION (HCC): Primary | ICD-10-CM

## 2025-08-20 DIAGNOSIS — E78.5 DYSLIPIDEMIA: ICD-10-CM

## 2025-08-20 DIAGNOSIS — I10 PRIMARY HYPERTENSION: ICD-10-CM

## 2025-08-20 DIAGNOSIS — Z95.0 CARDIAC PACEMAKER IN SITU: Primary | ICD-10-CM

## 2025-08-20 PROCEDURE — 3075F SYST BP GE 130 - 139MM HG: CPT | Performed by: INTERNAL MEDICINE

## 2025-08-20 PROCEDURE — 1126F AMNT PAIN NOTED NONE PRSNT: CPT | Performed by: INTERNAL MEDICINE

## 2025-08-20 PROCEDURE — G8419 CALC BMI OUT NRM PARAM NOF/U: HCPCS | Performed by: INTERNAL MEDICINE

## 2025-08-20 PROCEDURE — G8427 DOCREV CUR MEDS BY ELIG CLIN: HCPCS | Performed by: INTERNAL MEDICINE

## 2025-08-20 PROCEDURE — 1036F TOBACCO NON-USER: CPT | Performed by: INTERNAL MEDICINE

## 2025-08-20 PROCEDURE — 1123F ACP DISCUSS/DSCN MKR DOCD: CPT | Performed by: INTERNAL MEDICINE

## 2025-08-20 PROCEDURE — 99214 OFFICE O/P EST MOD 30 MIN: CPT | Performed by: INTERNAL MEDICINE

## 2025-08-20 PROCEDURE — G2211 COMPLEX E/M VISIT ADD ON: HCPCS | Performed by: INTERNAL MEDICINE

## 2025-08-20 PROCEDURE — 93279 PRGRMG DEV EVAL PM/LDLS PM: CPT | Performed by: INTERNAL MEDICINE

## 2025-08-20 PROCEDURE — 3078F DIAST BP <80 MM HG: CPT | Performed by: INTERNAL MEDICINE

## 2025-08-20 PROCEDURE — 1159F MED LIST DOCD IN RCRD: CPT | Performed by: INTERNAL MEDICINE

## 2025-08-24 RX ORDER — FLUTICASONE PROPIONATE 50 MCG
2 SPRAY, SUSPENSION (ML) NASAL DAILY
Qty: 48 G | Refills: 3 | Status: SHIPPED | OUTPATIENT
Start: 2025-08-24

## (undated) DEVICE — SOLIDIFIER MEDC 1200ML -- CONVERT TO 356117

## (undated) DEVICE — Device: Brand: PROTRACK PIGTAIL WIRE

## (undated) DEVICE — SET GRAV CK VLV NEEDLESS ST 3 GANGED 4WAY STPCOCK HI FLO 10

## (undated) DEVICE — BAG SPEC BIOHZRD 10 X 10 IN --

## (undated) DEVICE — GUIDEWIRE VASC J 3 MM 0.035 INX210 CM FIX COR INQWIRE

## (undated) DEVICE — INTENDED FOR TISSUE SEPARATION, AND OTHER PROCEDURES THAT REQUIRE A SHARP SURGICAL BLADE TO PUNCTURE OR CUT.: Brand: BARD-PARKER ® CARBON RIB-BACK BLADES

## (undated) DEVICE — COVER LT HNDL PLAS RIG 1 PER PK

## (undated) DEVICE — BANDAGE COBAN 4 IN COMPR W4INXL5YD FOAM COHESIVE QUIK STK SELF ADH SFT

## (undated) DEVICE — HAND I-LF: Brand: MEDLINE INDUSTRIES, INC.

## (undated) DEVICE — BAG BELONG PT PERS CLEAR HANDL

## (undated) DEVICE — Device

## (undated) DEVICE — SOL IRRIGATION INJ NACL 0.9% 500ML BTL

## (undated) DEVICE — PINNACLE INTRODUCER SHEATH: Brand: PINNACLE

## (undated) DEVICE — ZIMMER® STERILE DISPOSABLE TOURNIQUET CUFF WITH PROTECTIVE SLEEVE AND PLC, DUAL PORT, SINGLE BLADDER, 18 IN. (46 CM)

## (undated) DEVICE — STERILE POLYISOPRENE POWDER-FREE SURGICAL GLOVES: Brand: PROTEXIS

## (undated) DEVICE — AMPLATZ EXTRA STIFF WIRE GUIDE: Brand: AMPLATZ

## (undated) DEVICE — BAG TRASH WST 122 CM 183 CM 1400 CC FEMALE LUER PVC DEPOT

## (undated) DEVICE — 1200 GUARD II KIT W/5MM TUBE W/O VAC TUBE: Brand: GUARDIAN

## (undated) DEVICE — MEDI-TRACE CADENCE ADULT, DEFIBRILLATION ELECTRODE -RTS  (10 PR/PK) - PHYSIO-CONTROL: Brand: MEDI-TRACE CADENCE

## (undated) DEVICE — LOOP VES W13MM THK09MM MINI RED SIL FLD REPELLENT

## (undated) DEVICE — BIT DRL L5IN DIA48MM STD ST S STL TWST BUSA

## (undated) DEVICE — CATHETER REPROC ULTRASOUND 8 FRX90 CM ACUSON ACUNAV

## (undated) DEVICE — ELECTRODE PT RET AD L9FT HI MOIST COND ADH HYDRGEL CORDED

## (undated) DEVICE — VASCULAR CLOSURE SUTURE PERCLOSE

## (undated) DEVICE — PRESSURE MONITORING SET: Brand: TRUWAVE

## (undated) DEVICE — PROVE COVER: Brand: UNBRANDED

## (undated) DEVICE — ELECTRODE,RADIOTRANSLUCENT,FOAM,5PK: Brand: MEDLINE

## (undated) DEVICE — ACCESS SHEATH WITH DILATOR: Brand: WATCHMAN FXD CURVE™ ACCESS SYSTEM

## (undated) DEVICE — INFECTION CONTROL KIT SYS

## (undated) DEVICE — MTS LEFT HEART KIT ST MARY'S RICHMOND: Brand: NAMIC

## (undated) DEVICE — SUTURE FIBERWIRE 3-0 L18IN NONABSORBABLE BLU L15MM 3/8 CIR AR722701

## (undated) DEVICE — CANN NASAL O2 CAPNOGRAPHY AD -- FILTERLINE

## (undated) DEVICE — SUTURE PDS II SZ 0 L36IN ABSRB VLT L40MM CT 1/2 CIR Z358T

## (undated) DEVICE — SYRINGE MED 50ML LUERLOCK TIP

## (undated) DEVICE — HEART CATH-MRMC: Brand: MEDLINE INDUSTRIES, INC.

## (undated) DEVICE — NDL FLTR TIP 5 MIC 18GX1.5IN --

## (undated) DEVICE — BLADE SAW W5.5XL25MM THK0.38MM CUT THK0.43MM REPL S STL SAG

## (undated) DEVICE — CATHETER ETER DIAG L110CM OD6FR VASC PGTL W SIDE H COR W OUT

## (undated) DEVICE — SUT SLK 2-0SH 30IN BLK --

## (undated) DEVICE — CATH IV AUTOGRD BC PNK 20GA 25 -- INSYTE

## (undated) DEVICE — NEEDLE ANGIO 18GAX7CM SECURELOC

## (undated) DEVICE — BIPOLAR FORCEPS CORD: Brand: VALLEYLAB

## (undated) DEVICE — ELECTRODE,RADIOTRANSLUCENT,FOAM,3PK: Brand: MEDLINE

## (undated) DEVICE — FORCEPS BX L240CM JAW DIA2.8MM L CAP W/ NDL MIC MESH TOOTH

## (undated) DEVICE — SUTURE FIBERWIRE 4-0 L18IN NONABSORBABLE BLU L12.3MM 3/8 AR723001

## (undated) DEVICE — 3M™ TEGADERM™ TRANSPARENT FILM DRESSING FRAME STYLE, 1626W, 4 IN X 4-3/4 IN (10 CM X 12 CM), 50/CT 4CT/CASE: Brand: 3M™ TEGADERM™

## (undated) DEVICE — KIT COLON W/ 1.1OZ LUB AND 2 END

## (undated) DEVICE — STRAP,POSITIONING,KNEE/BODY,FOAM,4X60": Brand: MEDLINE

## (undated) DEVICE — SUTURE ETHLN SZ 4-0 L18IN NONABSORBABLE BLK L19MM PS-2 3/8 1667H

## (undated) DEVICE — BLADE OPHTH ORNG GRINDLESS SMALLER ALTERNATIVE TO NO15 GEN

## (undated) DEVICE — NDL PRT INJ NSAF BLNT 18GX1.5 --

## (undated) DEVICE — SKIN PREP TRAY W/CHG: Brand: MEDLINE INDUSTRIES, INC.

## (undated) DEVICE — ADULT SPO2 SENSOR: Brand: NELLCOR

## (undated) DEVICE — DRAPE,REIN 53X77,STERILE: Brand: MEDLINE

## (undated) DEVICE — CONTAINER SPEC 20 ML LID NEUT BUFF FORMALIN 10 % POLYPR STS

## (undated) DEVICE — BLADE SURG 90DEG BVL UP LAMLLR

## (undated) DEVICE — 1010 S-DRAPE TOWEL DRAPE 10/BX: Brand: STERI-DRAPE™

## (undated) DEVICE — SIMPLICITY FLUFF UNDERPAD 23X36, MODERATE: Brand: SIMPLICITY

## (undated) DEVICE — DRESSING GZ FLUF 36X36 IN RND 2 PLY PD GZ AMER WHT CROSS

## (undated) DEVICE — 1 X VERSACROSS CONNECT TRANSSEPTAL DILATOR (INCLUDING 1 X J-TIP MECHANICAL GUIDEWIRE); 1 X VERSACROSS RF WIRE (INCLUDING 1 X CONNECTOR CABLE (SINGLE USE)); 1 X DISPERSIVE ELECTRODE: Brand: VERSACROSS CONNECT LAAC ACCESS SOLUTION

## (undated) DEVICE — BLADE, TONGUE, 6", STERILE: Brand: MEDLINE

## (undated) DEVICE — COVER CATH ACUNAV ULTRASOUND 5X72IN ANTI STATIC